# Patient Record
Sex: FEMALE | Race: WHITE | NOT HISPANIC OR LATINO | Employment: PART TIME | URBAN - METROPOLITAN AREA
[De-identification: names, ages, dates, MRNs, and addresses within clinical notes are randomized per-mention and may not be internally consistent; named-entity substitution may affect disease eponyms.]

---

## 2019-06-18 ENCOUNTER — CLINICAL SUPPORT (OUTPATIENT)
Dept: BARIATRICS | Facility: CLINIC | Age: 44
End: 2019-06-18
Payer: COMMERCIAL

## 2019-06-18 ENCOUNTER — OFFICE VISIT (OUTPATIENT)
Dept: BARIATRICS | Facility: CLINIC | Age: 44
End: 2019-06-18
Payer: COMMERCIAL

## 2019-06-18 VITALS
WEIGHT: 247.88 LBS | DIASTOLIC BLOOD PRESSURE: 87 MMHG | RESPIRATION RATE: 16 BRPM | SYSTOLIC BLOOD PRESSURE: 147 MMHG | HEART RATE: 87 BPM | BODY MASS INDEX: 43.92 KG/M2 | HEIGHT: 63 IN

## 2019-06-18 VITALS — BODY MASS INDEX: 43.86 KG/M2 | WEIGHT: 247.56 LBS | HEIGHT: 63 IN

## 2019-06-18 DIAGNOSIS — F41.9 ANXIETY: ICD-10-CM

## 2019-06-18 DIAGNOSIS — E11.9 TYPE 2 DIABETES MELLITUS WITHOUT COMPLICATION, WITHOUT LONG-TERM CURRENT USE OF INSULIN: ICD-10-CM

## 2019-06-18 DIAGNOSIS — I10 ESSENTIAL HYPERTENSION: ICD-10-CM

## 2019-06-18 DIAGNOSIS — F32.A DEPRESSION, UNSPECIFIED DEPRESSION TYPE: ICD-10-CM

## 2019-06-18 DIAGNOSIS — J44.89 COPD WITH CHRONIC BRONCHITIS: ICD-10-CM

## 2019-06-18 DIAGNOSIS — E66.01 MORBID OBESITY WITH BMI OF 40.0-44.9, ADULT: Primary | ICD-10-CM

## 2019-06-18 DIAGNOSIS — M47.812 ARTHRITIS OF NECK: ICD-10-CM

## 2019-06-18 DIAGNOSIS — J42 CHRONIC BRONCHITIS, UNSPECIFIED CHRONIC BRONCHITIS TYPE: ICD-10-CM

## 2019-06-18 PROCEDURE — 99999 PR PBB SHADOW E&M-EST. PATIENT-LVL III: CPT | Mod: PBBFAC,,, | Performed by: PHYSICIAN ASSISTANT

## 2019-06-18 PROCEDURE — 99999 PR PBB SHADOW E&M-EST. PATIENT-LVL III: ICD-10-PCS | Mod: PBBFAC,,, | Performed by: PHYSICIAN ASSISTANT

## 2019-06-18 PROCEDURE — 3079F PR MOST RECENT DIASTOLIC BLOOD PRESSURE 80-89 MM HG: ICD-10-PCS | Mod: CPTII,S$GLB,, | Performed by: PHYSICIAN ASSISTANT

## 2019-06-18 PROCEDURE — 99999 PR PBB SHADOW E&M-NEW PATIENT-LVL II: CPT | Mod: PBBFAC,,, | Performed by: DIETITIAN, REGISTERED

## 2019-06-18 PROCEDURE — 99999 PR PBB SHADOW E&M-NEW PATIENT-LVL II: ICD-10-PCS | Mod: PBBFAC,,, | Performed by: DIETITIAN, REGISTERED

## 2019-06-18 PROCEDURE — 3077F PR MOST RECENT SYSTOLIC BLOOD PRESSURE >= 140 MM HG: ICD-10-PCS | Mod: CPTII,S$GLB,, | Performed by: PHYSICIAN ASSISTANT

## 2019-06-18 PROCEDURE — 3008F PR BODY MASS INDEX (BMI) DOCUMENTED: ICD-10-PCS | Mod: CPTII,S$GLB,, | Performed by: PHYSICIAN ASSISTANT

## 2019-06-18 PROCEDURE — 99499 UNLISTED E&M SERVICE: CPT | Mod: S$GLB,,, | Performed by: DIETITIAN, REGISTERED

## 2019-06-18 PROCEDURE — 3079F DIAST BP 80-89 MM HG: CPT | Mod: CPTII,S$GLB,, | Performed by: PHYSICIAN ASSISTANT

## 2019-06-18 PROCEDURE — 3008F BODY MASS INDEX DOCD: CPT | Mod: CPTII,S$GLB,, | Performed by: PHYSICIAN ASSISTANT

## 2019-06-18 PROCEDURE — 99499 NO LOS: ICD-10-PCS | Mod: S$GLB,,, | Performed by: DIETITIAN, REGISTERED

## 2019-06-18 PROCEDURE — 99205 OFFICE O/P NEW HI 60 MIN: CPT | Mod: S$GLB,,, | Performed by: PHYSICIAN ASSISTANT

## 2019-06-18 PROCEDURE — 3077F SYST BP >= 140 MM HG: CPT | Mod: CPTII,S$GLB,, | Performed by: PHYSICIAN ASSISTANT

## 2019-06-18 PROCEDURE — 99205 PR OFFICE/OUTPT VISIT, NEW, LEVL V, 60-74 MIN: ICD-10-PCS | Mod: S$GLB,,, | Performed by: PHYSICIAN ASSISTANT

## 2019-06-18 RX ORDER — FENOPROFEN CALCIUM 600 MG/1
1 TABLET, FILM COATED ORAL 3 TIMES DAILY PRN
COMMUNITY
End: 2019-10-30

## 2019-06-18 RX ORDER — TIZANIDINE 4 MG/1
4 TABLET ORAL EVERY 6 HOURS PRN
COMMUNITY
End: 2019-10-30

## 2019-06-18 RX ORDER — METFORMIN HYDROCHLORIDE 1000 MG/1
1000 TABLET ORAL 2 TIMES DAILY WITH MEALS
COMMUNITY
End: 2022-04-29

## 2019-06-18 RX ORDER — VENLAFAXINE HYDROCHLORIDE 150 MG/1
150 CAPSULE, EXTENDED RELEASE ORAL DAILY
COMMUNITY
Start: 2019-04-03 | End: 2023-02-15

## 2019-06-18 RX ORDER — GABAPENTIN 300 MG/1
300 CAPSULE ORAL 3 TIMES DAILY
COMMUNITY
End: 2023-02-15 | Stop reason: CLARIF

## 2019-06-18 RX ORDER — BUDESONIDE AND FORMOTEROL FUMARATE DIHYDRATE 160; 4.5 UG/1; UG/1
2 AEROSOL RESPIRATORY (INHALATION) DAILY PRN
COMMUNITY
End: 2019-10-30

## 2019-06-18 RX ORDER — DAPAGLIFLOZIN 5 MG/1
5 TABLET, FILM COATED ORAL DAILY
COMMUNITY
End: 2019-08-22

## 2019-06-18 RX ORDER — LAMOTRIGINE 25 MG/1
25 TABLET ORAL 2 TIMES DAILY
COMMUNITY
Start: 2019-06-17 | End: 2023-02-15 | Stop reason: CLARIF

## 2019-06-18 RX ORDER — FLUCONAZOLE 150 MG/1
TABLET ORAL
Refills: 0 | COMMUNITY
Start: 2019-04-05 | End: 2019-08-22 | Stop reason: ALTCHOICE

## 2019-06-18 RX ORDER — LANCETS 30 GAUGE
EACH MISCELLANEOUS
Refills: 0 | COMMUNITY
Start: 2019-06-06 | End: 2019-06-18 | Stop reason: ALTCHOICE

## 2019-06-18 RX ORDER — SULFAMETHOXAZOLE AND TRIMETHOPRIM 800; 160 MG/1; MG/1
TABLET ORAL
Refills: 0 | COMMUNITY
Start: 2019-04-03 | End: 2019-06-18 | Stop reason: ALTCHOICE

## 2019-06-18 RX ORDER — LISINOPRIL AND HYDROCHLOROTHIAZIDE 12.5; 2 MG/1; MG/1
1 TABLET ORAL DAILY
COMMUNITY
End: 2019-10-30

## 2019-06-18 RX ORDER — PIOGLITAZONEHYDROCHLORIDE 15 MG/1
15 TABLET ORAL DAILY
COMMUNITY
End: 2019-08-22

## 2019-06-18 RX ORDER — BUTALBITAL, ASPIRIN, AND CAFFEINE 325; 50; 40 MG/1; MG/1; MG/1
2 CAPSULE ORAL EVERY 4 HOURS PRN
COMMUNITY
End: 2019-10-30

## 2019-06-18 NOTE — PROGRESS NOTES
BARIATRIC NEW PATIENT EVALUATION    CHIEF COMPLAINT:   Morbid Obesity Body mass index is 43.91 kg/m². and inability to lose weight.    HISTORY OF PRESENT ILLNESS:  Leora Steward  is a 43 y.o. female presenting for morbid obesity Body mass index is 43.91 kg/m². and inability to lose weight. PT states that she started to gain weight at the age of 14 and then it escalated. The patient has tried weight watchers, [facility], nutri system, keto, low carb diet, hydroxycut, adipex, katharine, zantrex, lipozene, pure garcinia . 270 highest weight, lowest adult weight     UTI once a year per patient.     NO inpatient or outpatient psychiatric treatment. Pt states that she did have suicidal ideations last year with no plan. Does not currently feel suicidal or homicidal, has no intentions to harm herself or others. States that medications have been well controlled.     Pt states that she has intermittent heartburn but is not currently on any medication for heartburn. Based on foods.    PAST MEDICAL HISTORY:  Past Medical History:   Diagnosis Date    Anxiety     Arthritis     COPD (chronic obstructive pulmonary disease)     Depression     Diabetes mellitus     Hypertension     Neuromuscular disorder          PAST SURGICAL HISTORY:  Past Surgical History:   Procedure Laterality Date    CHOLECYSTECTOMY      HYSTERECTOMY      full    TUBAL LIGATION         FAMILY HISTORY:  Family History   Problem Relation Age of Onset    No Known Problems Mother     Diabetes Father     Hypertension Father     Diabetes Sister     Diabetes Brother     No Known Problems Daughter     No Known Problems Son     Thyroid disease Sister     No Known Problems Sister     No Known Problems Sister        SOCIAL HISTORY:  Social History     Socioeconomic History    Marital status:      Spouse name: Not on file    Number of children: Not on file    Years of education: Not on file    Highest education level: Not on file   Occupational  History    Not on file   Social Needs    Financial resource strain: Not on file    Food insecurity:     Worry: Not on file     Inability: Not on file    Transportation needs:     Medical: Not on file     Non-medical: Not on file   Tobacco Use    Smoking status: Former Smoker     Last attempt to quit: 2013     Years since quittin.4    Smokeless tobacco: Never Used   Substance and Sexual Activity    Alcohol use: Not Currently     Frequency: Never    Drug use: Never    Sexual activity: Yes     Partners: Male     Birth control/protection: See Surgical Hx   Lifestyle    Physical activity:     Days per week: Not on file     Minutes per session: Not on file    Stress: Not on file   Relationships    Social connections:     Talks on phone: Not on file     Gets together: Not on file     Attends Holiness service: Not on file     Active member of club or organization: Not on file     Attends meetings of clubs or organizations: Not on file     Relationship status: Not on file   Other Topics Concern    Not on file   Social History Narrative    Not on file       MEDICATIONS:  Current Outpatient Medications   Medication Sig Dispense Refill    butalbital-aspirin-caffeine -40 mg (FIORINAL) -40 mg Cap Take 2 capsules by mouth every 4 (four) hours as needed.      dapagliflozin (FARXIGA) 5 mg Tab tablet Take 5 mg by mouth once daily.      fenoprofen (NAPROFEN) 600 mg Tab Take 1 tablet by mouth 3 (three) times daily as needed.      fluconazole (DIFLUCAN) 150 MG Tab   0    gabapentin (NEURONTIN) 300 MG capsule Take 300 mg by mouth 3 (three) times daily.      lamoTRIgine (LAMICTAL) 25 MG tablet Take 25 mg by mouth 2 (two) times daily.       lisinopril-hydrochlorothiazide (PRINZIDE,ZESTORETIC) 20-12.5 mg per tablet Take 1 tablet by mouth once daily.      metFORMIN (GLUCOPHAGE) 1000 MG tablet Take 1,000 mg by mouth 2 (two) times daily with meals.      pioglitazone (ACTOS) 15 MG tablet Take 15 mg by  "mouth once daily.      tiZANidine (ZANAFLEX) 4 MG tablet Take 4 mg by mouth every 6 (six) hours as needed.      venlafaxine (EFFEXOR-XR) 150 MG Cp24 Take 150 mg by mouth once daily.        No current facility-administered medications for this visit.        ALLERGIES:  Review of patient's allergies indicates:  No Known Allergies    ROS:  Review of Systems   Constitutional: Negative for chills and fever.   HENT: Negative for tinnitus.    Eyes: Positive for blurred vision (needs prescription ). Negative for double vision.   Respiratory: Negative for cough and shortness of breath.    Cardiovascular: Positive for leg swelling (BLE feet). Negative for chest pain and palpitations.   Gastrointestinal: Positive for diarrhea (side effect of metformin ) and heartburn. Negative for abdominal pain, constipation, nausea and vomiting.   Genitourinary: Negative for dysuria and hematuria.   Musculoskeletal: Positive for neck pain. Negative for back pain, falls and joint pain.   Skin: Negative for rash.   Neurological: Positive for tingling. Negative for dizziness and headaches.   Psychiatric/Behavioral: Positive for depression. Negative for suicidal ideas. The patient is nervous/anxious. The patient does not have insomnia.        PE:  Vitals:    06/18/19 1143   BP: (!) 147/87   Pulse: 87   Resp: 16   Weight: 112.4 kg (247 lb 14.4 oz)   Height: 5' 3" (1.6 m)       Physical Exam   Constitutional: She is oriented to person, place, and time. She appears well-developed and well-nourished.   HENT:   Head: Normocephalic and atraumatic.   Eyes: Pupils are equal, round, and reactive to light. Conjunctivae and EOM are normal.   Neck: Normal range of motion. Neck supple. No JVD present. No thyromegaly present.   Cardiovascular: Normal rate, regular rhythm, normal heart sounds and intact distal pulses.   No murmur heard.  Pulmonary/Chest: Effort normal and breath sounds normal. She has no wheezes.   Abdominal: Soft. Bowel sounds are normal. " She exhibits no mass. There is no tenderness. No hernia.   Musculoskeletal: Normal range of motion. She exhibits no edema or tenderness.   Neurological: She is alert and oriented to person, place, and time. Coordination normal.   Skin: Skin is warm and dry. Capillary refill takes less than 2 seconds. No rash noted. She is not diaphoretic. No erythema.   Psychiatric: She has a normal mood and affect. Her behavior is normal. Judgment and thought content normal.        DIAGNOSIS:  1. Morbid Obesity with Body mass index is 43.91 kg/m². and inability to lose weight.  2. Co-morbidities: Anxiety/depression, COPD, HTN, DM type 2    PLAN:  The patient is Good candidate for Bariatric Surgery. she is interested in laparoscopic [facility] with Dr. Ulrich. The surgery and post-op care were discussed in detail with the patient. All questions were answered.    she understands that bariatric surgery is a tool to aid in weight loss and that she needs to be committed to the diet and exercise post-operatively for successful weight loss.  Discussed expected weight loss outcomes after surgery which is 50% of the excess weight on her frame.  Goal weight is 135#s.  Discussed with patient that bariatric surgery is not the easy way out and that it will take plenty of dedication on the patient's part to be successful. Also discussed the possibility of weight regain if the patient strays from the diet guidelines or exercise requirements. Patient verbalized understanding and wishes to proceed with the work-up.    ORDERS:  1. Chest X-Ray and EKG  2. Psychological Consult, Bariatric Dietician Consult and PCP Clearance  3. Bariatric Labs: BMP, CBC, Folate Serum, H. pylori, HgA1C, Hepatic Panel/LFT, Iron & TIBC, Lipid Profile, Magnesium, Phosphate, T3, T4, TSH, Free T4, Vitamin B12, and Vitamin B1.  4. Mental health contract     Primary Physician: Camacho Augustine MD  RTC: As scheduled.    60 minute visit, over 50% of time spent counseling  patient face to face on surgical options, risks, benefits, expected diet, recommended exercise regimen, and expected weight loss.

## 2019-06-18 NOTE — PROGRESS NOTES
"NUTRITIONAL CONSULT    Referring Physician: Edmundo Ulrich M.D.  Reason for MNT Referral: Initial assessment for sleeve gastrectomy work-up    PAST MEDICAL HISTORY:   43 y.o. female  Body mass index is 43.86 kg/m².  Weight history includes - since teens was overweight and started gaining about 16-17 years old. Pt reports lost 12lbs with weight watchers then plateau.  Dieting attempts include weight watchers, [facility], [facility], keto, low carb diet, hydroxycut, adipex, katharine, zantrex, lipozene, pure garcinia    PMH: DM2, HTN, depression, COPD, back/neck/knees pain    CLINICAL DATA:  43 y.o.-year-old White female.  Height: 5'3"  Weight: 247 lbs  IBW: 135 lbs  BMI: 43.83  The patient's goal weight (50% EBW): 191 lbs  Personal goal weight: 140 lbs    Goal for Bariatric Surgery: to improve health, to improve quality of life, to lose weight and decrease meds    NUTRITIONAL NEEDS:  1734 x 1.2 activity factor = 2080 - 750 for wt loss = 1330 Calories (using [place] [address] Jeor Equation)   Grams Protein (1.5-1.8 gm/kg IBW)    NUTRITION & HEALTH HISTORY:  Greatest challenge: portion control, emotional eating and always hungry    Current diet recall: 1200 calorie diet on [employer] - finished 1 month  Breakfast: 2 eggs with low calorie muffin  Snack: probiotic protein shake  Lunch: 1/2 cup chicken noodle soup, vegetables  Snack: probiotic shake  Dinner: 3oz hamburger, salad/vegetables    Current Diet:  Protein supplements: 2/day  Vegetables: Likes a variety. Eats daily.  Fruits: Likes a variety. Eats almost daily.  Beverages: water, sugar-free beverages, diet tea and coffee without sugar  Dining out: Reduced lately. Mostly fast food and restaurants.  Cooking at home: Weekly. Mostly baked, grilled and air fryer meat, fish and vegetables.    Exercise:  Current exercise: Fair x 3-5 per week [employer]  Restrictions to exercise: knee, back, neck pain    Vitamins / Minerals / Herbs:   None, upset stomach    "   Labs:   No recent    Food Allergies:   none    Social:  Works regular daytime shifts.  Lives with spouse and 2 adult kids.  Grocery shopping and food prep - patient completes.  Patient believes the household will be supportive after surgery.  Alcohol: None.  Smoking: None.    ASSESSMENT:  · Patient reports attempts at weight loss, only to regain lost weight.  · Patient demonstrated knowledge of healthy eating behaviors and exercise patterns; admits to not eating healthy and not exercising at this point.  · Patient states willingness to change lifestyle and make behavior modifications as evidenced by good exercise, daily food logs, including protein drinks, increased vegetables and healthier cooking at home.    Insurance does not require medically supervised diet prior to consideration for bariatric surgery.    Barriers to Education: none    Stage of change: action and determination    NUTRITION DIAGNOSIS:    Obesity related to Excessive calorie intake and Physical inactivity in the past as evidence by BMI.    BARIATRIC DIET DISCUSSION/PLAN:  Discussed diet after surgery and related to patient's food record.  Reviewed nutrition guidelines for before and after surgery.  Answered all questions.  Continue to review Bariatric Nutrition Guidebook at home and call with any questions.  Work on expanding variety of vegetables.  Work on gradually cutting back on starchy CHO in the diet.  Begin trying various protein supplements to determine preference.  5-6 meals per day.  Return to clinic.  keep food log    RECOMMENDATIONS:  Patient is a good candidate for bariatric surgery.    Patient and Spouse verbalized understanding.    Expect good  compliance after surgery at this time.    Communicated nutrition plan with bariatric team.    SESSION TIME:  60 minutes

## 2019-06-18 NOTE — PATIENT INSTRUCTIONS
Prior to surgery you will need to complete:  - Dietitian consult and follow up appointments as needed  - PCP clearance  - Labs  - Chest X-ray  - EKG  - Psychological evaluation, Please call psychiatry [phone] to schedule   - PFT    In preparation for bariatric surgery, please complete the following:   · Discuss your current medications with your primary care provider, remember your medications will need to be crushed, chewable, or in liquid form for the first 3-6 months after a gastric bypass or sleeve.  For a gastric band, your medications will need to be crushed indefinitely.    · If you take a blood thinner such as: Coumadin (warfarin), Pradaxa (dabigatran), or Plavix (clopidogrel), you will need to speak with your prescribing provider on how or if this medication can be stopped before surgery.   · If you take a medication for depression or anxiety, you will need to begin crushing or opening the capsule 1-3 months prior to surgery.  Remember to discuss this with the psychologist or psychiatrist that you see.   · If you take medication for arthritis on a daily basis that is considered a non-steroidal anti-inflammatory (NSAID), please discuss with your prescribing physician an alternative medication.  After having gastric bypass or gastric sleeve, this group of medications is not appropriate to take due to increased risk of bleeding stomach ulcers.      DEFINITIONS  Appointments: Pre-scheduled meetings or consultations with any physician, advanced practice provider, dietitian, or psychologist, and labs, imaging studies, sleep studies, etc.   Late cancellation: Cancelling an appointment 24-48 hours prior to scheduled time.  No-Show appointment:  is when    You do NOT arrive to your appointment at the time its scheduled.   You call to cancel or cancel via [facility] less than 24 hours in advance of your scheduled appointment   You show up 15 minutes AFTER your scheduled appointment time without any  notification of being late.

## 2019-06-18 NOTE — PATIENT INSTRUCTIONS
Continue to review Bariatric Nutrition Guidebook at home and call with any questions.  Work on expanding variety of vegetables.  Work on gradually cutting back on starchy CHO in the diet.  Begin trying various protein supplements to determine preference.  5-6 meals per day.  Return to clinic.  keep food log.    Meal Ideas for Regular Bariatric Diet  *Recipes and products available at [email or web address]      Breakfast: (15-20g protein)    - Egg white omelet: 2 egg whites or ½ cup Egg Beaters. (Optional proteins: cheese, shrimp, black beans, chicken, sliced turkey) (Optional veggies: tomatoes, salsa, spinach, mushrooms, onions, green peppers, or small slice avocado)     - Egg and sausage: 1 egg or ¼ cup Egg Beaters (any variety), with 1 brock or 2 links of Turkey sausage or Veggie breakfast sausage (Fanzila or [employer])    - Crust-less breakfast quiche: To make a glass pie dish, mix 4oz part skim Ricotta, 1 cup skim milk, and 2 eggs as your base. Add protein: shredded cheese, sliced lean ham or turkey, turkey wing/sausage. Add veggies: tomato, onion, green onion, mushroom, green pepper, spinach, etc.    - Yogurt parfait: Mix 1 - 6oz container Dannon Light N Fit vanilla yogurt, with ¼ cup crushed unsalted nuts    - Cottage cheese and fruit: ½ cup part-skim cottage cheese or ricotta cheese topped with fresh fruit or sugar free preserves     - Adia Moncaad's Vanilla Egg custard* (add 2 Tbsp instant coffee granules to make Cappuccino Custard*)    - Hi-Protein café latte (skim milk, decaf coffee, 1 scoop protein powder). Optional to add Sugar free syrup or extract flavoring.    - Breakfast Lox: spread fat free cream cheese on slices of smoked salmon. Serve over scrambled or egg over easy (sauteed with nonstick cookspray) OR on a cucumber slice    - Eggwhich: Scramble or cook 1 large egg over easy using nonstick cookspray. Place between 2 slices of Kazakh wing and low fat cheese.     Lunch: (20-30g  protein)    - ½ cup Black bean soup (Homemade or Progresso), with ¼ cup shredded low-fat cheese. Top with chopped tomato or fresh salsa.     - Lean deli turkey breast and low-fat sliced cheese, mustard or light bridges to moisten, rolled up together, or wrapped in a César lettuce leaf    - Chicken salad made from dinner leftovers, moisten with low-fat salad dressing or light bridges. Also try leftover salmon, shrimp, tuna or boiled eggs. Serve ½ cup over dark green salad    - Fat-free canned refried beans, topped with ¼ cup shredded low-fat cheese. Top with chopped tomato or fresh salsa.     - Greek salad: Top mixed greens with 1-2oz grilled chicken, tomatoes, red onions, 2-3 kalamata olives, and sprinkle lightly with feta cheese. Spritz with Balsamic vinegar to taste.     - Crust-less lunch quiche: To make a glass pie dish, mix 4oz part skim Ricotta, 1 cup skim milk, and 2 eggs as your base. Add protein: shredded cheese, sliced lean ham or turkey, shrimp, chicken. Add veggies: tomato, onion, green onion, mushroom, green pepper, spinach, artichoke, broccoli, etc.    - Pizza bake: spread a  tobi brandie mushroom with tomato sauce, low-fat shredded mozzarella and turkey pepperoni or [place] wing. Add any veggies. Roast for 10-15 minutes, until cheese melted.     - Cucumber crab bites: Spread ¼ cup crab dip (lump crabmeat + light cream cheese and green onions) over sliced cucumber.     - Chicken with light spinach and artichoke dip*: Puree in : 6oz cooked and drained spinach, 2 cloves garlic, 1 can cannelloni beans, ½ cup chopped green onions, 1 can drained artichoke hearts (not marinated in oil), lemon juice and basil. Mix in 2oz chopped up chicken.    Supper: (20-30g protein)    - Serve grilled fish over dark green salad tossed with low-fat dressing, served with grilled asparagus mccrary     - Rotisserie chicken salad: served with sliced strawberries, walnuts, fat-free feta cheese crumbles and 1 tbsp  Saniya Own Light Raspberry [place] Vinaigrette    - Shrimp cocktail: Dip cold boiled shrimp in homemade low-sugar cocktail sauce (1/2 cup Bertrand One Carb ketchup, 2 tbsp horseradish, 1/4 tsp hot sauce, 1 tsp Worcestershire sauce, 1 tbsp freshly-squeezed lemon juice). Serve with dark green salad, walnuts, and crumbled blue cheese drizzled with olive oil and Balsamic vinegar    - Tuna Melt: Spread tuna salad onto 2 thick slices of tomato. Top with low-fat cheese and broil until cheese is melted. May also be made with chicken salad of shrimp salad. [place] with different types of cheeses.    - Chicken or beef fajitas (no tortilla, rice, beans, chips). Top meat and veggies w/ fresh salsa, fat free sour cream.     - Homemade low-fat Chili using extra lean ground beef or ground turkey. Top with shredded cheese and salsa as desired. May add dollop fat-free sour cream if desired    - Chicken parmesan: Top chicken breast w/ low sugar marinara sauce, mozzarella cheese and bake until chicken reaches 165*.  Serve w/ spaghetti SQUASH or Greenlandic cut green beans    - Dinner Omelet with shrimp or chicken and onion, green peppers and chives.    - No noodle lasagna: Use sliced zucchini or eggplant in place of noodles.  Layer with part skim ricotta cheese and low sugar meat sauce (use very lean ground beef or ground turkey).    - Mexican chicken bake: Bake chunks of chicken breast or thigh with taco seasoning, Pace brand enchilada sauce, green onions and low-fat cheese. Serve with ¼ cup black beans or fat free refried beans topped with chopped tomatoes or salsa.    - Jessica frozen meatballs, simmered in Classico Marinara sauce. Different flavors of salsa or spaghetti sauce create different dishes! Sprinkle with parmesan cheese. Serve with grilled or steamed veggies, or a dark green salad.    - Simmer boneless skinless chicken thigh chunks in Classico Marinara sauce or roasted salsa until tender with chopped onion, bell pepper,  garlic, mushrooms, spinach, etc.     - Hamburger or veggie burger, without the bun, dressed the way you like. Served with grilled or steamed veggies.    - Eggplant parmesan: Bake slices of eggplant at 350 degrees for 15 minutes. Layer tomato sauce, sliced eggplant and low-fat mozzarella cheese in a baking dish and cover with foil. Bake 30-40 more minutes or until bubbly. Uncover and bake at 400 degrees for about 15 more minutes, or until top is slightly crisp.    - Fish tacos: grilled/baked white fish, wrapped in César lettuce leaf, topped with salsa, shredded low-fat cheese, and light coleslaw.    - Chicken christy: Sprinkle chicken w/ 1 tsp of hidden [place] ranch dip mix. Then grill chicken and top with black beans, salsa and 1 tsp fat free sour cream.     - Cauliflower pizza crust: Use cauliflower as crust ([facility] recipe [facility], no flour!). Top w/ low fat cheese, turkey pepperoni and veggies and bake again    - chicken or turkey crust pizza: use ground chicken or turkey instead of cauliflower, spread in [place] and bake at 350 for about 20-30 minutes(may want to add garlic, black pepper, oregano and other herbs to ground meat mixture).  Remove and top w/ low fat cheese, turkey pepperoni and veggies and bake again for another 10 minutes or until cheese is browned.     Snacks: (100-200 calories; >5g protein)    - 1 low-fat cheese stick with 8 cherry tomatoes or 1 serving fresh fruit  - 4 thin slices fat-free turkey breast and 1 slice low-fat cheese  - 4 thin slices fat-free honey ham with wedge of melon  - 6-8 edamame pods (equivalent to about 1/4 cup edamame without pods).   - 1/4 cup unsalted nuts with ½ cup fruit  - 6-oz container Dannon Light n Fit vanilla yogurt, topped with 1oz unsalted nuts         - apple, celery or baby carrots spread with 2 Tbsp PB2  - apple slices with 1 oz slice low-fat cheese  - Apple slices dipped in 2 Tbsp of PB2  - celery, cucumber, bell pepper or baby carrots dipped in ¼ cup  hummus bean spread or light spinach and artichoke dip (*recipe in lunch section)  - celery, cucumber, baby carrots dipped in high protein greek yogurt (Mix 16 oz plain greek yogurt + 1 packet of hidden [place] ranch dip mix)  - Cisco Davis Beef Steak - 14g protein! (similar to beef jerky)  - 2 wedges Laughing Cow - Light Herb & Garlic Cheese with sliced cucumber or green bell pepper  - 1/2 cup low-fat cottage cheese with ¼ cup fruit or ¼ cup salsa  - RTD Protein drinks: Atkins, Low Carb Slim Fast, EAS light, Muscle Milk Light, etc.  - Homemade Protein drinks: [employer]NC Soy95, Isopure, Nectar, UNJURY, Whey Gourmet, etc. Mix 1 scoop powder with 8oz skim/1% milk or light soymilk.  - Protein bars: Atkins, EAS, Pure Protein, Think Thin, Detour, etc. Must have 0-4 grams sugar - Read the label.    Takeout Options: No more than twice/week  Deli - Salads (no pasta or rice), meats, cheeses. Roasted chicken. Lox (salmon)    Mexican - Platters which don't include tortillas, chips, or rice. Go easy on the beans. Example: Fajitas without the tortillas. Ask the  not to bring chips to the table if they are too tempting.    Greek - Meat or fish and vegetable, but no bread or rice. Including hummus, baba ganoush, etc, is OK. Most sit-down Greek restaurants can provide you with cucumber slices for dipping instead of isaac bread.    Fast Food (Avoid as much as possible) - Salads (no croutons and limit salad dressing to 2 tbsp), grilled chicken sandwich without the bun and ask for no bridges. Sashas low fat chili or Taco Bell pintos and cheese.    BBQ - The meats are fine if you ask for sauces on the side, but most of the traditional side dishes are loaded with carbs. Mele slaw, baked beans and BBQ sauce are typically made with sugar.    Chinese - Nothing deep-fried, no rice or noodles. Many Chinese sauces have starch and sugar in them, so you'll have to use your judgement. If you find that these sauces trigger cravings, or cause Dumping,  you can ask for the sauce to be made without sugar or just use soy sauce.    Bariatric Diet Grocery List      High in Protein:   ? Canned tuna or chicken (packed in water)  ? Lean ground turkey breast or ground round  ? Turkey or chicken (no skin)  ? Lean pork or beef   ? Scrambled, poached, or boiled eggs  ? Baked or broiled fish and seafood (not fried!)  ? Beans, edamame and lentils  ? Low fat deli meats: turkey, chicken, ham, roast beef  ? 1% or Skim Milk, Lactaid, or Soymilk  ? Low-fat cheese, cottage cheese, mozzarella string cheese, ricotta  ? Light yogurt, FF/SF frozen yogurt, custard, SF pudding  ? Protein drinks and protein bars with 0-4 grams sugar     Fruits, Vegetables and Snacks   - Green beans, broccoli, cauliflower, spinach, asparagus, carrots, lima beans, yellow squash, zucchini, etc.  - Apple, pineapple, peach, grapes, banana, watermelon, oranges, etc.  - Fruit canned in its own juice or in water (not in syrup)  - Raw veggies  - Lettuce: dark greens like spinach and César  - Unsalted Nuts  - Cisco Links beef jerky     Fluids:   Skim/1% milk, Lactaid, Soymilk  Sugar-free beverages  (decaf and non-carbonated)  Decaf coffee & decaf tea   Water           1200- 1500 calorie Sample meal plan  80-120g protein per day.   Protein drinks and bars: 0-4 grams sugar  Drink lots of water throughout the day and exercise!  MENU # 1  Breakfast: 2 eggs, 1 turkey sausage brock, 1 apple  Snack: Atkins bar  Lunch: 2 roll-ups (sliced turkey, low-fat sliced cheese, mustard), 12 baby carrots dipped in 1 Tbsp natural peanut butter  Mid-Day Snack: ¼ cup unsalted almonds, ½ cup fruit  Dinner: 1 chicken thigh simmered in tomato sauce + 2 Tbsp mozzarella cheese, ½ cup black beans, 1/2 cup steamed carrots  Evening Snack: 1/2 cup grapes with 4 cubes low-fat cheddar cheese   ___________________________________________________  [facility]  Breakfast: 200 calorie protein drink  Mid-morning snack : ¼ cup unsalted nuts, medium  banana  Lunch: 3oz tuna or chicken salad made with 2 tbsp light bridges, over salad with tomatoes and cucumbers.   Snack: low-fat cheese stick  Dinner: 3oz hamburger brock, slice of low-fat cheese, 1 cup boiled yellow squash and zucchini.   Snack: 6oz light yogurt  _______________________________________________________  Menu #3  Breakfast: 6oz plain Greek yogurt + fruit (½ banana, ½ cup fruit - pineapple, blueberries, strawberries, peach), may add Splenda to fady.  Lunch: Grilled  chicken breast w/ slice low-fat pepper colten cheese, 1/2 cup grilled/baked asparagus and small salad with Salad Spritzer.    Mid-Day snack: 200 calorie protein drink   Dinner: 4oz Grilled fish, ½ cup white beans, ½ cup cooked spinach  Evening Snack: fudgsicle no-sugar-added    Menu # 4  Breakfast: 1 scoop vanilla protein powder + 4oz skim milk + 4oz coffee   Snack: Pure protein bar  Lunch: 2 Lettuce tacos: 3oz seasoned ground turkey wrapped in a César lettuce leaves with 1 Tbsp shredded cheese and dollop low-fat sour cream  Snack: ½ cup cottage cheese, ½ cup pineapple chunks  Dinner: Shrimp omelet: 2 eggs, ½ cup shrimp, green onions, and shredded cheese  ______________________________________________________  Menu #5  Breakfast: 1 cup low-fat cottage cheese, ½ cup peaches (no sugar added)  Snack: 1 apple, 4 cubes cheese  Lunch: 3oz baked pork chop, 1 cup okra and tomato stew  Snack: 1/2 cup black beans + salsa + dollop light sour cream  Dinner: Caprese chicken salad: 3 oz chicken breast, 1oz fresh mozzarella, sliced tomato, 1 Tbsp olive oil, basil  Snack: sugar-free popsicle    Menu #6  Breakfast: ½ cup part-skim ricotta cheese, 2 Tbsp sugar-free strawberry preserves, sprinkle of slivered almonds  Snack: 1 orange  Lunch: 3 oz canned chicken, 1oz shredded cheddar cheese, ½  cup black beans, 2 Tbsp salsa  Snack: 200 calorie Protein drink  Dinner: 4 oz grilled salmon steak, over mixed green salad with 2 tbsp light dressing

## 2019-06-19 ENCOUNTER — PATIENT MESSAGE (OUTPATIENT)
Dept: BARIATRICS | Facility: CLINIC | Age: 44
End: 2019-06-19

## 2019-06-20 ENCOUNTER — PATIENT MESSAGE (OUTPATIENT)
Dept: BARIATRICS | Facility: CLINIC | Age: 44
End: 2019-06-20

## 2019-06-20 DIAGNOSIS — R79.89 LOW VITAMIN D LEVEL: Primary | ICD-10-CM

## 2019-06-20 RX ORDER — ERGOCALCIFEROL 1.25 MG/1
50000 CAPSULE ORAL
Qty: 12 CAPSULE | Refills: 0 | Status: SHIPPED | OUTPATIENT
Start: 2019-06-20 | End: 2019-09-06

## 2019-06-25 ENCOUNTER — PATIENT MESSAGE (OUTPATIENT)
Dept: BARIATRICS | Facility: CLINIC | Age: 44
End: 2019-06-25

## 2019-07-08 ENCOUNTER — PATIENT MESSAGE (OUTPATIENT)
Dept: BARIATRICS | Facility: CLINIC | Age: 44
End: 2019-07-08

## 2019-08-01 ENCOUNTER — OFFICE VISIT (OUTPATIENT)
Dept: PSYCHIATRY | Facility: CLINIC | Age: 44
End: 2019-08-01
Payer: COMMERCIAL

## 2019-08-01 ENCOUNTER — TELEPHONE (OUTPATIENT)
Dept: BARIATRICS | Facility: CLINIC | Age: 44
End: 2019-08-01

## 2019-08-01 DIAGNOSIS — Z01.818 PREOPERATIVE EVALUATION TO RULE OUT SURGICAL CONTRAINDICATION: Primary | ICD-10-CM

## 2019-08-01 DIAGNOSIS — I10 ESSENTIAL HYPERTENSION: ICD-10-CM

## 2019-08-01 DIAGNOSIS — E66.01 MORBID OBESITY DUE TO EXCESS CALORIES: ICD-10-CM

## 2019-08-01 DIAGNOSIS — F41.9 ANXIETY: ICD-10-CM

## 2019-08-01 PROCEDURE — 96130 PR PSYCHOLOGIC TEST EVAL SVCS, 1ST HR: ICD-10-PCS | Mod: S$GLB,,, | Performed by: PSYCHOLOGIST

## 2019-08-01 PROCEDURE — 96138 PR PSYCH/NEUROPSYCH TEST ADMIN/SCORING, BY TECH, 2+ TESTS, 1ST 30 MIN: ICD-10-PCS | Mod: S$GLB,,, | Performed by: PSYCHOLOGIST

## 2019-08-01 PROCEDURE — 96130 PSYCL TST EVAL PHYS/QHP 1ST: CPT | Mod: S$GLB,,, | Performed by: PSYCHOLOGIST

## 2019-08-01 PROCEDURE — 96139 PR PSYCH/NEUROPSYCH TEST ADMIN/SCORING, BY TECH, 2+ TESTS, EA ADDTL 30 MIN: ICD-10-PCS | Mod: S$GLB,,, | Performed by: PSYCHOLOGIST

## 2019-08-01 PROCEDURE — 96138 PSYCL/NRPSYC TECH 1ST: CPT | Mod: S$GLB,,, | Performed by: PSYCHOLOGIST

## 2019-08-01 PROCEDURE — 96139 PSYCL/NRPSYC TST TECH EA: CPT | Mod: S$GLB,,, | Performed by: PSYCHOLOGIST

## 2019-08-01 PROCEDURE — 90791 PR PSYCHIATRIC DIAGNOSTIC EVALUATION: ICD-10-PCS | Mod: S$GLB,,, | Performed by: PSYCHOLOGIST

## 2019-08-01 PROCEDURE — 96131 PR PSYCHOLOGIC TEST EVAL SVCS, EA ADDTL HR: ICD-10-PCS | Mod: S$GLB,,, | Performed by: PSYCHOLOGIST

## 2019-08-01 PROCEDURE — 96131 PSYCL TST EVAL PHYS/QHP EA: CPT | Mod: S$GLB,,, | Performed by: PSYCHOLOGIST

## 2019-08-01 PROCEDURE — 90791 PSYCH DIAGNOSTIC EVALUATION: CPT | Mod: S$GLB,,, | Performed by: PSYCHOLOGIST

## 2019-08-01 NOTE — TELEPHONE ENCOUNTER
Patient and  walked in clinic.     Patient was just cleared by psych five minute prior to walking down to our waiting room. Patient wanted to know when she would be scheduled for her surgery. Informed her that we do not get the clearance instantly and once we have it, a nurse will review her chart to see if she is ready for surgery. Instructed patient to wait until the end of next week before following up since we have a lot of surgeries to schedule and we are working as efficient as we can. Patient and  expressed understanding. Asked about insurance, directed to FC.

## 2019-08-01 NOTE — PSYCH TESTING
[facility] [address] [place]  [address]  [phone]    REPORT OF PSYCHOLOGICAL TESTING    NAME: Mary Steward  OC #: [id number]  : 1975    REFERRED BY: Edmundo Ulrich M.D.      EVALUATED BY:  Erendira Mead, Ph.D., Clinical Psychologist  SUE Santoyo, Psychometrician    DATES OF EVALUATION: 2019, 2019    EVALUATION PROCEDURES AND TIMES:  Conducted by Psychologist (2 hours):  Integration of patient data, interpretation of standardized test results and clinical data, clinical decision-making, treatment planning and report, and interactive feedback to the patient  CPT Codes:  99834 - 1 hour; 95247 - 1 hour  Conducted by Technician (2 hours, 15 minutes):  Psychological test administration and scoring by technician, two or more tests, any method:  [place] Multiphasic Personality Inventory - 2 - Restructured Form (MMPI-2-RF); Heart Depression Inventory - II (BDI-II); [facility] Diagnostic (MBMD)  CPT Codes:  37274 - 30 minutes; 65676 - 30 minutes, 63613 - 30 minutes, 98667 - 30 minutes (3 additional units)    EVALUATION FINDINGS:  Before this evaluation was initiated, the purposes and process of the assessment and the limits of confidentiality were discussed with the patient who expressed understanding of these issues and orally consented to proceed with the evaluation.    Ms. Steward has struggled with weight since adolescence. She reports that she had unhealthy eating habits for many years, in part due to a lack of nutritional knowledge. For many years, she would skip breakfast, drinking coffee/Coke and smoking cigarettes until lunch when she would eat fast food, and then she would eat a very large dinner that usually consisted of fried food, fattening food, and starches. She does not believe that she has ever been an emotional eater; she reports that she ate the same way daily no matter what was going on in her life. She tried many weight  "loss methods on her own over the years including Nutrisystem, Weight Watchers, diet pills, and [facility] with little success; she would lose "a few pounds" initially but then stop the diets after about a month of being at the same weight. She reports that for the past year, her whole family has overhauled their eating and she has been much healthier - she has cut out Coke, fried foods, and carbs, and is eating more vegetables and proteins. She has met with Ms. Carmen Hinojosa RD, bariatric dietician, who cleared her for surgery based on these healthier diet choices. Her motivation for seeking surgery now is to treat and control her Diabetes. Her postsurgical goals include: improved health, better stamina and comfort when watching her son play baseball or going on trips, and improved energy.    Ms. Steward report the onset of anxiety symptoms 2-3 years ago and does take psychotropic medication as prescribed her PCP, with good reported effect. She reports no current clinically impairing psychiatric symptoms. She denies a history of eating disorder.    At her initial consultation with Ms. Angelita Iraheta on 06/18/2019, her BMI was 44. Her medical comorbidities include: Diabetes and Hypertension. She has met with a bariatric dietician and reports that she has made changes to her lifestyle. She was well informed regarding the details of the procedure, as well as the risks of the procedures and post-operative eating restrictions. She appears motivated for change at this time. She was fully cooperative and engaged in the assessment process.    Ms. Steward produced a valid and interpretable MMPI-2RF protocol. Her test results should be considered a reasonably accurate reflection of her current psychiatric functioning. Her scores are all within the normal range, indicating no current psychiatric symptoms or acute emotional distress. Testing reveals that she is introverted and tends to avoid social situations.    [facility] " indicated that Ms. Steward is not reporting any significant psychiatric symptoms at this time. She is likely to follow medical advice and adhere to a treatment program. She identifies her spiritual sanya, social support, and optimistic outlook as her main coping strengths. Testing indicates that psychological factors are unlikely to contribute to surgical complications, and that she is quite capable of handling the psychological and physical discomfort that accompanies surgery. Test results reveal that, compared to other bariatric surgery patients, there is a good chance that she will engage in healthy lifestyle changes to support positive surgery results, and a high chance of improved quality of life after surgery.     DIAGNOSTIC IMPRESSIONS:  Z68.41   Body Mass Index of 44  Z01.818 Pre-operative Exam  E66.01   Morbid Obesity    SUMMARY AND RECOMMENDATIONS:  Ms. Steward has a long history of weight problems and is pursuing bariatric surgery at this time in an effort to improve her health and quality of life. Results of personality testing should be considered valid, and they indicate that she is experiencing no acute psychiatric symptoms or declines in functioning at this time. Test results do not reveal any evidence that psychological difficulties would play a role in her recovery from surgery. No overt psychological contraindications were revealed by psychological testing.

## 2019-08-01 NOTE — PROGRESS NOTES
"Psychiatry Initial Visit (PhD/LCSW)   Diagnostic Interview - CPT 37906     Date: 08/01/2019    Site: [facility]     Referral source: Edmundo Ulrich M.D.     Clinical status of patient: Outpatient     Leora Steward (Missy), a 43 y.o. female, presented for initial evaluation visit. Before this evaluation was initiated, the purposes and process of the assessment and the limits of confidentiality were discussed with the patient who expressed understanding of these issues and orally consented to proceed with the evaluation.     Chief complaint/reason for encounter: Routine psychological evaluation prior to bariatric surgery.     Type of surgery sought: ERMELINDA    History of present illness: Ms. Steward is a 43-year-old  female who is pursuing bariatric surgery to improve her health and quality of life. She was diagnosed with Anxiety Disorder NOS several years ago but reports no prior history of significant psychological difficulties. She does take psychotropic medication, with good effect. She has never been hospitalized for psychiatric reasons and denied any history of suicidality. She has begun making positive lifestyle changes in anticipation for surgery, with good benefit. She has a Body Mass Index of 44 as documented by the referring provider.    Ms. Steward has struggled with weight since adolescence. She reports that she had unhealthy eating habits for many years, in part due to a lack of nutritional knowledge. For many years, she would skip breakfast, drinking coffee/Coke and smoking cigarettes until lunch when she would eat fast food, and then she would eat a very large dinner that usually consisted of fried food, fattening food, and starches. She does not believe that she has ever been an emotional eater; she reports that she ate the same way daily no matter what was going on in her life. She tried many weight loss methods on her own over the years including Nutrisystem, Weight Watchers, diet " "pills, and [facility] with little success; she would lose "a few pounds" initially but then stop the diets after about a month of being at the same weight. She reports that for the past year, her whole family has overhauled their eating and she has been much healthier - she has cut out Coke, fried foods, and carbs, and is eating more vegetables and proteins. She has met with Ms. Carmen Hinojosa RD, bariatric dietician, who cleared her for surgery based on these healthier diet choices. Her motivation for seeking surgery now is to treat and control her Diabetes. Her postsurgical goals include: improved health, better stamina and comfort when watching her son play baseball or going on trips, and improved energy.      Co-morbidities: DM, HTN     Knowledge of surgery information:  - Basics of procedure: Y  - Risks: Y  - Basics of diet: Y    Pain: 5/10 - back pain that she believes is related to her weight    Psychiatric Symptoms:   Depression - denied significant symptoms of depression.   Yamileth/Hypomania - denied significant symptoms of yamileth/hypomania.  Anxiety - denied significant symptoms of anxiety.   Thoughts - denied delusions, hallucinations.  Suicidal thoughts/behaviors - denied; of note, according to note by Angelita Iraheta on 6/18, Ms. Steward had reported suicidal ideation last year (with no plan or intent) but she denied this in today's meeting, stating that the only time she had suicidal thoughts in her life was at the age of 20 after her first  left her.  Substance abuse - denied abuse or dependence.   Sleep - 8-10 hours/night.  Self-injury - denied.    Current psychiatric treatment:  Medications: Effexor, Lamictal - estimates that she has been on these meds for 2 years.    Psychotherapy: None.    Treating clinicians: Dr. Camacho Augustine - PCP. She has never seen a psychiatrist.     Health behaviors: Reported adherence to pharmacologic regimen.      Psychiatric history:   Previous diagnosis: Ms. Steward " "was vague on details regarding her starting psychiatric medications, and does not appear to be a strong historian. She reports that she had never experienced psychiatric problems in the past, but "a few years ago" without any known trigger or prompt, she started experiencing uncomfortable sensations of "excitement in my chest" that her doctor diagnosed as anxiety. She does not report any other symptoms of anxiety disorder (she reports some worry but not to an overwhelming degree, no sleep problems, no concentration problems) with the exception of one panic attack she had years ago at work, but she does reports that she was "aggravated" more than usual and that her feelings were "hurt more easily". Queried her about possible manic symptoms, but she denied expansive/euphoric mood, grandiosity, sleep problems, risky/impulsive behavior. When asked why she was put on Lamictal, pt states that the "anxiety in my chest was still there", and that the addition of that medication helped her. She denies any history of depression. She denies any history of psychosis.    Previous hospitalizations: Denies.     History of outpatient treatment: PCP manages psychotropic medications.    Previous suicide attempt: Denies. Reports she had some passive SI after her  left her for another woman when she was 20, but no attempts.      Trauma history:  Denies.      History of eating disorders:  History of bulimia: Denies.    History of binge-eating episodes: Denies.      Family history of psychiatric illness: None reported.     Social history (marriage, employment, etc.): Ms. Steward was born and raised in [place], [place] by her biological parents and is the oldest of 4 children. Her parents  when she was 10 years old and her father was awarded full custody because her mother "didn't show up to the hearing" and was "busy with her own life". She denies a history of abuse in childhood, but states that she had to "grow up fast" as the " "oldest child to help her father with the younger kids. Ms. Steward left school in 9th grade and got  at the age of 14. 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 She has been  to her  for 20 years; her prior marriage lasted 6 years and ended in divorce. She has 2 children, ages 21 and 19, both of whom attend college. She lives with her  and daughter in [place]. She identifies as Restoration.     Current psychosocial stressors: "Health, weight".    Report of coping skills: Talk to , spend time with children, paint/draw, fix and repair old furniture.    Support system:  and children are all very supportive of her having surgery and they are also working on healthier eating. Her father and siblings are supportive too, and she has a few friends who have had bariatric surgery and have offered their advice and support.     Substance use:   Alcohol: Denied current use; denied history of abuse or dependency.   Drugs: Denied current use; denied history of abuse or dependency.  Tobacco: None. Quit smoking 6 years ago.  Caffeine: Cut out Cokes, still drinks 1 coffee per day.    Current medications and drug reactions (include OTC, herbal): see medication list     Strengths and liabilities: Strength: Patient accepts guidance/feedback, Strength: Patient is motivated for change., Strength: Patient has positive support network., Strength: Patient has reasonable judgment., Strength: Patient is stable., Liability: Patient poor historian.    Current Evaluation:    Mental Status Exam:   General Appearance:  age appropriate, well dressed, neatly groomed, overweight    Speech:  normal tone, normal rate, normal pitch, normal volume    Level of Cooperation:  cooperative    Thought Processes:  normal and logical    Mood:  euthymic    Thought Content:  " "normal, no suicidality, no homicidality, delusions, or paranoia    Affect:  congruent and appropriate    Orientation:  oriented x3    Memory:  recent memory intact; immediate word recall 3/3, delayed word recall 2/3  remote memory intact; able to recall remote personal events   Attention Span & Concentration:  spelled "WORLD" forwards and backwards without errors   Fund of General Knowledge:  appropriate for education    Abstract Reasoning:  interpretation of proverbs was abstract; interpretation of similarities was concrete   Judgment & Insight:  fair    Language  intact        Diagnostic Impression - Plan:      Diagnostic Impression:  Z01.818 Pre-operative examination   E66.01   Morbid obesity  I10          Hypertension  E11.8     Diabetes Type II  Z68.41    Body Mass Index of 44    Summary/Conclusion:   There are no overt psychological contraindications for proceeding with bariatric surgery. Ms. Steward does have a diagnosis of Anxiety Disorder NOS and is currently taking medications prescribed by her PCP. Although she did not give much information about the onset or symptoms of her anxiety problem, she does report that her medications have been helpful and she has been compliant with them. She reports no current psychiatric problems or major adjustment issues, and her psychological testing indicates no current symptoms or functional problems. Ms. Steward has reasonable expectations for surgery, good social support, and has already begun making appropriate lifestyle changes in anticipation for surgery. She has verbalized appropriate awareness and commitment to the necessary behavioral changes associated with bariatric surgery and appears willing to comply with long-term lifestyle changes.     Recommendations:  -This patient is psychologically cleared to proceed with bariatric surgery.  -There are no recommendations for psychological treatment at this time. She was encouraged to continue her current medication " regimen. The patient is aware of resources available should her needs change in the future.    Please see Psychological Testing report available in Notes tab of Chart Review in Epic for results of psychological testing.

## 2019-08-08 ENCOUNTER — TELEPHONE (OUTPATIENT)
Dept: BARIATRICS | Facility: CLINIC | Age: 44
End: 2019-08-08

## 2019-08-08 ENCOUNTER — PATIENT MESSAGE (OUTPATIENT)
Dept: BARIATRICS | Facility: CLINIC | Age: 44
End: 2019-08-08

## 2019-08-08 DIAGNOSIS — Z01.818 PREOP TESTING: Primary | ICD-10-CM

## 2019-08-08 DIAGNOSIS — I10 ESSENTIAL HYPERTENSION: ICD-10-CM

## 2019-08-08 DIAGNOSIS — E11.9 TYPE 2 DIABETES MELLITUS WITHOUT COMPLICATION, WITHOUT LONG-TERM CURRENT USE OF INSULIN: ICD-10-CM

## 2019-08-08 DIAGNOSIS — E66.01 MORBID OBESITY WITH BMI OF 40.0-44.9, ADULT: ICD-10-CM

## 2019-08-08 NOTE — TELEPHONE ENCOUNTER
Reviewed chart.  HA1C 9.3 needs repeat.  Scheduled repeat labs for 8/12/19.  If <8.5 can schedule surgery.  Will also need mental health contract signed.

## 2019-08-08 NOTE — TELEPHONE ENCOUNTER
----- Message from Keisha Stoll sent at 8/8/2019  8:58 AM CDT -----  Contact: pt  Needs Advice    Reason for call: The pt is calling to speak with the nurse regarding an update on a possible surgery date.         Communication Preference: [phone]     Additional Information:

## 2019-08-09 ENCOUNTER — DOCUMENTATION ONLY (OUTPATIENT)
Dept: BARIATRICS | Facility: CLINIC | Age: 44
End: 2019-08-09

## 2019-08-09 NOTE — PROGRESS NOTES
Someone has submitted a Bariatric Surgery Online Course Form Submission on this page.  Date: 06- 12:51:53  Patient's Name: Leora Steward  YOB: 1975 Email: [email or web address]  Phone: [phone]   Patient Address: [address] [place] [address] [place] [address]  Preferred Surgical Location: [facility]   I certify that I am 18 years of age or older: Yes   Confirmation Code: [id number]  Verification of Bariatric Seminar: yes  Insurance Information  Insurance or Self Pay? Insurance - Fill out fields below  Insurance Company Name: Blue Cross Blue Shield   Type of Coverage/Coverage Plan (i.e. PPO, HMO): Ppo   Group Name: [id number]   Subscriber Name: Vinicius Steward   Member Name (patient's name): Leora Steward   Member ID/Policy #:[id number]   Plan Effective Date: 01/01/2015  Card Issuance date:

## 2019-08-14 ENCOUNTER — PATIENT MESSAGE (OUTPATIENT)
Dept: BARIATRICS | Facility: CLINIC | Age: 44
End: 2019-08-14

## 2019-08-19 ENCOUNTER — DOCUMENTATION ONLY (OUTPATIENT)
Dept: BARIATRICS | Facility: CLINIC | Age: 44
End: 2019-08-19

## 2019-08-21 ENCOUNTER — PATIENT MESSAGE (OUTPATIENT)
Dept: BARIATRICS | Facility: CLINIC | Age: 44
End: 2019-08-21

## 2019-08-21 NOTE — TELEPHONE ENCOUNTER
Spoke with patient.  Notified her that we discussed her HA1C level 8.7 at selection committee and that we want her to work with our bariatrician to help lower her glucose levels prior to surgery.  appt scheduled for 8/22/19.

## 2019-08-21 NOTE — TELEPHONE ENCOUNTER
Called patient - call transferred to Bárbara Alan RN for discussion of glucose control for surgery

## 2019-08-22 ENCOUNTER — LAB VISIT (OUTPATIENT)
Dept: LAB | Facility: HOSPITAL | Age: 44
End: 2019-08-22
Attending: INTERNAL MEDICINE
Payer: COMMERCIAL

## 2019-08-22 ENCOUNTER — OFFICE VISIT (OUTPATIENT)
Dept: BARIATRICS | Facility: CLINIC | Age: 44
End: 2019-08-22
Payer: COMMERCIAL

## 2019-08-22 ENCOUNTER — PATIENT MESSAGE (OUTPATIENT)
Dept: BARIATRICS | Facility: CLINIC | Age: 44
End: 2019-08-22

## 2019-08-22 VITALS
BODY MASS INDEX: 42.15 KG/M2 | WEIGHT: 237.88 LBS | HEART RATE: 74 BPM | SYSTOLIC BLOOD PRESSURE: 135 MMHG | HEIGHT: 63 IN | DIASTOLIC BLOOD PRESSURE: 70 MMHG

## 2019-08-22 DIAGNOSIS — E11.9 TYPE 2 DIABETES MELLITUS WITHOUT COMPLICATION, WITHOUT LONG-TERM CURRENT USE OF INSULIN: ICD-10-CM

## 2019-08-22 DIAGNOSIS — E11.9 TYPE 2 DIABETES MELLITUS WITHOUT COMPLICATION, WITHOUT LONG-TERM CURRENT USE OF INSULIN: Primary | ICD-10-CM

## 2019-08-22 DIAGNOSIS — R10.13 EPIGASTRIC ABDOMINAL PAIN: ICD-10-CM

## 2019-08-22 DIAGNOSIS — R10.13 EPIGASTRIC ABDOMINAL PAIN: Primary | ICD-10-CM

## 2019-08-22 LAB
AMYLASE SERPL-CCNC: 39 U/L (ref 20–110)
ANION GAP SERPL CALC-SCNC: 12 MMOL/L (ref 8–16)
BUN SERPL-MCNC: 13 MG/DL (ref 6–20)
CALCIUM SERPL-MCNC: 9.5 MG/DL (ref 8.7–10.5)
CHLORIDE SERPL-SCNC: 102 MMOL/L (ref 95–110)
CO2 SERPL-SCNC: 26 MMOL/L (ref 23–29)
CREAT SERPL-MCNC: 0.6 MG/DL (ref 0.5–1.4)
EST. GFR  (AFRICAN AMERICAN): >60 ML/MIN/1.73 M^2
EST. GFR  (NON AFRICAN AMERICAN): >60 ML/MIN/1.73 M^2
GLUCOSE SERPL-MCNC: 145 MG/DL (ref 70–110)
LIPASE SERPL-CCNC: 36 U/L (ref 4–60)
POTASSIUM SERPL-SCNC: 4.1 MMOL/L (ref 3.5–5.1)
SODIUM SERPL-SCNC: 140 MMOL/L (ref 136–145)

## 2019-08-22 PROCEDURE — 3075F SYST BP GE 130 - 139MM HG: CPT | Mod: CPTII,S$GLB,, | Performed by: INTERNAL MEDICINE

## 2019-08-22 PROCEDURE — 82150 ASSAY OF AMYLASE: CPT

## 2019-08-22 PROCEDURE — 83690 ASSAY OF LIPASE: CPT

## 2019-08-22 PROCEDURE — 3045F PR MOST RECENT HEMOGLOBIN A1C LEVEL 7.0-9.0%: CPT | Mod: CPTII,S$GLB,, | Performed by: INTERNAL MEDICINE

## 2019-08-22 PROCEDURE — 99999 PR PBB SHADOW E&M-EST. PATIENT-LVL III: CPT | Mod: PBBFAC,,, | Performed by: INTERNAL MEDICINE

## 2019-08-22 PROCEDURE — 3078F DIAST BP <80 MM HG: CPT | Mod: CPTII,S$GLB,, | Performed by: INTERNAL MEDICINE

## 2019-08-22 PROCEDURE — 80048 BASIC METABOLIC PNL TOTAL CA: CPT

## 2019-08-22 PROCEDURE — 36415 COLL VENOUS BLD VENIPUNCTURE: CPT

## 2019-08-22 PROCEDURE — 99214 OFFICE O/P EST MOD 30 MIN: CPT | Mod: S$GLB,,, | Performed by: INTERNAL MEDICINE

## 2019-08-22 PROCEDURE — 3008F BODY MASS INDEX DOCD: CPT | Mod: CPTII,S$GLB,, | Performed by: INTERNAL MEDICINE

## 2019-08-22 PROCEDURE — 3045F PR MOST RECENT HEMOGLOBIN A1C LEVEL 7.0-9.0%: ICD-10-PCS | Mod: CPTII,S$GLB,, | Performed by: INTERNAL MEDICINE

## 2019-08-22 PROCEDURE — 3075F PR MOST RECENT SYSTOLIC BLOOD PRESS GE 130-139MM HG: ICD-10-PCS | Mod: CPTII,S$GLB,, | Performed by: INTERNAL MEDICINE

## 2019-08-22 PROCEDURE — 99214 PR OFFICE/OUTPT VISIT, EST, LEVL IV, 30-39 MIN: ICD-10-PCS | Mod: S$GLB,,, | Performed by: INTERNAL MEDICINE

## 2019-08-22 PROCEDURE — 99999 PR PBB SHADOW E&M-EST. PATIENT-LVL III: ICD-10-PCS | Mod: PBBFAC,,, | Performed by: INTERNAL MEDICINE

## 2019-08-22 PROCEDURE — 3078F PR MOST RECENT DIASTOLIC BLOOD PRESSURE < 80 MM HG: ICD-10-PCS | Mod: CPTII,S$GLB,, | Performed by: INTERNAL MEDICINE

## 2019-08-22 PROCEDURE — 3008F PR BODY MASS INDEX (BMI) DOCUMENTED: ICD-10-PCS | Mod: CPTII,S$GLB,, | Performed by: INTERNAL MEDICINE

## 2019-08-22 RX ORDER — REPAGLINIDE 1 MG/1
1 TABLET ORAL
Qty: 270 TABLET | Refills: 0 | Status: SHIPPED | OUTPATIENT
Start: 2019-08-22 | End: 2019-10-30

## 2019-08-22 RX ORDER — ATORVASTATIN CALCIUM 20 MG/1
TABLET, FILM COATED ORAL
COMMUNITY
Start: 2019-06-18 | End: 2022-04-29

## 2019-08-22 NOTE — PROGRESS NOTES
"Subjective:       Patient ID: Leora Steward is a 43 y.o. female.    Chief Complaint: Follow-up (control cbg for surgery- last was 8.7)    Pt presents today for optimization of A1c prior to weight loss surgery.    Home BS readings in 180s-200 both fasting PP.   B: Scrambled egg. Toast or waffle.   L: grilled chicken, salad  D: chicken or pork- grilled or baked and mixed veg  Sn: protein shake (1 g sugar), [id number]  Dr: Water    Procedure planned: LRNY    Surgeon: Dr. Ulrich    Last Labs: Lab Results       Component                Value               Date                       HGBA1C                   8.7 (H)             08/12/2019                 HGBA1C                   9.3 (H)             06/19/2019            Lab Results       Component                Value               Date                       LDLCALC                  126.0               06/19/2019                 CREATININE               0.8                 08/12/2019               Current DM meds:  Metformin- some diarrhea  0.5mg ozempic- Has been on it about 2 months. She does state it has cut her appetite. States she has some pain in the epigastric area that last most of the week. Also had at the 0.25 mg. Does state pain improving over the past week. May have 1 dose left at home.     Previous meds tried:   Farxiga  Actos  Victoza and trulicity - also had some belly pain with those. Took them for 1-2 months. Numbers were not improving, so MD stopped them.     Last RD notes and recs reviewed.   "Discussed diet after surgery and related to patient's food record.  Reviewed nutrition guidelines for before and after surgery.  Answered all questions.  Continue to review Bariatric Nutrition Guidebook at home and call with any questions.  Work on expanding variety of vegetables.  Work on gradually cutting back on starchy CHO in the diet.  Begin trying various protein supplements to determine preference.  5-6 meals per day.  Return to clinic.  keep food " "log"       Review of Systems   Constitutional: Negative for chills and fever.   Respiratory: Negative for cough and shortness of breath.    Cardiovascular: Negative for chest pain and leg swelling.   Gastrointestinal: Positive for abdominal pain and diarrhea.   Genitourinary: Negative for difficulty urinating and dysuria.   Musculoskeletal: Negative for arthralgias and back pain.   Neurological: Negative for dizziness and light-headedness.   Psychiatric/Behavioral: Negative for dysphoric mood. The patient is not nervous/anxious.        Objective:     /70   Pulse 74   Ht 5' 3" (1.6 m)   Wt 107.9 kg (237 lb 14 oz)   BMI 42.14 kg/m²    Physical Exam   Constitutional: She is oriented to person, place, and time. She appears well-developed and well-nourished.   HENT:   Head: Normocephalic and atraumatic.   Eyes: Pupils are equal, round, and reactive to light. EOM are normal.   Neck: Normal range of motion. Neck supple.   Cardiovascular: Normal rate.   Pulmonary/Chest: Effort normal.   Musculoskeletal: Normal range of motion. She exhibits no edema.   Neurological: She is alert and oriented to person, place, and time. No cranial nerve deficit.   Skin: Skin is warm and dry. No erythema.   Psychiatric: She has a normal mood and affect. Her behavior is normal. Judgment normal.   Vitals reviewed.      Assessment:       1. Epigastric abdominal pain    2. Type 2 diabetes mellitus without complication, without long-term current use of insulin        Plan:           Leora was seen today for follow-up.    Diagnoses and all orders for this visit:    Epigastric abdominal pain  -     Amylase; Future  -     Lipase; Future    Type 2 diabetes mellitus without complication, without long-term current use of insulin  -     Basic metabolic panel; Future        If she has another good week, without belly pain, she will let me know,and I can send in the 1 mg ozempic pen.     I have sent in the prandin 1 mg her you to start 3 times a " day with meals.     She send us in some blood sugar readings  weekly. We will also check aa fructosamine in 3 weeks. I think if these are ok we should be able to move forward with scheduling, and get the A1c repeated at her preop appointment so we have it for documentation as well. That should give it more time to change.     Her blood sugar today was 145,      Pt can stop the prandin and ozempic once she is NPO for surgery.    Continue diet per dietitians.       Minimize sweets and starchy carbohydrates (bread, rice, pasta, potatoes, corn, peas, oatmeal, grits, tortillas, crackers, chips)       grams of protein daily.

## 2019-08-22 NOTE — PATIENT INSTRUCTIONS
Send in blood sugar readings weekly.     Labs today.       We will send you a message with updated plan and meds when lab results come in.     Continue diet per dietitians.       Minimize sweets and starchy carbohydrates (bread, rice, pasta, potatoes, corn, peas, oatmeal, grits, tortillas, crackers, chips)       grams of protein daily.

## 2019-08-26 ENCOUNTER — PATIENT MESSAGE (OUTPATIENT)
Dept: BARIATRICS | Facility: CLINIC | Age: 44
End: 2019-08-26

## 2019-08-28 ENCOUNTER — PATIENT MESSAGE (OUTPATIENT)
Dept: BARIATRICS | Facility: CLINIC | Age: 44
End: 2019-08-28

## 2019-09-03 ENCOUNTER — TELEPHONE (OUTPATIENT)
Dept: BARIATRICS | Facility: CLINIC | Age: 44
End: 2019-09-03

## 2019-09-09 ENCOUNTER — LAB VISIT (OUTPATIENT)
Dept: LAB | Facility: HOSPITAL | Age: 44
End: 2019-09-09
Attending: INTERNAL MEDICINE
Payer: COMMERCIAL

## 2019-09-09 DIAGNOSIS — E11.9 TYPE 2 DIABETES MELLITUS WITHOUT COMPLICATION, WITHOUT LONG-TERM CURRENT USE OF INSULIN: ICD-10-CM

## 2019-09-09 PROCEDURE — 36415 COLL VENOUS BLD VENIPUNCTURE: CPT

## 2019-09-09 PROCEDURE — 82985 ASSAY OF GLYCATED PROTEIN: CPT

## 2019-09-10 ENCOUNTER — PATIENT MESSAGE (OUTPATIENT)
Dept: BARIATRICS | Facility: CLINIC | Age: 44
End: 2019-09-10

## 2019-09-11 ENCOUNTER — PATIENT MESSAGE (OUTPATIENT)
Dept: BARIATRICS | Facility: CLINIC | Age: 44
End: 2019-09-11

## 2019-09-12 ENCOUNTER — PATIENT MESSAGE (OUTPATIENT)
Dept: BARIATRICS | Facility: CLINIC | Age: 44
End: 2019-09-12

## 2019-09-12 LAB — FRUCTOSAMINE SERPL-SCNC: 225 UMOL /L (ref 151–300)

## 2019-09-12 NOTE — TELEPHONE ENCOUNTER
Fructosamine is within normal limits. Recent Blood sugar logs show patients blood sugar to be under good control.  we should be able to move forward with scheduling, and get the A1c repeated at her preop appointment so we have it for documentation as well.  Is should be below 8.5 % for surgery.

## 2019-09-16 ENCOUNTER — PATIENT MESSAGE (OUTPATIENT)
Dept: BARIATRICS | Facility: CLINIC | Age: 44
End: 2019-09-16

## 2019-09-19 ENCOUNTER — TELEPHONE (OUTPATIENT)
Dept: BARIATRICS | Facility: CLINIC | Age: 44
End: 2019-09-19

## 2019-09-19 DIAGNOSIS — R79.89 LOW VITAMIN D LEVEL: ICD-10-CM

## 2019-09-19 DIAGNOSIS — Z01.818 PREOP TESTING: ICD-10-CM

## 2019-09-19 DIAGNOSIS — J44.89 COPD WITH CHRONIC BRONCHITIS: ICD-10-CM

## 2019-09-19 DIAGNOSIS — I10 ESSENTIAL HYPERTENSION: ICD-10-CM

## 2019-09-19 DIAGNOSIS — E66.01 MORBID OBESITY: Primary | ICD-10-CM

## 2019-09-19 DIAGNOSIS — E11.9 TYPE 2 DIABETES MELLITUS WITHOUT COMPLICATION, WITHOUT LONG-TERM CURRENT USE OF INSULIN: ICD-10-CM

## 2019-09-19 NOTE — TELEPHONE ENCOUNTER
Spoke with patient and scheduled preop appts/ surgery date/ post op appts. All dates and times agreed upon. Pt aware that they must have PCP clearance within 60 days of surgery- it should be dated, signed and in chart for preop appointment. Pt aware that protein liquid diet start date is 10/2/19.  Screened patient for history of UTI per protocol.   Discussed with patient to avoid antibiotics and elective procedures involving sedation/anesthesia within 30 days of surgery.  Patient instructed to call the bariatric clinic post op for any s/s of UTI.  Pt aware of all appts can be seen in my [facility] patient portal at this time and appt reminders mailed to patient's address today by RN.  Office phone and fax number given to patient for any future questions/concerns. Also discussed the fiorinal the patient takes for migraines and asked that she get it changed to Fioricet by her PCP, she is calling to make an appointment immediately.  Also she has been opening up the capsule of the Effexor XR and tolerating it quite well.

## 2019-09-26 ENCOUNTER — TELEPHONE (OUTPATIENT)
Dept: BARIATRICS | Facility: CLINIC | Age: 44
End: 2019-09-26

## 2019-09-26 NOTE — TELEPHONE ENCOUNTER
Phoned patient to discuss PCP clearance.  She went to Dr Augustine yesterday and will bring it in on 10/1 at her appt with Dr Ulrich.  Also informed her that she would need to sign the Mental Health Contract at that visit also.  She verbalized understanding.

## 2019-10-01 ENCOUNTER — LAB VISIT (OUTPATIENT)
Dept: LAB | Facility: HOSPITAL | Age: 44
End: 2019-10-01
Payer: COMMERCIAL

## 2019-10-01 ENCOUNTER — OFFICE VISIT (OUTPATIENT)
Dept: BARIATRICS | Facility: CLINIC | Age: 44
End: 2019-10-01
Payer: COMMERCIAL

## 2019-10-01 ENCOUNTER — CLINICAL SUPPORT (OUTPATIENT)
Dept: BARIATRICS | Facility: CLINIC | Age: 44
End: 2019-10-01
Payer: COMMERCIAL

## 2019-10-01 VITALS
HEART RATE: 92 BPM | WEIGHT: 222.44 LBS | RESPIRATION RATE: 18 BRPM | DIASTOLIC BLOOD PRESSURE: 66 MMHG | BODY MASS INDEX: 39.41 KG/M2 | SYSTOLIC BLOOD PRESSURE: 100 MMHG | HEIGHT: 63 IN

## 2019-10-01 DIAGNOSIS — E66.01 MORBID OBESITY: ICD-10-CM

## 2019-10-01 DIAGNOSIS — E66.01 MORBID OBESITY WITH BMI OF 40.0-44.9, ADULT: ICD-10-CM

## 2019-10-01 DIAGNOSIS — I10 ESSENTIAL HYPERTENSION: ICD-10-CM

## 2019-10-01 DIAGNOSIS — R79.89 LOW VITAMIN D LEVEL: ICD-10-CM

## 2019-10-01 DIAGNOSIS — J44.89 COPD WITH CHRONIC BRONCHITIS: ICD-10-CM

## 2019-10-01 DIAGNOSIS — E66.01 MORBID OBESITY: Primary | ICD-10-CM

## 2019-10-01 DIAGNOSIS — E11.9 TYPE 2 DIABETES MELLITUS WITHOUT COMPLICATION, WITHOUT LONG-TERM CURRENT USE OF INSULIN: ICD-10-CM

## 2019-10-01 DIAGNOSIS — Z01.818 PREOP TESTING: ICD-10-CM

## 2019-10-01 LAB
ALBUMIN SERPL BCP-MCNC: 4.3 G/DL (ref 3.5–5.2)
ALP SERPL-CCNC: 108 U/L (ref 55–135)
ALT SERPL W/O P-5'-P-CCNC: 25 U/L (ref 10–44)
ANION GAP SERPL CALC-SCNC: 13 MMOL/L (ref 8–16)
AST SERPL-CCNC: 25 U/L (ref 10–40)
BASOPHILS # BLD AUTO: 0.05 K/UL (ref 0–0.2)
BASOPHILS NFR BLD: 0.5 % (ref 0–1.9)
BILIRUB SERPL-MCNC: 0.4 MG/DL (ref 0.1–1)
BUN SERPL-MCNC: 12 MG/DL (ref 6–20)
CALCIUM SERPL-MCNC: 9.7 MG/DL (ref 8.7–10.5)
CHLORIDE SERPL-SCNC: 104 MMOL/L (ref 95–110)
CO2 SERPL-SCNC: 22 MMOL/L (ref 23–29)
CREAT SERPL-MCNC: 0.8 MG/DL (ref 0.5–1.4)
DIFFERENTIAL METHOD: ABNORMAL
EOSINOPHIL # BLD AUTO: 0.1 K/UL (ref 0–0.5)
EOSINOPHIL NFR BLD: 0.6 % (ref 0–8)
ERYTHROCYTE [DISTWIDTH] IN BLOOD BY AUTOMATED COUNT: 13.1 % (ref 11.5–14.5)
EST. GFR  (AFRICAN AMERICAN): >60 ML/MIN/1.73 M^2
EST. GFR  (NON AFRICAN AMERICAN): >60 ML/MIN/1.73 M^2
ESTIMATED AVG GLUCOSE: 143 MG/DL (ref 68–131)
GLUCOSE SERPL-MCNC: 102 MG/DL (ref 70–110)
HBA1C MFR BLD HPLC: 6.6 % (ref 4–5.6)
HCT VFR BLD AUTO: 44.3 % (ref 37–48.5)
HGB BLD-MCNC: 13.5 G/DL (ref 12–16)
IMM GRANULOCYTES # BLD AUTO: 0.03 K/UL (ref 0–0.04)
IMM GRANULOCYTES NFR BLD AUTO: 0.3 % (ref 0–0.5)
LYMPHOCYTES # BLD AUTO: 3.3 K/UL (ref 1–4.8)
LYMPHOCYTES NFR BLD: 29.8 % (ref 18–48)
MCH RBC QN AUTO: 28.5 PG (ref 27–31)
MCHC RBC AUTO-ENTMCNC: 30.5 G/DL (ref 32–36)
MCV RBC AUTO: 94 FL (ref 82–98)
MONOCYTES # BLD AUTO: 0.7 K/UL (ref 0.3–1)
MONOCYTES NFR BLD: 6.8 % (ref 4–15)
NEUTROPHILS # BLD AUTO: 6.8 K/UL (ref 1.8–7.7)
NEUTROPHILS NFR BLD: 62 % (ref 38–73)
NRBC BLD-RTO: 0 /100 WBC
PLATELET # BLD AUTO: 384 K/UL (ref 150–350)
PMV BLD AUTO: 10.4 FL (ref 9.2–12.9)
POTASSIUM SERPL-SCNC: 4.5 MMOL/L (ref 3.5–5.1)
PROT SERPL-MCNC: 8 G/DL (ref 6–8.4)
RBC # BLD AUTO: 4.74 M/UL (ref 4–5.4)
SODIUM SERPL-SCNC: 139 MMOL/L (ref 136–145)
WBC # BLD AUTO: 10.94 K/UL (ref 3.9–12.7)

## 2019-10-01 PROCEDURE — 3074F SYST BP LT 130 MM HG: CPT | Mod: CPTII,S$GLB,, | Performed by: SURGERY

## 2019-10-01 PROCEDURE — 99214 PR OFFICE/OUTPT VISIT, EST, LEVL IV, 30-39 MIN: ICD-10-PCS | Mod: S$GLB,,, | Performed by: SURGERY

## 2019-10-01 PROCEDURE — 99499 UNLISTED E&M SERVICE: CPT | Mod: S$GLB,,, | Performed by: DIETITIAN, REGISTERED

## 2019-10-01 PROCEDURE — 99999 PR PBB SHADOW E&M-EST. PATIENT-LVL I: ICD-10-PCS | Mod: PBBFAC,,, | Performed by: DIETITIAN, REGISTERED

## 2019-10-01 PROCEDURE — 85025 COMPLETE CBC W/AUTO DIFF WBC: CPT

## 2019-10-01 PROCEDURE — 3008F BODY MASS INDEX DOCD: CPT | Mod: CPTII,S$GLB,, | Performed by: SURGERY

## 2019-10-01 PROCEDURE — 3078F DIAST BP <80 MM HG: CPT | Mod: CPTII,S$GLB,, | Performed by: SURGERY

## 2019-10-01 PROCEDURE — 80053 COMPREHEN METABOLIC PANEL: CPT

## 2019-10-01 PROCEDURE — 3078F PR MOST RECENT DIASTOLIC BLOOD PRESSURE < 80 MM HG: ICD-10-PCS | Mod: CPTII,S$GLB,, | Performed by: SURGERY

## 2019-10-01 PROCEDURE — 99499 NO LOS: ICD-10-PCS | Mod: S$GLB,,, | Performed by: DIETITIAN, REGISTERED

## 2019-10-01 PROCEDURE — 3008F PR BODY MASS INDEX (BMI) DOCUMENTED: ICD-10-PCS | Mod: CPTII,S$GLB,, | Performed by: SURGERY

## 2019-10-01 PROCEDURE — 3074F PR MOST RECENT SYSTOLIC BLOOD PRESSURE < 130 MM HG: ICD-10-PCS | Mod: CPTII,S$GLB,, | Performed by: SURGERY

## 2019-10-01 PROCEDURE — 99214 OFFICE O/P EST MOD 30 MIN: CPT | Mod: S$GLB,,, | Performed by: SURGERY

## 2019-10-01 PROCEDURE — 83036 HEMOGLOBIN GLYCOSYLATED A1C: CPT

## 2019-10-01 PROCEDURE — 99999 PR PBB SHADOW E&M-EST. PATIENT-LVL III: CPT | Mod: PBBFAC,,, | Performed by: SURGERY

## 2019-10-01 PROCEDURE — 99999 PR PBB SHADOW E&M-EST. PATIENT-LVL III: ICD-10-PCS | Mod: PBBFAC,,, | Performed by: SURGERY

## 2019-10-01 PROCEDURE — 36415 COLL VENOUS BLD VENIPUNCTURE: CPT

## 2019-10-01 PROCEDURE — 99999 PR PBB SHADOW E&M-EST. PATIENT-LVL I: CPT | Mod: PBBFAC,,, | Performed by: DIETITIAN, REGISTERED

## 2019-10-01 RX ORDER — MULTIVIT WITH MINERALS/HERBS
1 TABLET ORAL DAILY
COMMUNITY

## 2019-10-01 RX ORDER — FAMOTIDINE 10 MG/ML
20 INJECTION INTRAVENOUS ONCE
Status: CANCELLED | OUTPATIENT
Start: 2019-10-01 | End: 2019-10-01

## 2019-10-01 RX ORDER — THIAMINE HCL 50 MG
50 TABLET ORAL DAILY
COMMUNITY

## 2019-10-01 RX ORDER — SODIUM CITRATE AND CITRIC ACID MONOHYDRATE 334; 500 MG/5ML; MG/5ML
15 SOLUTION ORAL ONCE
Status: CANCELLED | OUTPATIENT
Start: 2019-10-01 | End: 2019-10-01

## 2019-10-01 RX ORDER — METOCLOPRAMIDE HYDROCHLORIDE 5 MG/ML
10 INJECTION INTRAMUSCULAR; INTRAVENOUS ONCE
Status: CANCELLED | OUTPATIENT
Start: 2019-10-01 | End: 2019-10-01

## 2019-10-01 RX ORDER — HYDROCODONE BITARTRATE AND ACETAMINOPHEN 7.5; 325 MG/15ML; MG/15ML
15 SOLUTION ORAL 4 TIMES DAILY PRN
Qty: 473 ML | Refills: 0 | Status: SHIPPED | OUTPATIENT
Start: 2019-10-01 | End: 2019-10-30

## 2019-10-01 RX ORDER — POLYETHYLENE GLYCOL 3350 17 G/17G
17 POWDER, FOR SOLUTION ORAL DAILY
Qty: 510 G | Refills: 3 | Status: SHIPPED | OUTPATIENT
Start: 2019-10-01 | End: 2023-02-15 | Stop reason: CLARIF

## 2019-10-01 RX ORDER — HEPARIN SODIUM 5000 [USP'U]/ML
5000 INJECTION, SOLUTION INTRAVENOUS; SUBCUTANEOUS ONCE
Status: CANCELLED | OUTPATIENT
Start: 2019-10-01 | End: 2019-10-01

## 2019-10-01 RX ORDER — MULTIVITAMIN
1 TABLET ORAL 2 TIMES DAILY
COMMUNITY

## 2019-10-01 RX ORDER — OMEPRAZOLE 40 MG/1
40 CAPSULE, DELAYED RELEASE ORAL EVERY MORNING
Qty: 30 CAPSULE | Refills: 2 | Status: SHIPPED | OUTPATIENT
Start: 2019-10-01 | End: 2022-04-29

## 2019-10-01 RX ORDER — ONDANSETRON 8 MG/1
8 TABLET, ORALLY DISINTEGRATING ORAL EVERY 6 HOURS PRN
Qty: 30 TABLET | Refills: 0 | Status: SHIPPED | OUTPATIENT
Start: 2019-10-01 | End: 2019-11-07 | Stop reason: SDUPTHER

## 2019-10-01 NOTE — H&P (VIEW-ONLY)
History & Physical    SUBJECTIVE:     History of Present Illness:  Leora Steward is a 43 y.o. female who presents for pre-operative exam for weight loss surgery.  she has completed her pre-operative evaluation.  she has failed medical treatment for obesity.  1. Morbid Obesity with Body mass index is 43.91 kg/m². and inability to lose weight.  2. Co-morbidities: Anxiety/depression, COPD, HTN, DM type 2 no complications    Chief Complaint   Patient presents with    Pre-op Exam       Review of patient's allergies indicates:  No Known Allergies    Current Outpatient Medications   Medication Sig    atorvastatin (LIPITOR) 20 MG tablet     b complex vitamins tablet Take 1 tablet by mouth once daily. Vitamin B 12 500 mcg sublingual    CALCIUM CITRATE ORAL Take 1 tablet by mouth 3 (three) times daily.    lamoTRIgine (LAMICTAL) 25 MG tablet Take 25 mg by mouth 2 (two) times daily.     metFORMIN (GLUCOPHAGE) 1000 MG tablet Take 1,000 mg by mouth 2 (two) times daily with meals.    multivitamin (ONE DAILY MULTIVITAMIN) per tablet Take 1 tablet by mouth 2 (two) times daily.    ONETOUCH ULTRA BLUE TEST STRIP Strp     repaglinide (PRANDIN) 1 MG tablet Take 1 tablet (1 mg total) by mouth 3 (three) times daily before meals.    semaglutide (OZEMPIC) 1 mg/dose (2 mg/1.5 mL) PnIj Inject 1 mg subq weekly    thiamine (VITAMIN B-1) 50 MG tablet Take 50 mg by mouth once daily.    venlafaxine (EFFEXOR-XR) 150 MG Cp24 Take 150 mg by mouth once daily.     budesonide-formoterol 160-4.5 mcg (SYMBICORT) 160-4.5 mcg/actuation HFAA Inhale 2 puffs into the lungs daily as needed. Controller    butalbital-aspirin-caffeine -40 mg (FIORINAL) -40 mg Cap Take 2 capsules by mouth every 4 (four) hours as needed.    fenoprofen (NAPROFEN) 600 mg Tab Take 1 tablet by mouth 3 (three) times daily as needed.    gabapentin (NEURONTIN) 300 MG capsule Take 300 mg by mouth 3 (three) times daily.    lisinopril-hydrochlorothiazide  "(PRINZIDE,ZESTORETIC) 20-12.5 mg per tablet Take 1 tablet by mouth once daily.    tiZANidine (ZANAFLEX) 4 MG tablet Take 4 mg by mouth every 6 (six) hours as needed.     No current facility-administered medications for this visit.        Past Medical History:   Diagnosis Date    Anxiety     Arthritis     COPD (chronic obstructive pulmonary disease)     Depression     Diabetes mellitus     Hypertension     Neuromuscular disorder        Past Surgical History:   Procedure Laterality Date    CHOLECYSTECTOMY      HYSTERECTOMY      full    TUBAL LIGATION         Review of Systems   Constitutional: Negative for fever and malaise/fatigue.   HENT: Negative for congestion and sinus pain.    Respiratory: Negative for cough and shortness of breath.    Cardiovascular: Negative for chest pain and palpitations.   Gastrointestinal: Positive for diarrhea. Negative for abdominal pain, constipation, heartburn, nausea and vomiting.        Diarrhea with metformin   Genitourinary: Negative for dysuria and urgency.   Musculoskeletal: Negative for neck pain.   Endo/Heme/Allergies: Does not bruise/bleed easily.          OBJECTIVE:     Vitals:    10/01/19 1346   BP: 100/66   Pulse: 92   Resp: 18   Weight: 100.9 kg (222 lb 7.1 oz)   Height: 5' 3" (1.6 m)       Physical Exam   Constitutional: She is oriented to person, place, and time and well-developed, well-nourished, and in no distress.   Cardiovascular: Normal rate, regular rhythm and normal heart sounds.   Pulmonary/Chest: Effort normal and breath sounds normal.   Abdominal: Soft. Bowel sounds are normal. There is no tenderness.   Neurological: She is alert and oriented to person, place, and time.   Skin: Skin is warm and dry.   Psychiatric: Mood, memory, affect and judgment normal.   Vitals reviewed.       Laboratory  CBC: Reviewed  CMP: Reviewed    Diagnostic Results:  ECG: Reviewed  X-Ray: Reviewed     Dietitian: Patient has participated in pre-operative nutritional program " with understanding of necessary lifelong dietary changes required with surgery.    Psych: No overt contraindications to bariatric surgery, patient has completed psychological evaluation and is able to give informed consent.    Clearance: pcp    ASSESSMENT/PLAN:     Morbid obesity with failure of medical conservative therapy.    Co Morbid Conditions:   Patient Active Problem List   Diagnosis    Type 2 diabetes mellitus without complication, without long-term current use of insulin    Essential hypertension    Arthritis of neck    Anxiety    Depression    Chronic bronchitis    Morbid obesity with BMI of 40.0-44.9, adult       Patient wishes to undergo laparoscopic Almas-en-Y 150 cm almas limb.      The patient was informed that risks include, but are not limited to: death, leak, obstruction, bleeding, and sepsis. Any of these could require further surgery. Other risks include DVT, PE, pneumonia, wound dehiscence, hernia, wound infection, the need for dilatations and the inability to lose appropriate weight and keep it off.     We discussed that our goal is to ameliorate her medical problems and not to obtain a specific body mass index. she understands the risks and benefits and wishes to proceed with the procedure.  she has signed a consent form.

## 2019-10-01 NOTE — PROGRESS NOTES
Pt will use premier and muscle milk RTD and premier clear  protein shakes.  If using protein powder, reminded pt of mixing with water for days 1 and 2, by day 3 may try using skim, 1% milk or unsweetened almond/soy milk.  By Day 4, may use RTD protein shakes as tolerated  Pt has the following vitamins and minerals to start taking once discharged from hospital.  Pt using all tablets. Will use crush and use  Multivitamin Equate twice  B-complex 50 mg daily  Calcium citrate 500 mg three times per day  Vitamin B-12 500 mcg sublingually  Reviewed dosage and timing of vitamin/mineral guidelines.  Reviewed nutritional guidelines for protein and fluid requirements for week 1 and week 2 post-surgery.  Handout provided to log protein and fluid daily.  1 ounce medicine cups provided for patient to measure fluid intake after surgery.  Reviewed common nutritional concerns and prevention tips after bariatric surgery  Pt verbalized understanding of information provided with appropriate questions and comments.

## 2019-10-09 ENCOUNTER — TELEPHONE (OUTPATIENT)
Dept: BARIATRICS | Facility: CLINIC | Age: 44
End: 2019-10-09

## 2019-10-09 NOTE — TELEPHONE ENCOUNTER
Called patient to discuss liquid diet and vitamins.    Pt using  Premier protein shakes     Discussion:  -  gms of protein per day  - 600-800 calories per day   - Less than 4gm sugar per shake  - SF, Decaf, non-carbonated Fluids  - No Fruits, juices, yogurt or pudding on liquid diet  - No vitamins or minerals for 1 week prior to surgery    Remind pt per nursing and medical team to inform our department if taking antibiotics within the 30 days post bariatric surgery or it any other surgeries/procedures are scheduled within 30 days after bariatric surgery.    Reminded pt of pre-op and surgery dates.    Pt to call back with any questions.        ----- Message from Cherry Dennis RD sent at 10/2/2019  8:45 AM CDT -----  1 week liquid diet starts 10/9/19   [facility] surgery scheduled 10/16/19   preop appt scheduled 10/1/19

## 2019-10-15 ENCOUNTER — TELEPHONE (OUTPATIENT)
Dept: BARIATRICS | Facility: CLINIC | Age: 44
End: 2019-10-15

## 2019-10-15 NOTE — TELEPHONE ENCOUNTER
Notified patient of arrival time to the [facility] 2nd floor [facility] at 0930 with expected surgery start time 1130 on 10/16/19.   Instructed patient regarding pre-op instructions including npo status, showering and preop medications to hold/take per anesthesia/preop.  Instructed patient on the s/s of dehydration and for patient to call at the first sign of dehydration.  Informed patient that someone from bariatrics will be call them 1 week post op to review diet/fluid intake and to ensure adequate hydration.   Pt verbalized understanding. Pt given office phone number for any additional questions/concerns. Also reminded not to take ABx or schedule elective procedures with sedation or anesthesia for 30 days after surgery unless checking with us first.  She verbalized understanding.

## 2019-10-15 NOTE — PRE-PROCEDURE INSTRUCTIONS
PREOP INSTRUCTIONS:Patient instructed to follow dietary instructions issued by 's office.Morning medications may be taken with a few sips of water.Shower instructions as well as directions to the 2nd floor Surgery Center were given.Patient encouraged to wear loose fitting,comfortable clothing.Medication instructions for pm prior to and am of procedure reviewed.Patient stated an understanding.Patient also stated that her blood sugars had been good lately.    Patient denies any side effects or issues with anesthesia or sedation.    Patient does not know arrival time.Explained that this information comes from the surgeon's office and if they haven't heard from them by 2 or 3 pm to call the office.Patient stated an understanding.

## 2019-10-16 ENCOUNTER — HOSPITAL ENCOUNTER (INPATIENT)
Facility: HOSPITAL | Age: 44
LOS: 1 days | Discharge: HOME | DRG: 621 | End: 2019-10-17
Attending: SURGERY | Admitting: SURGERY
Payer: COMMERCIAL

## 2019-10-16 ENCOUNTER — ANESTHESIA (OUTPATIENT)
Dept: SURGERY | Facility: HOSPITAL | Age: 44
DRG: 621 | End: 2019-10-16
Payer: COMMERCIAL

## 2019-10-16 ENCOUNTER — ANESTHESIA EVENT (OUTPATIENT)
Dept: SURGERY | Facility: HOSPITAL | Age: 44
DRG: 621 | End: 2019-10-16
Payer: COMMERCIAL

## 2019-10-16 DIAGNOSIS — E66.01 MORBID OBESITY: Primary | ICD-10-CM

## 2019-10-16 LAB
POCT GLUCOSE: 104 MG/DL (ref 70–110)
POCT GLUCOSE: 117 MG/DL (ref 70–110)
POCT GLUCOSE: 81 MG/DL (ref 70–110)

## 2019-10-16 PROCEDURE — 63600175 PHARM REV CODE 636 W HCPCS: Performed by: STUDENT IN AN ORGANIZED HEALTH CARE EDUCATION/TRAINING PROGRAM

## 2019-10-16 PROCEDURE — 25000003 PHARM REV CODE 250: Performed by: SURGERY

## 2019-10-16 PROCEDURE — 63600175 PHARM REV CODE 636 W HCPCS: Performed by: NURSE ANESTHETIST, CERTIFIED REGISTERED

## 2019-10-16 PROCEDURE — 36000710: Performed by: SURGERY

## 2019-10-16 PROCEDURE — 37000008 HC ANESTHESIA 1ST 15 MINUTES: Performed by: SURGERY

## 2019-10-16 PROCEDURE — 37000009 HC ANESTHESIA EA ADD 15 MINS: Performed by: SURGERY

## 2019-10-16 PROCEDURE — D9220A PRA ANESTHESIA: ICD-10-PCS | Mod: ANES,,, | Performed by: ANESTHESIOLOGY

## 2019-10-16 PROCEDURE — S0028 INJECTION, FAMOTIDINE, 20 MG: HCPCS | Performed by: SURGERY

## 2019-10-16 PROCEDURE — S0028 INJECTION, FAMOTIDINE, 20 MG: HCPCS | Performed by: STUDENT IN AN ORGANIZED HEALTH CARE EDUCATION/TRAINING PROGRAM

## 2019-10-16 PROCEDURE — 43644 LAP GASTRIC BYPASS/ROUX-EN-Y: CPT | Mod: ,,, | Performed by: SURGERY

## 2019-10-16 PROCEDURE — 25000003 PHARM REV CODE 250: Performed by: NURSE ANESTHETIST, CERTIFIED REGISTERED

## 2019-10-16 PROCEDURE — D9220A PRA ANESTHESIA: Mod: CRNA,,, | Performed by: NURSE ANESTHETIST, CERTIFIED REGISTERED

## 2019-10-16 PROCEDURE — 82962 GLUCOSE BLOOD TEST: CPT | Performed by: SURGERY

## 2019-10-16 PROCEDURE — D9220A PRA ANESTHESIA: ICD-10-PCS | Mod: CRNA,,, | Performed by: NURSE ANESTHETIST, CERTIFIED REGISTERED

## 2019-10-16 PROCEDURE — 63600175 PHARM REV CODE 636 W HCPCS: Performed by: ANESTHESIOLOGY

## 2019-10-16 PROCEDURE — 27201423 OPTIME MED/SURG SUP & DEVICES STERILE SUPPLY: Performed by: SURGERY

## 2019-10-16 PROCEDURE — 63600175 PHARM REV CODE 636 W HCPCS

## 2019-10-16 PROCEDURE — D9220A PRA ANESTHESIA: Mod: ANES,,, | Performed by: ANESTHESIOLOGY

## 2019-10-16 PROCEDURE — 43644 PR LAP GASTRIC BYPASS/ROUX-EN-Y: ICD-10-PCS | Mod: ,,, | Performed by: SURGERY

## 2019-10-16 PROCEDURE — 71000033 HC RECOVERY, INTIAL HOUR: Performed by: SURGERY

## 2019-10-16 PROCEDURE — 25000003 PHARM REV CODE 250: Performed by: STUDENT IN AN ORGANIZED HEALTH CARE EDUCATION/TRAINING PROGRAM

## 2019-10-16 PROCEDURE — 63600175 PHARM REV CODE 636 W HCPCS: Performed by: SURGERY

## 2019-10-16 PROCEDURE — 94761 N-INVAS EAR/PLS OXIMETRY MLT: CPT

## 2019-10-16 PROCEDURE — 11000001 HC ACUTE MED/SURG PRIVATE ROOM

## 2019-10-16 PROCEDURE — 36000711: Performed by: SURGERY

## 2019-10-16 RX ORDER — ONDANSETRON 2 MG/ML
INJECTION INTRAMUSCULAR; INTRAVENOUS
Status: DISCONTINUED | OUTPATIENT
Start: 2019-10-16 | End: 2019-10-16

## 2019-10-16 RX ORDER — HYDROCODONE BITARTRATE AND ACETAMINOPHEN 7.5; 325 MG/15ML; MG/15ML
15 SOLUTION ORAL EVERY 4 HOURS PRN
Status: DISCONTINUED | OUTPATIENT
Start: 2019-10-17 | End: 2019-10-17 | Stop reason: HOSPADM

## 2019-10-16 RX ORDER — BUPIVACAINE HYDROCHLORIDE 2.5 MG/ML
INJECTION, SOLUTION EPIDURAL; INFILTRATION; INTRACAUDAL
Status: DISCONTINUED | OUTPATIENT
Start: 2019-10-16 | End: 2019-10-16

## 2019-10-16 RX ORDER — SODIUM CHLORIDE 9 MG/ML
INJECTION, SOLUTION INTRAVENOUS CONTINUOUS
Status: DISCONTINUED | OUTPATIENT
Start: 2019-10-16 | End: 2019-10-17 | Stop reason: HOSPADM

## 2019-10-16 RX ORDER — MEPERIDINE HYDROCHLORIDE 50 MG/ML
12.5 INJECTION INTRAMUSCULAR; INTRAVENOUS; SUBCUTANEOUS EVERY 10 MIN PRN
Status: DISCONTINUED | OUTPATIENT
Start: 2019-10-16 | End: 2019-10-16 | Stop reason: HOSPADM

## 2019-10-16 RX ORDER — CEFAZOLIN SODIUM 1 G/3ML
INJECTION, POWDER, FOR SOLUTION INTRAMUSCULAR; INTRAVENOUS
Status: DISCONTINUED | OUTPATIENT
Start: 2019-10-16 | End: 2019-10-16

## 2019-10-16 RX ORDER — METOCLOPRAMIDE HYDROCHLORIDE 5 MG/ML
10 INJECTION INTRAMUSCULAR; INTRAVENOUS ONCE
Status: COMPLETED | OUTPATIENT
Start: 2019-10-16 | End: 2019-10-16

## 2019-10-16 RX ORDER — FAMOTIDINE 10 MG/ML
20 INJECTION INTRAVENOUS ONCE
Status: COMPLETED | OUTPATIENT
Start: 2019-10-16 | End: 2019-10-16

## 2019-10-16 RX ORDER — GABAPENTIN 300 MG/1
300 CAPSULE ORAL 3 TIMES DAILY
Status: DISCONTINUED | OUTPATIENT
Start: 2019-10-16 | End: 2019-10-16

## 2019-10-16 RX ORDER — INSULIN ASPART 100 [IU]/ML
0-5 INJECTION, SOLUTION INTRAVENOUS; SUBCUTANEOUS EVERY 6 HOURS PRN
Status: DISCONTINUED | OUTPATIENT
Start: 2019-10-16 | End: 2019-10-17 | Stop reason: HOSPADM

## 2019-10-16 RX ORDER — HYDROMORPHONE HCL IN 0.9% NACL 6 MG/30 ML
PATIENT CONTROLLED ANALGESIA SYRINGE INTRAVENOUS
Status: COMPLETED
Start: 2019-10-16 | End: 2019-10-16

## 2019-10-16 RX ORDER — HEPARIN SODIUM 5000 [USP'U]/ML
5000 INJECTION, SOLUTION INTRAVENOUS; SUBCUTANEOUS ONCE
Status: COMPLETED | OUTPATIENT
Start: 2019-10-16 | End: 2019-10-16

## 2019-10-16 RX ORDER — LIDOCAINE HCL/PF 100 MG/5ML
SYRINGE (ML) INTRAVENOUS
Status: DISCONTINUED | OUTPATIENT
Start: 2019-10-16 | End: 2019-10-16

## 2019-10-16 RX ORDER — MIDAZOLAM HYDROCHLORIDE 1 MG/ML
INJECTION, SOLUTION INTRAMUSCULAR; INTRAVENOUS
Status: DISCONTINUED | OUTPATIENT
Start: 2019-10-16 | End: 2019-10-16

## 2019-10-16 RX ORDER — GLYCOPYRROLATE 0.2 MG/ML
INJECTION INTRAMUSCULAR; INTRAVENOUS
Status: DISCONTINUED | OUTPATIENT
Start: 2019-10-16 | End: 2019-10-16

## 2019-10-16 RX ORDER — ACETAMINOPHEN 10 MG/ML
1000 INJECTION, SOLUTION INTRAVENOUS ONCE
Status: COMPLETED | OUTPATIENT
Start: 2019-10-16 | End: 2019-10-16

## 2019-10-16 RX ORDER — NALOXONE HCL 0.4 MG/ML
0.02 VIAL (ML) INJECTION
Status: DISCONTINUED | OUTPATIENT
Start: 2019-10-16 | End: 2019-10-17 | Stop reason: HOSPADM

## 2019-10-16 RX ORDER — FAMOTIDINE 10 MG/ML
20 INJECTION INTRAVENOUS 2 TIMES DAILY
Status: DISCONTINUED | OUTPATIENT
Start: 2019-10-16 | End: 2019-10-17 | Stop reason: HOSPADM

## 2019-10-16 RX ORDER — NEOSTIGMINE METHYLSULFATE 0.5 MG/ML
INJECTION, SOLUTION INTRAVENOUS
Status: DISCONTINUED | OUTPATIENT
Start: 2019-10-16 | End: 2019-10-16

## 2019-10-16 RX ORDER — PHENYLEPHRINE HYDROCHLORIDE 10 MG/ML
INJECTION INTRAVENOUS
Status: DISCONTINUED | OUTPATIENT
Start: 2019-10-16 | End: 2019-10-16

## 2019-10-16 RX ORDER — SODIUM CITRATE AND CITRIC ACID MONOHYDRATE 334; 500 MG/5ML; MG/5ML
15 SOLUTION ORAL ONCE
Status: COMPLETED | OUTPATIENT
Start: 2019-10-16 | End: 2019-10-16

## 2019-10-16 RX ORDER — ONDANSETRON 2 MG/ML
4 INJECTION INTRAMUSCULAR; INTRAVENOUS EVERY 6 HOURS PRN
Status: DISCONTINUED | OUTPATIENT
Start: 2019-10-16 | End: 2019-10-17 | Stop reason: HOSPADM

## 2019-10-16 RX ORDER — HYDROMORPHONE HCL IN 0.9% NACL 6 MG/30 ML
PATIENT CONTROLLED ANALGESIA SYRINGE INTRAVENOUS CONTINUOUS
Status: DISCONTINUED | OUTPATIENT
Start: 2019-10-16 | End: 2019-10-17

## 2019-10-16 RX ORDER — PROPOFOL 10 MG/ML
VIAL (ML) INTRAVENOUS
Status: DISCONTINUED | OUTPATIENT
Start: 2019-10-16 | End: 2019-10-16

## 2019-10-16 RX ORDER — SODIUM CHLORIDE 9 MG/ML
INJECTION, SOLUTION INTRAVENOUS CONTINUOUS PRN
Status: DISCONTINUED | OUTPATIENT
Start: 2019-10-16 | End: 2019-10-16

## 2019-10-16 RX ORDER — GLUCAGON 1 MG
1 KIT INJECTION
Status: DISCONTINUED | OUTPATIENT
Start: 2019-10-16 | End: 2019-10-17 | Stop reason: HOSPADM

## 2019-10-16 RX ORDER — ROCURONIUM BROMIDE 10 MG/ML
INJECTION, SOLUTION INTRAVENOUS
Status: DISCONTINUED | OUTPATIENT
Start: 2019-10-16 | End: 2019-10-16

## 2019-10-16 RX ORDER — FENTANYL CITRATE 50 UG/ML
INJECTION, SOLUTION INTRAMUSCULAR; INTRAVENOUS
Status: DISCONTINUED | OUTPATIENT
Start: 2019-10-16 | End: 2019-10-16

## 2019-10-16 RX ORDER — HYDROMORPHONE HYDROCHLORIDE 1 MG/ML
INJECTION, SOLUTION INTRAMUSCULAR; INTRAVENOUS; SUBCUTANEOUS
Status: COMPLETED
Start: 2019-10-16 | End: 2019-10-16

## 2019-10-16 RX ORDER — HYDROMORPHONE HYDROCHLORIDE 1 MG/ML
0.2 INJECTION, SOLUTION INTRAMUSCULAR; INTRAVENOUS; SUBCUTANEOUS EVERY 5 MIN PRN
Status: DISCONTINUED | OUTPATIENT
Start: 2019-10-16 | End: 2019-10-16 | Stop reason: HOSPADM

## 2019-10-16 RX ORDER — SODIUM CHLORIDE 0.9 % (FLUSH) 0.9 %
3 SYRINGE (ML) INJECTION
Status: DISCONTINUED | OUTPATIENT
Start: 2019-10-16 | End: 2019-10-17 | Stop reason: HOSPADM

## 2019-10-16 RX ORDER — ENOXAPARIN SODIUM 100 MG/ML
40 INJECTION SUBCUTANEOUS EVERY 12 HOURS
Status: DISCONTINUED | OUTPATIENT
Start: 2019-10-16 | End: 2019-10-17 | Stop reason: HOSPADM

## 2019-10-16 RX ADMIN — METOCLOPRAMIDE 10 MG: 5 INJECTION, SOLUTION INTRAMUSCULAR; INTRAVENOUS at 09:10

## 2019-10-16 RX ADMIN — ROCURONIUM BROMIDE 20 MG: 10 INJECTION, SOLUTION INTRAVENOUS at 11:10

## 2019-10-16 RX ADMIN — PROPOFOL 200 MG: 10 INJECTION, EMULSION INTRAVENOUS at 11:10

## 2019-10-16 RX ADMIN — GLYCOPYRROLATE 0.4 MG: 0.2 INJECTION, SOLUTION INTRAMUSCULAR; INTRAVENOUS at 01:10

## 2019-10-16 RX ADMIN — HYDROMORPHONE HYDROCHLORIDE 0.2 MG: 1 INJECTION, SOLUTION INTRAMUSCULAR; INTRAVENOUS; SUBCUTANEOUS at 02:10

## 2019-10-16 RX ADMIN — ROCURONIUM BROMIDE 40 MG: 10 INJECTION, SOLUTION INTRAVENOUS at 11:10

## 2019-10-16 RX ADMIN — FAMOTIDINE 20 MG: 10 INJECTION, SOLUTION INTRAVENOUS at 09:10

## 2019-10-16 RX ADMIN — SODIUM CITRATE AND CITRIC ACID MONOHYDRATE 15 ML: 500; 334 SOLUTION ORAL at 09:10

## 2019-10-16 RX ADMIN — ONDANSETRON 4 MG: 2 INJECTION INTRAMUSCULAR; INTRAVENOUS at 12:10

## 2019-10-16 RX ADMIN — SODIUM CHLORIDE: 0.9 INJECTION, SOLUTION INTRAVENOUS at 11:10

## 2019-10-16 RX ADMIN — Medication: at 02:10

## 2019-10-16 RX ADMIN — MIDAZOLAM HYDROCHLORIDE 2 MG: 1 INJECTION, SOLUTION INTRAMUSCULAR; INTRAVENOUS at 11:10

## 2019-10-16 RX ADMIN — LIDOCAINE HYDROCHLORIDE 100 MG: 20 INJECTION, SOLUTION INTRAVENOUS at 11:10

## 2019-10-16 RX ADMIN — CEFAZOLIN 3 G: 330 INJECTION, POWDER, FOR SOLUTION INTRAMUSCULAR; INTRAVENOUS at 11:10

## 2019-10-16 RX ADMIN — HEPARIN SODIUM 5000 UNITS: 5000 INJECTION, SOLUTION INTRAVENOUS; SUBCUTANEOUS at 09:10

## 2019-10-16 RX ADMIN — FENTANYL CITRATE 50 MCG: 50 INJECTION, SOLUTION INTRAMUSCULAR; INTRAVENOUS at 11:10

## 2019-10-16 RX ADMIN — FENTANYL CITRATE 50 MCG: 50 INJECTION, SOLUTION INTRAMUSCULAR; INTRAVENOUS at 01:10

## 2019-10-16 RX ADMIN — SODIUM CHLORIDE, SODIUM GLUCONATE, SODIUM ACETATE, POTASSIUM CHLORIDE, MAGNESIUM CHLORIDE, SODIUM PHOSPHATE, DIBASIC, AND POTASSIUM PHOSPHATE: .53; .5; .37; .037; .03; .012; .00082 INJECTION, SOLUTION INTRAVENOUS at 12:10

## 2019-10-16 RX ADMIN — SODIUM CHLORIDE: 0.9 INJECTION, SOLUTION INTRAVENOUS at 02:10

## 2019-10-16 RX ADMIN — PHENYLEPHRINE HYDROCHLORIDE 200 MCG: 10 INJECTION INTRAVENOUS at 12:10

## 2019-10-16 RX ADMIN — ENOXAPARIN SODIUM 40 MG: 100 INJECTION SUBCUTANEOUS at 08:10

## 2019-10-16 RX ADMIN — FENTANYL CITRATE 50 MCG: 50 INJECTION, SOLUTION INTRAMUSCULAR; INTRAVENOUS at 12:10

## 2019-10-16 RX ADMIN — ACETAMINOPHEN 1000 MG: 10 INJECTION, SOLUTION INTRAVENOUS at 02:10

## 2019-10-16 RX ADMIN — NEOSTIGMINE METHYLSULFATE 4 MG: 0.5 INJECTION INTRAVENOUS at 01:10

## 2019-10-16 RX ADMIN — FAMOTIDINE 20 MG: 10 INJECTION, SOLUTION INTRAVENOUS at 08:10

## 2019-10-16 NOTE — OP NOTE
DATE OF PROCEDURE: 10/16/2019    PRE OP DIAGNOSIS: Type 2 diabetes mellitus without complication, without long-term current use of insulin [E11.9]  Essential hypertension [I10]  COPD with chronic bronchitis [J44.9]  Low vitamin D level [R79.89]  Preop testing [Z01.818]  Morbid obesity [E66.01]  BMI 40.0-44.9, adult [Z68.41]    POST OP DIAGNOSIS: Type 2 diabetes mellitus without complication, without long-term current use of insulin [E11.9]  Essential hypertension [I10]  COPD with chronic bronchitis [J44.9]  Low vitamin D level [R79.89]  Preop testing [Z01.818]  Morbid obesity [E66.01]  BMI 40.0-44.9, adult [Z68.41]    PROCEDURE: Procedure(s) (LRB):  GASTROENTEROSTOMY, LAPAROSCOPIC, with intraop EGD (N/A)    Surgeon(s) and Role:     * Edmundo Ulrich MD - Primary     * Sergio Francis MD - Resident - AssistingANESTHESIA: General.     INDICATIONS: A 43 y.o. with  1. Morbid Obesity with Body mass index is 43.91 kg/m². and inability to lose weight.  2. Co-morbidities: Anxiety/depression, COPD, HTN, DM type 2 no complications  PROCEDURE IN DETAIL: The patient was placed under general anesthesia. The   abdomen was prepped and draped in the usual manner. Access to peritoneum was   gained 18 cm below the xyphoid using Optiview trocar under direct vision.   Pneumoperitoneum to 15 mmHg with CO2 gas was obtained. Four 5 mm trocars were   placed subcostally at midclavicular and anterior axial lines   bilaterally. A 10 mm trocar was placed one handbreadth caudad to the right   midclavicular trocar. The small bowel was divided 50 cm from ligament of   Treitz, 150 cm Almas limb was measured out. The limbs were attached with each   other with an Endo-BARBY. The remaining enterotomy was closed with Endo-BARBY.   Heel stitch was placed and a Brolin stitch was placed to close the mesenteric   Defect.  A liver retractor was   placed. The lesser sac was incised with a Harmonic scalpel. This was taken to   a point lesser curve  approximately 5 cm from the lower esophageal sphincter.   The stomach was divided from this point to the angle of His using Endo-BARBY. The   Almas limb was attached proximal stomach pouch with a running Endostitch and   enterotomy made in each limb. An 11 mm gastrojejunostomy made with Endo-BARBY.   The endoscope was placed across the enterostomy. The remaining enterotomy was   closed with a running Endostitch using endoscope as a sizing stent.  A second   layer of running Endostitch was used to complete the anastomosis. A bowel clamp   was placed and air insufflated through   the endoscope to check for air leaks. No air leaks were seen. The anastomosis   and stomach appeared of appropriate size and configuration. Air was aspirated from   the endoscope and the endoscope was withdrawn. The fluid was removed from the   upper abdomen. The abdomen was inspected for hemostasis. The liver retractor was removed. Trocars removed under   direct vision. Prior to removing the last trocar, the pneumoperitoneum was   allowed to escape. The fascia of the primary trochar site was not closed with 0 vicryl. The skin was infiltrated with Marcaine solution, closed with   4-0 plain catgut, and reinforced with Mastisol, Steri-Strips, and Band-Aids.   The patient tolerated procedure well and was brought to Recovery Room in stable   condition. Sponge and needle counts were correct at the end of the case.    Blood loss was min, complications were none, consent was obtained and pathology was none

## 2019-10-16 NOTE — ANESTHESIA PREPROCEDURE EVALUATION
10/16/2019  Leora Steward is a 43 y.o., female.      Procedure: GASTROENTEROSTOMY, LAPAROSCOPIC, with intraop EGD (N/A Abdomen)   Anesthesia type: General   Diagnosis:       Type 2 diabetes mellitus without complication, without long-term current use of insulin [E11.9]      Essential hypertension [I10]      COPD with chronic bronchitis [J44.9]      Low vitamin D level [R79.89]      Preop testing [Z01.818]      Morbid obesity [E66.01]      BMI 40.0-44.9, adult [Z68.41]         Pre-operative evaluation for Procedure(s) (LRB):  GASTROENTEROSTOMY, LAPAROSCOPIC, with intraop EGD (N/A)    Encounter Diagnosis   Name Primary?    Morbid obesity        Review of patient's allergies indicates:  No Known Allergies    No current facility-administered medications on file prior to encounter.      Current Outpatient Medications on File Prior to Encounter   Medication Sig Dispense Refill    atorvastatin (LIPITOR) 20 MG tablet       lamoTRIgine (LAMICTAL) 25 MG tablet Take 25 mg by mouth 2 (two) times daily.       metFORMIN (GLUCOPHAGE) 1000 MG tablet Take 1,000 mg by mouth 2 (two) times daily with meals.      repaglinide (PRANDIN) 1 MG tablet Take 1 tablet (1 mg total) by mouth 3 (three) times daily before meals. 270 tablet 0    semaglutide (OZEMPIC) 1 mg/dose (2 mg/1.5 mL) PnIj Inject 1 mg subq weekly 3 mL 3    venlafaxine (EFFEXOR-XR) 150 MG Cp24 Take 150 mg by mouth once daily.       budesonide-formoterol 160-4.5 mcg (SYMBICORT) 160-4.5 mcg/actuation HFAA Inhale 2 puffs into the lungs daily as needed. Controller      butalbital-aspirin-caffeine -40 mg (FIORINAL) -40 mg Cap Take 2 capsules by mouth every 4 (four) hours as needed.      fenoprofen (NAPROFEN) 600 mg Tab Take 1 tablet by mouth 3 (three) times daily as needed.      gabapentin (NEURONTIN) 300 MG capsule Take 300 mg by mouth 3 (three) times  daily.      lisinopril-hydrochlorothiazide (PRINZIDE,ZESTORETIC) 20-12.5 mg per tablet Take 1 tablet by mouth once daily.      ONETOUCH ULTRA BLUE TEST STRIP Strp       tiZANidine (ZANAFLEX) 4 MG tablet Take 4 mg by mouth every 6 (six) hours as needed.         Social History     Tobacco Use   Smoking Status Former Smoker    Last attempt to quit:     Years since quittin.7   Smokeless Tobacco Never Used       Social History     Substance and Sexual Activity   Alcohol Use Not Currently    Frequency: Never       Patient Active Problem List   Diagnosis    Type 2 diabetes mellitus without complication, without long-term current use of insulin    Essential hypertension    Arthritis of neck    Anxiety    Depression    Chronic bronchitis    Morbid obesity with BMI of 40.0-44.9, adult    Morbid obesity       Past Surgical History:   Procedure Laterality Date    CHOLECYSTECTOMY      HYSTERECTOMY      full    TUBAL LIGATION             No results for input(s): HCT in the last 72 hours.  No results for input(s): PLT in the last 72 hours.  No results for input(s): K in the last 72 hours.  No results for input(s): CREATININE in the last 72 hours.  No results for input(s): GLU in the last 72 hours.  No results for input(s): PT in the last 72 hours.                    Anesthesia Evaluation         Review of Systems  Anesthesia Hx:  No problems with previous Anesthesia    Hematology/Oncology:  Hematology Normal   Oncology Normal     Cardiovascular:   Denies Hypertension. (well controlled off meds now)  Denies MI.    Denies Angina. eliptical one hour frequently without difficulty   Pulmonary:  Pulmonary Normal  Denies COPD. (specifically denies.)  Denies Asthma.  Denies Shortness of breath.    Neurological:   Denies TIA. Denies CVA. Denies Seizures.    Endocrine:   Diabetes, type 2        Physical Exam  General:  Well nourished    Airway/Jaw/Neck:  Airway Findings: Mouth Opening: Normal Tongue: Normal  General  Airway Assessment: Adult, Average  Mallampati: II  TM Distance: Normal, at least 6 cm  Jaw/Neck Findings:  Neck ROM: Normal ROM            Mental Status:  Mental Status Findings:  Cooperative, Alert and Oriented         Anesthesia Plan  Type of Anesthesia, risks & benefits discussed:  Anesthesia Type:  general  Patient's Preference:   Intra-op Monitoring Plan:   Intra-op Monitoring Plan Comments:   Post Op Pain Control Plan:   Post Op Pain Control Plan Comments: As per surgeon's plan  Induction:   IV  Beta Blocker:  Patient is not currently on a Beta-Blocker (No further documentation required).       Informed Consent: Patient understands risks and agrees with Anesthesia plan.  Questions answered. Anesthesia consent signed with patient.  ASA Score: 3     Day of Surgery Review of History & Physical:    H&P update referred to the surgeon.         Ready For Surgery From Anesthesia Perspective.

## 2019-10-16 NOTE — BRIEF OP NOTE
Operative Note       Surgery Date: 10/16/2019     Surgeon(s) and Role:     * Edmundo Ulrich MD - Primary     * Sergio Francis MD - Resident - Assisting    Pre-op Diagnosis:  Type 2 diabetes mellitus without complication, without long-term current use of insulin [E11.9]  Essential hypertension [I10]  COPD with chronic bronchitis [J44.9]  Low vitamin D level [R79.89]  Preop testing [Z01.818]  Morbid obesity [E66.01]  BMI 40.0-44.9, adult [Z68.41]    Post-op Diagnosis:  Type 2 diabetes mellitus without complication, without long-term current use of insulin [E11.9]  Essential hypertension [I10]  COPD with chronic bronchitis [J44.9]  Low vitamin D level [R79.89]  Preop testing [Z01.818]  Morbid obesity [E66.01]  BMI 40.0-44.9, adult [Z68.41]    Procedure(s) (LRB):  GASTROENTEROSTOMY, LAPAROSCOPIC, with intraop EGD (N/A)    Anesthesia: General    Procedure in Detail/Findings:  lrgby without apparent complication    Estimated Blood Loss: Minimal           Specimens (From admission, onward)    None        Implants: * No implants in log *           Disposition: PACU - hemodynamically stable.           Condition: Good    Attestation:  I was present and scrubbed for the entire procedure.

## 2019-10-16 NOTE — BRIEF OP NOTE
[facility]  Brief Operative Note    SUMMARY     Surgery Date: 10/16/2019     Surgeon(s) and Role:     * Edmundo Ulrich MD - Primary     * Sergio Francis MD - Resident - Assisting        Pre-op Diagnosis:  Type 2 diabetes mellitus without complication, without long-term current use of insulin [E11.9]  Essential hypertension [I10]  COPD with chronic bronchitis [J44.9]  Low vitamin D level [R79.89]  Preop testing [Z01.818]  Morbid obesity [E66.01]  BMI 40.0-44.9, adult [Z68.41]    Post-op Diagnosis:  Post-Op Diagnosis Codes:     * Type 2 diabetes mellitus without complication, without long-term current use of insulin [E11.9]     * Essential hypertension [I10]     * COPD with chronic bronchitis [J44.9]     * Low vitamin D level [R79.89]     * Preop testing [Z01.818]     * Morbid obesity [E66.01]     * BMI 40.0-44.9, adult [Z68.41]    Procedure(s) (LRB):  GASTROENTEROSTOMY, LAPAROSCOPIC, with intraop EGD (N/A)    Anesthesia: General    Description of Procedure: Almas en Y gastric bypass.  BP limb is 50cm, J-J at 150cm.  Negative leak on intra-op EGD    Description of the findings of the procedure: As above    Estimated Blood Loss: minimal         Specimens:   Specimen (12h ago, onward)    None

## 2019-10-16 NOTE — TRANSFER OF CARE
"Anesthesia Transfer of Care Note    Patient: Leora Steward    Procedure(s) Performed: Procedure(s) (LRB):  GASTROENTEROSTOMY, LAPAROSCOPIC, with intraop EGD (N/A)    Patient location: PACU    Anesthesia Type: general    Transport from OR: Transported from OR on room air with adequate spontaneous ventilation    Post pain: adequate analgesia    Post assessment: no apparent anesthetic complications and tolerated procedure well    Post vital signs: stable    Level of consciousness: awake and alert    Nausea/Vomiting: no nausea/vomiting    Complications: none    Transfer of care protocol was followed      Last vitals:   Visit Vitals  BP (!) 157/103 (BP Location: Right arm, Patient Position: Lying)   Pulse 63   Temp 36.3 °C (97.3 °F) (Temporal)   Resp 14   Ht 5' 3" (1.6 m)   Wt 97.5 kg (214 lb 15.2 oz)   SpO2 98%   Breastfeeding? No   BMI 38.08 kg/m²     "

## 2019-10-16 NOTE — ANESTHESIA RELEASE NOTE
"Anesthesia Release from PACU Note    Patient: Leora Steward    Procedure(s) Performed: Procedure(s) (LRB):  GASTROENTEROSTOMY, LAPAROSCOPIC, with intraop EGD (N/A)    Anesthesia type: GEN    Post pain: having some pain, but is sleelpy, titrating pain meds as state of arousal allows    Post assessment: no apparent anesthetic complications, tolerated procedure well and no evidence of recall    Post vital signs: BP (!) 157/103 (BP Location: Right arm, Patient Position: Lying)   Pulse 63   Temp 36.3 °C (97.3 °F) (Temporal)   Resp 14   Ht 5' 3" (1.6 m)   Wt 97.5 kg (214 lb 15.2 oz)   SpO2 98%   Breastfeeding? No   BMI 38.08 kg/m²     Level of consciousness: awake, alert and oriented    Nausea/Vomiting: no nausea/no vomiting    Complications: none    Airway Patency: patent    Respiratory: unassisted, spontaneous ventilation, room air    Cardiovascular: stable and blood pressure at baseline    Hydration: euvolemic    "

## 2019-10-16 NOTE — NURSING TRANSFER
Nursing Transfer Note      10/16/2019     Transfer To: 545[address]A    Transfer via bed    Transfer with room air    Transported by pct x 2    Medicines sent: PCA- dilaudid    Chart send with patient: Yes    Notified: spouse

## 2019-10-16 NOTE — ANESTHESIA POSTPROCEDURE EVALUATION
Anesthesia Post Evaluation    Patient: Leora Steward    Procedure(s) Performed: Procedure(s) (LRB):  GASTROENTEROSTOMY, LAPAROSCOPIC, with intraop EGD (N/A)    Final Anesthesia Type: general  Patient location during evaluation: PACU  Patient participation: Yes- Able to Participate  Level of consciousness: awake and alert and oriented  Post-procedure vital signs: reviewed and stable  Pain management: pain needs to be addressed (having some pain, but is sleelpy, titrating pain meds as state of arousal allows)  Airway patency: patent  PONV status at discharge: No PONV  Anesthetic complications: no      Cardiovascular status: stable  Respiratory status: unassisted, spontaneous ventilation and room air  Hydration status: euvolemic  Follow-up not needed.          Vitals Value Taken Time   /103 10/16/2019  2:04 PM   Temp 36.3 °C (97.3 °F) 10/16/2019  2:02 PM   Pulse 64 10/16/2019  2:13 PM   Resp 18 10/16/2019  2:13 PM   SpO2 98 % 10/16/2019  2:13 PM   Vitals shown include unvalidated device data.      No case tracking events are documented in the log.      Pain/Alin Score: Pain Rating Prior to Med Admin: 10 (10/16/2019  2:10 PM)

## 2019-10-17 VITALS
HEART RATE: 91 BPM | WEIGHT: 214.94 LBS | HEIGHT: 63 IN | SYSTOLIC BLOOD PRESSURE: 122 MMHG | OXYGEN SATURATION: 95 % | TEMPERATURE: 100 F | BODY MASS INDEX: 38.09 KG/M2 | DIASTOLIC BLOOD PRESSURE: 61 MMHG | RESPIRATION RATE: 18 BRPM

## 2019-10-17 LAB
ANION GAP SERPL CALC-SCNC: 12 MMOL/L (ref 8–16)
BASOPHILS # BLD AUTO: 0.01 K/UL (ref 0–0.2)
BASOPHILS NFR BLD: 0.1 % (ref 0–1.9)
BUN SERPL-MCNC: 3 MG/DL (ref 6–20)
CALCIUM SERPL-MCNC: 8.4 MG/DL (ref 8.7–10.5)
CHLORIDE SERPL-SCNC: 111 MMOL/L (ref 95–110)
CO2 SERPL-SCNC: 16 MMOL/L (ref 23–29)
CREAT SERPL-MCNC: 0.6 MG/DL (ref 0.5–1.4)
DIFFERENTIAL METHOD: ABNORMAL
EOSINOPHIL # BLD AUTO: 0 K/UL (ref 0–0.5)
EOSINOPHIL NFR BLD: 0.2 % (ref 0–8)
ERYTHROCYTE [DISTWIDTH] IN BLOOD BY AUTOMATED COUNT: 13.4 % (ref 11.5–14.5)
EST. GFR  (AFRICAN AMERICAN): >60 ML/MIN/1.73 M^2
EST. GFR  (NON AFRICAN AMERICAN): >60 ML/MIN/1.73 M^2
GLUCOSE SERPL-MCNC: 95 MG/DL (ref 70–110)
HCT VFR BLD AUTO: 40.3 % (ref 37–48.5)
HGB BLD-MCNC: 12.3 G/DL (ref 12–16)
IMM GRANULOCYTES # BLD AUTO: 0.04 K/UL (ref 0–0.04)
IMM GRANULOCYTES NFR BLD AUTO: 0.3 % (ref 0–0.5)
LYMPHOCYTES # BLD AUTO: 1.6 K/UL (ref 1–4.8)
LYMPHOCYTES NFR BLD: 13 % (ref 18–48)
MAGNESIUM SERPL-MCNC: 1.7 MG/DL (ref 1.6–2.6)
MCH RBC QN AUTO: 28.5 PG (ref 27–31)
MCHC RBC AUTO-ENTMCNC: 30.5 G/DL (ref 32–36)
MCV RBC AUTO: 94 FL (ref 82–98)
MONOCYTES # BLD AUTO: 0.9 K/UL (ref 0.3–1)
MONOCYTES NFR BLD: 7 % (ref 4–15)
NEUTROPHILS # BLD AUTO: 9.7 K/UL (ref 1.8–7.7)
NEUTROPHILS NFR BLD: 79.4 % (ref 38–73)
NRBC BLD-RTO: 0 /100 WBC
PHOSPHATE SERPL-MCNC: 2.2 MG/DL (ref 2.7–4.5)
PLATELET # BLD AUTO: 306 K/UL (ref 150–350)
PMV BLD AUTO: 10.1 FL (ref 9.2–12.9)
POTASSIUM SERPL-SCNC: 4.5 MMOL/L (ref 3.5–5.1)
RBC # BLD AUTO: 4.31 M/UL (ref 4–5.4)
SODIUM SERPL-SCNC: 139 MMOL/L (ref 136–145)
WBC # BLD AUTO: 12.23 K/UL (ref 3.9–12.7)

## 2019-10-17 PROCEDURE — 83735 ASSAY OF MAGNESIUM: CPT

## 2019-10-17 PROCEDURE — 84100 ASSAY OF PHOSPHORUS: CPT

## 2019-10-17 PROCEDURE — 63600175 PHARM REV CODE 636 W HCPCS: Performed by: STUDENT IN AN ORGANIZED HEALTH CARE EDUCATION/TRAINING PROGRAM

## 2019-10-17 PROCEDURE — 25000003 PHARM REV CODE 250: Performed by: STUDENT IN AN ORGANIZED HEALTH CARE EDUCATION/TRAINING PROGRAM

## 2019-10-17 PROCEDURE — 36415 COLL VENOUS BLD VENIPUNCTURE: CPT

## 2019-10-17 PROCEDURE — 99900035 HC TECH TIME PER 15 MIN (STAT)

## 2019-10-17 PROCEDURE — 80048 BASIC METABOLIC PNL TOTAL CA: CPT

## 2019-10-17 PROCEDURE — 94770 HC EXHALED C02 TEST: CPT

## 2019-10-17 PROCEDURE — S0028 INJECTION, FAMOTIDINE, 20 MG: HCPCS | Performed by: STUDENT IN AN ORGANIZED HEALTH CARE EDUCATION/TRAINING PROGRAM

## 2019-10-17 PROCEDURE — 85025 COMPLETE CBC W/AUTO DIFF WBC: CPT

## 2019-10-17 RX ORDER — VENLAFAXINE HYDROCHLORIDE 75 MG/1
150 CAPSULE, EXTENDED RELEASE ORAL DAILY
Status: DISCONTINUED | OUTPATIENT
Start: 2019-10-17 | End: 2019-10-17 | Stop reason: HOSPADM

## 2019-10-17 RX ADMIN — FAMOTIDINE 20 MG: 10 INJECTION, SOLUTION INTRAVENOUS at 09:10

## 2019-10-17 RX ADMIN — ONDANSETRON 4 MG: 2 INJECTION INTRAMUSCULAR; INTRAVENOUS at 09:10

## 2019-10-17 RX ADMIN — Medication: at 12:10

## 2019-10-17 RX ADMIN — VENLAFAXINE HYDROCHLORIDE 150 MG: 75 CAPSULE, EXTENDED RELEASE ORAL at 09:10

## 2019-10-17 RX ADMIN — ONDANSETRON 4 MG: 2 INJECTION INTRAMUSCULAR; INTRAVENOUS at 01:10

## 2019-10-17 RX ADMIN — HYDROCODONE BITARTRATE AND ACETAMINOPHEN 15 ML: 7.5; 325 SOLUTION ORAL at 02:10

## 2019-10-17 RX ADMIN — ENOXAPARIN SODIUM 40 MG: 100 INJECTION SUBCUTANEOUS at 09:10

## 2019-10-17 NOTE — DISCHARGE SUMMARY
[facility]  General Surgery  Discharge Summary      Patient Name: Leora Middleton  MRN: [id number]  Admission Date: 10/16/2019  Hospital Length of Stay: 1 days  Discharge Date and Time:  10/17/2019 2:10 PM  Attending Physician: Edmundo Ulrich MD   Discharging Provider: Chon Cody MD  Primary Care Provider: Camacho Augustine MD    HPI:   Leora Middleton is a 43 y.o. female who presents for pre-operative exam for weight loss surgery.  she has completed her pre-operative evaluation.  she has failed medical treatment for obesity.  1. Morbid Obesity with Body mass index is 43.91 kg/m². and inability to lose weight.  2. Co-morbidities: Anxiety/depression, COPD, HTN, DM type 2 no complications    Procedure(s) (LRB):  GASTROENTEROSTOMY, LAPAROSCOPIC, with intraop EGD (N/A)      Indwelling Lines/Drains at time of discharge:   Lines/Drains/Airways     None               Hospital Course: VERONIKA MIDDLETON 43 y.o.female underwent: Procedure(s) (LRB):  GASTROENTEROSTOMY, LAPAROSCOPIC, with intraop EGD (N/A). The patient tolerated the procedure well, was transferred to recovery post-op, and then transferred to the floor for continuation of medical care. The patient's clinical condition progressively improved. By the time of discharge, she was tolerating a diet without nausea or vomiting, pain was well controlled with oral medications, and she was ambulating without difficulty. On POD 1 the patient was discharged to home. On discharge, the patient's incisions were c/d/i and the surgical site was soft and appropriately tender to palpation. The patient will follow up in outpatient clinic in 2 weeks.        Significant Diagnostic Studies: Labs: BMP: No results for input(s): GLU, NA, K, CL, CO2, BUN, CREATININE, CALCIUM, MG in the last 48 hours. and CBC No results for input(s): WBC, HGB, HCT, PLT in the last 48 hours.    Pending Diagnostic Studies:     Procedure Component Value Units Date/Time    Basic  metabolic panel [[id number]]     Order Status:  Sent Lab Status:  No result     Specimen:  Blood     CBC auto differential [[id number]]     Order Status:  Sent Lab Status:  No result     Specimen:  Blood     Magnesium [[id number]]     Order Status:  Sent Lab Status:  No result     Specimen:  Blood     Phosphorus [[id number]]     Order Status:  Sent Lab Status:  No result     Specimen:  Blood         Final Active Diagnoses:    Diagnosis Date Noted POA    PRINCIPAL PROBLEM:  Morbid obesity [E66.01] 10/16/2019 Yes      Problems Resolved During this Admission:      Discharged Condition: good    Disposition: Home or Self Care    Follow Up:  Follow-up Information     Edmundo Ulrich MD In 2 weeks.    Specialties:  General Surgery, Bariatrics  Contact information[facility]  [facility] [address]  [phone]                 Patient Instructions:      Diet clear liquid     Lifting restrictions   Order Comments: Please do not lift great than 10 pounds for 6 weeks.     No driving until:   Order Comments: Do not drive while taking narcotics.     Notify your health care provider if you experience any of the following:  increased confusion or weakness     Notify your health care provider if you experience any of the following:  persistent dizziness, light-headedness, or visual disturbances     Notify your health care provider if you experience any of the following:  severe persistent headache     Notify your health care provider if you experience any of the following:  difficulty breathing or increased cough     Notify your health care provider if you experience any of the following:  redness, tenderness, or signs of infection (pain, swelling, redness, odor or green/yellow discharge around incision site)     Notify your health care provider if you experience any of the following:  severe uncontrolled pain     Notify your health care provider if you experience any of the following:  persistent nausea and vomiting or  diarrhea     Notify your health care provider if you experience any of the following:  temperature >100.4     Activity as tolerated   Order Comments: Light activity only.     Medications:  Reconciled Home Medications:      Medication List      CONTINUE taking these medications    atorvastatin 20 MG tablet  Commonly known as:  LIPITOR     b complex vitamins tablet  Take 1 tablet by mouth once daily. Vitamin B 12 500 mcg sublingual     butalbital-aspirin-caffeine -40 mg -40 mg Cap  Commonly known as:  FIORINAL  Take 2 capsules by mouth every 4 (four) hours as needed.     CALCIUM CITRATE ORAL  Take 1 tablet by mouth 3 (three) times daily.     fenoprofen 600 mg Tab  Commonly known as:  NAPROFEN  Take 1 tablet by mouth 3 (three) times daily as needed.     gabapentin 300 MG capsule  Commonly known as:  NEURONTIN  Take 300 mg by mouth 3 (three) times daily.     hydrocodone-apap 7.5-325 MG/15 ML oral solution  Commonly known as:  HYCET  Take 15 mLs by mouth 4 (four) times daily as needed for Pain.     lamoTRIgine 25 MG tablet  Commonly known as:  LAMICTAL  Take 25 mg by mouth 2 (two) times daily.     metFORMIN 1000 MG tablet  Commonly known as:  GLUCOPHAGE  Take 1,000 mg by mouth 2 (two) times daily with meals.     omeprazole 40 MG capsule  Commonly known as:  PRILOSEC  Take 1 capsule (40 mg total) by mouth every morning. Open capsule and take with apple sauce     ondansetron 8 MG Tbdl  Commonly known as:  ZOFRAN-ODT  Dissolve 1 tablet (8 mg total) by mouth every 6 (six) hours as needed.     ONE DAILY MULTIVITAMIN per tablet  Generic drug:  multivitamin  Take 1 tablet by mouth 2 (two) times daily.     ONETOUCH ULTRA BLUE TEST STRIP Strp  Generic drug:  blood sugar diagnostic     polyethylene glycol 17 gram/dose powder  Commonly known as:  GLYCOLAX  Mix 1 capful (17 g) with fluids and take by mouth once daily.     repaglinide 1 MG tablet  Commonly known as:  PRANDIN  Take 1 tablet (1 mg total) by mouth 3 (three)  times daily before meals.     semaglutide 1 mg/dose (2 mg/1.5 mL) Pnij  Commonly known as:  OZEMPIC  Inject 1 mg subq weekly     venlafaxine 150 MG Cp24  Commonly known as:  EFFEXOR-XR  Take 150 mg by mouth once daily.     VITAMIN B-1 50 MG tablet  Generic drug:  thiamine  Take 50 mg by mouth once daily.        ASK your doctor about these medications    budesonide-formoterol 160-4.5 mcg 160-4.5 mcg/actuation Hfaa  Commonly known as:  SYMBICORT  Inhale 2 puffs into the lungs daily as needed. Controller     lisinopril-hydrochlorothiazide 20-12.5 mg per tablet  Commonly known as:  PRINZIDE,ZESTORETIC  Take 1 tablet by mouth once daily.     tiZANidine 4 MG tablet  Commonly known as:  ZANAFLEX  Take 4 mg by mouth every 6 (six) hours as needed.          Time spent on the discharge of patient: 20 minutes    Chon Cody MD  General Surgery  [facility]

## 2019-10-17 NOTE — HPI
Leora Steward is a 43 y.o. female who presents for pre-operative exam for weight loss surgery.  she has completed her pre-operative evaluation.  she has failed medical treatment for obesity.  1. Morbid Obesity with Body mass index is 43.91 kg/m². and inability to lose weight.  2. Co-morbidities: Anxiety/depression, COPD, HTN, DM type 2 no complications

## 2019-10-17 NOTE — ASSESSMENT & PLAN NOTE
42 yo female now s/p RNYGB on 10/16/2019.    - Diet: advance to CLD  - dc PCA, start hycet  - ambulate, IS, lovenox, pepcid  - mIVF @ 60    -Dispo: possible discharge to home today if tolerating diet

## 2019-10-17 NOTE — PLAN OF CARE
10/17/19 1311   Post-Acute Status   Post-Acute Authorization Other   Other Status No Post-Acute Service Needs   This SW in communication with  and medical team. SW will continue to follow for discharge needs and offer support as needed.    No SW needs identified at this time.    Cait Barraza LMSW  [facility]  [id number]

## 2019-10-17 NOTE — NURSING
Pt tolerating CLD and pain medication okay. Discharge instructions given to patient. Verbalizes understanding. IV removed, catheter intact. No complaints/concerns voiced at this time.

## 2019-10-17 NOTE — HOSPITAL COURSE
VERONIKA MIDDLETON 43 y.o.female underwent: Procedure(s) (LRB):  GASTROENTEROSTOMY, LAPAROSCOPIC, with intraop EGD (N/A). The patient tolerated the procedure well, was transferred to recovery post-op, and then transferred to the floor for continuation of medical care. The patient's clinical condition progressively improved. By the time of discharge, she was tolerating a diet without nausea or vomiting, pain was well controlled with oral medications, and she was ambulating without difficulty. On POD 1 the patient was discharged to home. On discharge, the patient's incisions were c/d/i and the surgical site was soft and appropriately tender to palpation. The patient will follow up in outpatient clinic in 2 weeks.

## 2019-10-17 NOTE — SUBJECTIVE & OBJECTIVE
Interval History: NAEON. Patient with some nausea, vomit x1 small volume. Ambulating without difficulty. mIVF running. UOP adequate.    Medications:  Continuous Infusions:   sodium chloride 0.9% 125 mL/hr at 10/16/19 1423     Scheduled Meds:   enoxparin  40 mg Subcutaneous Q12H    famotidine (PF)  20 mg Intravenous BID     PRN Meds:Dextrose 10% Bolus, glucagon (human recombinant), hydrocodone-apap 7.5-325 MG/15 ML, insulin aspart U-100, naloxone, ondansetron, promethazine (PHENERGAN) IVPB, sodium chloride 0.9%     Review of patient's allergies indicates:  No Known Allergies  Objective:     Vital Signs (Most Recent):  Temp: 98.5 °F (36.9 °C) (10/17/19 0524)  Pulse: 92 (10/17/19 0524)  Resp: 18 (10/17/19 0524)  BP: (!) 142/67 (10/17/19 0524)  SpO2: 99 % (10/17/19 0524) Vital Signs (24h Range):  Temp:  [96.6 °F (35.9 °C)-98.5 °F (36.9 °C)] 98.5 °F (36.9 °C)  Pulse:  [63-92] 92  Resp:  [14-18] 18  SpO2:  [92 %-99 %] 99 %  BP: (108-157)/() 142/67     Weight: 97.5 kg (214 lb 15.2 oz)  Body mass index is 38.08 kg/m².    Intake/Output - Last 3 Shifts       10/15 0700 - 10/16 0659 10/16 0700 - 10/17 0659 10/17 0700 - 10/18 0659    I.V. (mL/kg)  1000 (10.3)     Total Intake(mL/kg)  1000 (10.3)     Net  +1000                  Physical Exam   Constitutional: She is oriented to person, place, and time. She appears well-developed and well-nourished. No distress.   Cardiovascular: Normal rate and intact distal pulses.   Pulmonary/Chest: Effort normal. No respiratory distress.   Abdominal: Soft. She exhibits no distension.   Incisions c/d/i.   Musculoskeletal: She exhibits no edema or tenderness.   Neurological: She is alert and oriented to person, place, and time.   Skin: Skin is warm and dry. No rash noted.   Nursing note and vitals reviewed.      Significant Labs:  CBC: No results for input(s): WBC, RBC, HGB, HCT, PLT, MCV, MCH, MCHC in the last 168 hours.  CMP: No results for input(s): GLU, CALCIUM, ALBUMIN, PROT, NA,  K, CO2, CL, BUN, CREATININE, ALKPHOS, ALT, AST, BILITOT in the last 168 hours.    Significant Diagnostics:  None

## 2019-10-17 NOTE — PROGRESS NOTES
[facility]  General Surgery  Progress Note    Subjective:     History of Present Illness:  Leora Steward is a 43 y.o. female who presents for pre-operative exam for weight loss surgery.  she has completed her pre-operative evaluation.  she has failed medical treatment for obesity.  1. Morbid Obesity with Body mass index is 43.91 kg/m². and inability to lose weight.  2. Co-morbidities: Anxiety/depression, COPD, HTN, DM type 2 no complications    Post-Op Info:  Procedure(s) (LRB):  GASTROENTEROSTOMY, LAPAROSCOPIC, with intraop EGD (N/A)   1 Day Post-Op     Interval History: NAEON. Patient with some nausea, vomit x1 small volume. Ambulating without difficulty. mIVF running. UOP adequate.    Medications:  Continuous Infusions:   sodium chloride 0.9% 125 mL/hr at 10/16/19 1423     Scheduled Meds:   enoxparin  40 mg Subcutaneous Q12H    famotidine (PF)  20 mg Intravenous BID     PRN Meds:Dextrose 10% Bolus, glucagon (human recombinant), hydrocodone-apap 7.5-325 MG/15 ML, insulin aspart U-100, naloxone, ondansetron, promethazine (PHENERGAN) IVPB, sodium chloride 0.9%     Review of patient's allergies indicates:  No Known Allergies  Objective:     Vital Signs (Most Recent):  Temp: 98.5 °F (36.9 °C) (10/17/19 0524)  Pulse: 92 (10/17/19 0524)  Resp: 18 (10/17/19 0524)  BP: (!) 142/67 (10/17/19 0524)  SpO2: 99 % (10/17/19 0524) Vital Signs (24h Range):  Temp:  [96.6 °F (35.9 °C)-98.5 °F (36.9 °C)] 98.5 °F (36.9 °C)  Pulse:  [63-92] 92  Resp:  [14-18] 18  SpO2:  [92 %-99 %] 99 %  BP: (108-157)/() 142/67     Weight: 97.5 kg (214 lb 15.2 oz)  Body mass index is 38.08 kg/m².    Intake/Output - Last 3 Shifts       10/15 0700 - 10/16 0659 10/16 0700 - 10/17 0659 10/17 0700 - 10/18 0659    I.V. (mL/kg)  1000 (10.3)     Total Intake(mL/kg)  1000 (10.3)     Net  +1000                  Physical Exam   Constitutional: She is oriented to person, place, and time. She appears well-developed and well-nourished.  No distress.   Cardiovascular: Normal rate and intact distal pulses.   Pulmonary/Chest: Effort normal. No respiratory distress.   Abdominal: Soft. She exhibits no distension.   Incisions c/d/i.   Musculoskeletal: She exhibits no edema or tenderness.   Neurological: She is alert and oriented to person, place, and time.   Skin: Skin is warm and dry. No rash noted.   Nursing note and vitals reviewed.      Significant Labs:  CBC: No results for input(s): WBC, RBC, HGB, HCT, PLT, MCV, MCH, MCHC in the last 168 hours.  CMP: No results for input(s): GLU, CALCIUM, ALBUMIN, PROT, NA, K, CO2, CL, BUN, CREATININE, ALKPHOS, ALT, AST, BILITOT in the last 168 hours.    Significant Diagnostics:  None    Assessment/Plan:     * Morbid obesity  42 yo female now s/p RNYGB on 10/16/2019.    - Diet: advance to CLD  - dc PCA, start hycet  - ambulate, IS, lovenox, pepcid  - mIVF @ 60    -Dispo: possible discharge to home today if tolerating diet        Chon Cody MD  General Surgery  [facility]

## 2019-10-17 NOTE — PLAN OF CARE
Patient lives in a 1 story house w/spouse. Spouse is at her BS. Patient is independent & agile, prior to admit. Spouse states he took off of work for 2 weeks to help her at home. No needs are determined.   [facility] have already been delivered.     [facility] given to patient after informed about it;patient verbalized their understanding.        10/17/19 1139   Discharge Assessment   Assessment Type Discharge Planning Assessment   Confirmed/corrected address and phone number on facesheet? Yes   Assessment information obtained from? Patient;Medical Record   Expected Length of Stay (days)   (1-2)   Communicated expected length of stay with patient/caregiver yes   Prior to hospitilization cognitive status: Alert/Oriented;No Deficits   Prior to hospitalization functional status: Independent   Current cognitive status: Alert/Oriented;No Deficits   Current Functional Status: Independent;Needs Assistance   Facility Arrived From:   (N/A)   Lives With spouse   Able to Return to Prior Arrangements yes   Is patient able to care for self after discharge? Yes   Who are your caregiver(s) and their phone number(s)?   (Vinicius Steward Spouse     [phone] )   Patient's perception of discharge disposition home or selfcare   Readmission Within the Last 30 Days no previous admission in last 30 days   Patient currently being followed by outpatient case management? No   Patient currently receives any other outside agency services? No   Equipment Currently Used at Home none   Do you have any problems affording any of your prescribed medications? No   Is the patient taking medications as prescribed? yes   Does the patient have transportation home? Yes   Transportation Anticipated family or friend will provide   Dialysis Name and Scheduled days   (N/A)   Does the patient receive services at the Coumadin Clinic? No   Discharge Plan A Home with family   Discharge Plan B Home with family   DME Needed  Upon Discharge  none   Patient/Family in Agreement with Plan yes

## 2019-10-17 NOTE — PLAN OF CARE
10/17/19 1414   Final Note   Assessment Type Final Discharge Note   Anticipated Discharge Disposition Home   What phone number can be called within the next 1-3 days to see how you are doing after discharge?   ([phone])   Hospital Follow Up  Appt(s) scheduled? Yes   Discharge plans and expectations educations in teach back method with documentation complete? Yes   Right Care Referral Info   Post Acute Recommendation No Care

## 2019-10-18 NOTE — CARE UPDATE
Rapid Response Respiratory Therapy ETCO2 Check     Time of visit: 807     Code Status: No Order   : 1975  Age: 43 y.o.  Weight:   Wt Readings from Last 1 Encounters:   10/16/19 97.5 kg (214 lb 15.2 oz)     Sex: female  Race: White   Bed: 545/545 A:   MRN: [id number]    SITUATION     Evaluated patient for: ETCO2 Compliance     BACKGROUND     Patient has a past medical history of Anxiety, Arthritis, COPD (chronic obstructive pulmonary disease), Depression, Diabetes mellitus, Hypertension, and Neuromuscular disorder.    ASSESSMENT     Is the ETCO2 monitor on? (Yes/No)  Yes   Is the patient wearing a cannula? (Yes/No) Yes after I told her she needed to.  Are ETCO2 orders placed? (Yes/No) Yes  Is the patient on a PCA pump? (Yes/No) Yes  ETCO2 monitored: ETCO2 (mmHg): 39 mmHg  O2 Device/Concentration: Room Air  Pulse: 94 Respiratory rate: 18 Temperature: Temp: 99.6 °F (37.6 °C) BP: BP: 122/61 SpO2: 96%  Level of Consciousness: Level of Consciousness (AVPU): alert    INTERVENTIONS/RECOMMENDATIONS     Patient was on PCA pump, but not on EtCO2 cannula.  She stated she took it off to drink water, but the monitor was off.  I told the patient she needed to wear it if she wanted to have her PCA pump, she complied.  She is now wearing the cannula.  Will continue to monitor.    FOLLOW-UP     Please call back the Rapid Response RTElisabeth, RRT at x [phone] for any questions or concerns.

## 2019-10-22 ENCOUNTER — PATIENT MESSAGE (OUTPATIENT)
Dept: BARIATRICS | Facility: CLINIC | Age: 44
End: 2019-10-22

## 2019-10-29 NOTE — PROGRESS NOTES
BARIATRIC FOLLOW UP:    Chief Complaint   Patient presents with    Follow-up     HISTORY OF PRESENT ILLNESS: Leora Steward is a 43 y.o. female with a Body mass index is 36.59 kg/m². who presents for follow up s/p lap RNY with Dr. Ulrich on 10/16/2019.  she is doing well and tolerating the liquid diet.  she is losing weight appropriately.  Tried pudding yesterday, fast intake, felt like it sat in her chest. Has mild nausea that is improving. Also slight pulling to lower abdomen, ~1-2x/week, and this is improving as well. Denies abd wall bulge. No hypoglycemia.    EXERCISE & VITAMINS:  Adherent with bariatric vitamin regimen (No B1 in B complex, takes additional B1 tablet)  Exercise- walking    DIET:  Liquid Bariatric Diet.  1 protein shakes daily, ~27 grams protein.  ~40 oz H20 and Clear SF Liquids.      Review of Systems   Constitutional: Negative for chills, fever and malaise/fatigue.   Eyes: Negative for blurred vision and double vision.   Respiratory: Negative for cough, shortness of breath and wheezing.    Cardiovascular: Negative for chest pain, palpitations and leg swelling.   Gastrointestinal: Positive for nausea (per HPI). Negative for abdominal pain, blood in stool, constipation, diarrhea, heartburn, melena and vomiting.        Denies any dysphagia or globus sensation   Genitourinary: Negative for dysuria, frequency, hematuria and urgency.   Musculoskeletal: Negative for back pain, joint pain, myalgias and neck pain.   Neurological: Negative for dizziness, tingling and headaches.   Psychiatric/Behavioral: Negative for depression, substance abuse and suicidal ideas. The patient is not nervous/anxious.        Vitals:    10/30/19 0930   BP: 106/78   Pulse: 82       Physical Exam   Constitutional: She is oriented to person, place, and time. She appears well-developed and well-nourished.   HENT:   Head: Normocephalic and atraumatic.   Eyes: Conjunctivae and EOM are normal.   Neck: Neck supple.    Cardiovascular: Normal rate, regular rhythm, normal heart sounds and intact distal pulses. Exam reveals no gallop and no friction rub.   No murmur heard.  Pulmonary/Chest: Effort normal. No respiratory distress. She has no wheezes. She has no rales.   Abdominal: Soft. Bowel sounds are normal. She exhibits no distension and no mass. There is no tenderness. There is no rebound and no guarding. No hernia.   WHSS   Musculoskeletal: Normal range of motion. She exhibits no edema.   Neurological: She is alert and oriented to person, place, and time.   Skin: Skin is warm and dry. Capillary refill takes less than 2 seconds.   Psychiatric: She has a normal mood and affect. Her behavior is normal.   Vitals reviewed.    ASSESSMENT:  - Morbid obesity, Body mass index is 36.59 kg/m².,  s/p laparoscopic Almas-en-Y on 10/16/2019.  - Estimated goal weight is 50% EWL  - Co-morbidities: Anxiety/depression, COPD, HTN, DM type 2  - Good Weight loss, 15 lbs, 17% EWL  - Fair Exercise regimen  - Ok Vitamin Regimen  - Fair Diet    PLAN:  - Regular exercise and adherence to bariatric diet to achieve maximum weight loss.  - Discussed increased protein and increased hydration.  - OK to advance to puree. Stressed slow intake, pea-sized bites, chew that bite 20-30 times, then swallow.  Wait at least 1 minute or more before the next bite.    She agrees to call for any difficulties with advancing to puree.  - Follow-up with dietician to advance diet.  - Full bariatric vitamin regimen  - Anti-Acid medication, Omeprazole daily for 3 months.  - Lifting restriction, no lifting more than 10 lbs for 6 weeks total.  - Miralax daily for constipation, no fiber.  - No NSAIDs, Tylenol for pain.  - CanNOT swallow whole pills.  - RTC per post op schedule.  - Call the office for any issues.  - Check labs as scheduled.    30 minute visit, over 50% of time spent counseling patient face to face on diet, exercise, and weight loss.

## 2019-10-30 ENCOUNTER — OFFICE VISIT (OUTPATIENT)
Dept: BARIATRICS | Facility: CLINIC | Age: 44
End: 2019-10-30
Payer: COMMERCIAL

## 2019-10-30 ENCOUNTER — LAB VISIT (OUTPATIENT)
Dept: LAB | Facility: HOSPITAL | Age: 44
End: 2019-10-30
Attending: SURGERY
Payer: COMMERCIAL

## 2019-10-30 ENCOUNTER — CLINICAL SUPPORT (OUTPATIENT)
Dept: BARIATRICS | Facility: CLINIC | Age: 44
End: 2019-10-30
Payer: COMMERCIAL

## 2019-10-30 VITALS
HEIGHT: 63 IN | WEIGHT: 206.56 LBS | SYSTOLIC BLOOD PRESSURE: 106 MMHG | HEART RATE: 82 BPM | BODY MASS INDEX: 36.6 KG/M2 | DIASTOLIC BLOOD PRESSURE: 78 MMHG

## 2019-10-30 DIAGNOSIS — J44.89 COPD WITH CHRONIC BRONCHITIS: ICD-10-CM

## 2019-10-30 DIAGNOSIS — R79.89 LOW VITAMIN D LEVEL: ICD-10-CM

## 2019-10-30 DIAGNOSIS — R63.4 WEIGHT LOSS: ICD-10-CM

## 2019-10-30 DIAGNOSIS — Z98.84 HISTORY OF ROUX-EN-Y GASTRIC BYPASS: ICD-10-CM

## 2019-10-30 DIAGNOSIS — I10 ESSENTIAL HYPERTENSION: ICD-10-CM

## 2019-10-30 DIAGNOSIS — E66.01 MORBID OBESITY: ICD-10-CM

## 2019-10-30 DIAGNOSIS — E11.9 TYPE 2 DIABETES MELLITUS WITHOUT COMPLICATION, WITHOUT LONG-TERM CURRENT USE OF INSULIN: ICD-10-CM

## 2019-10-30 DIAGNOSIS — Z98.890 POSTOPERATIVE STATE: Primary | ICD-10-CM

## 2019-10-30 LAB
ALBUMIN SERPL BCP-MCNC: 3.8 G/DL (ref 3.5–5.2)
ALP SERPL-CCNC: 99 U/L (ref 55–135)
ALT SERPL W/O P-5'-P-CCNC: 28 U/L (ref 10–44)
ANION GAP SERPL CALC-SCNC: 12 MMOL/L (ref 8–16)
AST SERPL-CCNC: 24 U/L (ref 10–40)
BASOPHILS # BLD AUTO: 0.04 K/UL (ref 0–0.2)
BASOPHILS NFR BLD: 0.5 % (ref 0–1.9)
BILIRUB SERPL-MCNC: 0.3 MG/DL (ref 0.1–1)
BUN SERPL-MCNC: 9 MG/DL (ref 6–20)
CALCIUM SERPL-MCNC: 9.6 MG/DL (ref 8.7–10.5)
CHLORIDE SERPL-SCNC: 104 MMOL/L (ref 95–110)
CO2 SERPL-SCNC: 28 MMOL/L (ref 23–29)
CREAT SERPL-MCNC: 0.7 MG/DL (ref 0.5–1.4)
DIFFERENTIAL METHOD: ABNORMAL
EOSINOPHIL # BLD AUTO: 0.1 K/UL (ref 0–0.5)
EOSINOPHIL NFR BLD: 1.6 % (ref 0–8)
ERYTHROCYTE [DISTWIDTH] IN BLOOD BY AUTOMATED COUNT: 13.6 % (ref 11.5–14.5)
EST. GFR  (AFRICAN AMERICAN): >60 ML/MIN/1.73 M^2
EST. GFR  (NON AFRICAN AMERICAN): >60 ML/MIN/1.73 M^2
GLUCOSE SERPL-MCNC: 151 MG/DL (ref 70–110)
HCT VFR BLD AUTO: 45.4 % (ref 37–48.5)
HGB BLD-MCNC: 13.8 G/DL (ref 12–16)
IMM GRANULOCYTES # BLD AUTO: 0.04 K/UL (ref 0–0.04)
IMM GRANULOCYTES NFR BLD AUTO: 0.5 % (ref 0–0.5)
LYMPHOCYTES # BLD AUTO: 1.9 K/UL (ref 1–4.8)
LYMPHOCYTES NFR BLD: 21.7 % (ref 18–48)
MCH RBC QN AUTO: 28.6 PG (ref 27–31)
MCHC RBC AUTO-ENTMCNC: 30.4 G/DL (ref 32–36)
MCV RBC AUTO: 94 FL (ref 82–98)
MONOCYTES # BLD AUTO: 0.6 K/UL (ref 0.3–1)
MONOCYTES NFR BLD: 7.3 % (ref 4–15)
NEUTROPHILS # BLD AUTO: 5.9 K/UL (ref 1.8–7.7)
NEUTROPHILS NFR BLD: 68.4 % (ref 38–73)
NRBC BLD-RTO: 0 /100 WBC
PLATELET # BLD AUTO: 403 K/UL (ref 150–350)
PMV BLD AUTO: 10.3 FL (ref 9.2–12.9)
POTASSIUM SERPL-SCNC: 3.7 MMOL/L (ref 3.5–5.1)
PROT SERPL-MCNC: 7 G/DL (ref 6–8.4)
RBC # BLD AUTO: 4.82 M/UL (ref 4–5.4)
SODIUM SERPL-SCNC: 144 MMOL/L (ref 136–145)
VIT B12 SERPL-MCNC: 1677 PG/ML (ref 210–950)
WBC # BLD AUTO: 8.67 K/UL (ref 3.9–12.7)

## 2019-10-30 PROCEDURE — 85025 COMPLETE CBC W/AUTO DIFF WBC: CPT

## 2019-10-30 PROCEDURE — 99499 UNLISTED E&M SERVICE: CPT | Mod: S$GLB,,, | Performed by: DIETITIAN, REGISTERED

## 2019-10-30 PROCEDURE — 84425 ASSAY OF VITAMIN B-1: CPT

## 2019-10-30 PROCEDURE — 99999 PR PBB SHADOW E&M-EST. PATIENT-LVL I: ICD-10-PCS | Mod: PBBFAC,,, | Performed by: DIETITIAN, REGISTERED

## 2019-10-30 PROCEDURE — 82607 VITAMIN B-12: CPT

## 2019-10-30 PROCEDURE — 99024 PR POST-OP FOLLOW-UP VISIT: ICD-10-PCS | Mod: S$GLB,,, | Performed by: NURSE PRACTITIONER

## 2019-10-30 PROCEDURE — 36415 COLL VENOUS BLD VENIPUNCTURE: CPT

## 2019-10-30 PROCEDURE — 99999 PR PBB SHADOW E&M-EST. PATIENT-LVL I: CPT | Mod: PBBFAC,,, | Performed by: DIETITIAN, REGISTERED

## 2019-10-30 PROCEDURE — 99024 POSTOP FOLLOW-UP VISIT: CPT | Mod: S$GLB,,, | Performed by: NURSE PRACTITIONER

## 2019-10-30 PROCEDURE — 80053 COMPREHEN METABOLIC PANEL: CPT

## 2019-10-30 PROCEDURE — 99499 NO LOS: ICD-10-PCS | Mod: S$GLB,,, | Performed by: DIETITIAN, REGISTERED

## 2019-10-30 PROCEDURE — 99999 PR PBB SHADOW E&M-EST. PATIENT-LVL IV: ICD-10-PCS | Mod: PBBFAC,,, | Performed by: NURSE PRACTITIONER

## 2019-10-30 PROCEDURE — 99999 PR PBB SHADOW E&M-EST. PATIENT-LVL IV: CPT | Mod: PBBFAC,,, | Performed by: NURSE PRACTITIONER

## 2019-10-30 NOTE — PROGRESS NOTES
BARIATRIC ORBERA PATIENT EVALUATION    CHIEF COMPLAINT:   *** obesity with a Body mass index is 36.59 kg/m². and inability to {Maintain or lose weight:18525}.    HISTORY OF PRESENT ILLNESS:  Leora Steward is a 43 y.o.-year-old female presenting for *** obesity with a Body mass index is 36.59 kg/m². and inability to {Maintain or lose weight:65075}. The patient has tried ***.  she    Present severe GERD-   Swallowing difficulty/ disorders- No, denies history of   Structural abnormality of the esohpagus or pharynx- No  Prior gastric, hiatal, bariatric surgery- No  HH > 5cm- ***  Gastric ulcer- No  Gastroparesis- No  Achalasia- No  Gastric mass, tumors- No  IBD- No  UGI Bleeding condition, esophageal varices- No  Coagulopathy- No  ASA, NSAIDS, anticoagulants, gastric irritants, not under medical supervision **- No  Hepatic insufficiency or cirrhosis- No  Unstable thyroid disease- No   Pregnanct or breastfeeding- No  Uncontrolled psych illness, alcoholism, drug addiction- No  Eating disorders- No   Allergy to the product?- No  Helicobacter infection- ***    Willing to take PPI for duration of device implantation- Yes  Willing to participate in dietary follow-up/behavioral modification- Yes    HH on UGI***    LFTs- ***  Renal function- ***  EKG- ***  CXR- ***    Active support network- ***      CO-MORBIDITIES:  {Co-morbidities:99010}    PAST MEDICAL HISTORY:  Past Medical History:   Diagnosis Date    Anxiety     Arthritis     COPD (chronic obstructive pulmonary disease)     Depression     Diabetes mellitus     Hypertension     Neuromuscular disorder         PAST SURGICAL HISTORY:  Past Surgical History:   Procedure Laterality Date    CHOLECYSTECTOMY      HYSTERECTOMY      full    LAPAROSCOPIC GASTROENTEROSTOMY N/A 10/16/2019    Procedure: GASTROENTEROSTOMY, LAPAROSCOPIC, with intraop EGD;  Surgeon: Edmundo Ulrich MD;  Location: [facility] OR [address] [place] [address];  Service: General;  Laterality: N/A;    TUBAL LIGATION          FAMILY HISTORY:  Family History   Problem Relation Age of Onset    No Known Problems Mother     Diabetes Father     Hypertension Father     Diabetes Sister     Diabetes Brother     No Known Problems Daughter     No Known Problems Son     Thyroid disease Sister     No Known Problems Sister     No Known Problems Sister         SOCIAL HISTORY:   reports that she quit smoking about 6 years ago. Her smoking use included cigarettes. She smoked 1.50 packs per day. She has never used smokeless tobacco. She reports that she drank alcohol. She reports that she does not use drugs.     MEDICATIONS:  Medications {HAVE/HAVE NOT:56357} been reviewed.    ALLERGIES:  Allergies {HAVE/HAVE NOT:37027} been reviewed.    ROS:  Review of Systems    PE:  Physical Exam    DIAGNOSIS:  1. *** with a BMI of *** and inability to {Maintain or lose weight:01040}.  2. Co-morbidities: {Co-morbidities:31417}    PLAN:  The patient is a *** candidate for the Orbera balloon with ***.  The procedure and post-op care were discussed with the patient. All questions were answered.  Discussed possible long term issues: halitosis, deflation, gastroparesis  She understands procedure is a tool to aid in weight loss and that she needs to be committed to the diet and exercise for successful weight loss.    Expected wt loss *** (template***) 30%    *** meeting with pt, consents to be signed.   RD visit following.   Emend form completed***    ORDERS:  1. CXR  2. EKG  3. Bariatric Labs: CBC, CMP, B1, B12, Vit D***    Referring Physician: No ref. provider found  RTC: As scheduled.

## 2019-10-30 NOTE — PROGRESS NOTES
NUTRITION NOTE    Referring Physician: Edmundo Ulrich M.D.  Reason for MNT Referral: Follow-up 2 Weeks s/p Gastric Bypass    PAST MEDICAL HISTORY:  Denies nausea, vomiting, constipation and diarrhea.  Reports doing well.    Past Medical History:   Diagnosis Date    Anxiety     Arthritis     COPD (chronic obstructive pulmonary disease)     Depression     Diabetes mellitus     Hypertension     Neuromuscular disorder        CLINICAL DATA:  43 y.o. female.    CURRENT DIET:  Bariatric Liquid Diet    Diet Recall: 30 grams of protein/day; 24 oz of fluids/day    Diet Includes:   Protein Supplements: Slim fast low carb 20g + 7g PB2    EXERCISE:  Adequate light exercise. Walking a little    Restrictions to Exercise: None.    VITAMINS/MINERALS:  Adequate. See BA    ASSESSMENT:  Doing fairly well overall.  Inadequate protein intake.  Inadequate fluid intake.    BARIATRIC DIET DISCUSSION:  Instructed and provided written materials on bariatric pureed and soft diet plans.  Reinforced post-op nutrition guidelines.    PLAN/RECOMMONDATIONS:  Advance to bariatric pureed diet.  Advance to bariatric softe diet in 2 weeks as saúl.  Increase protein intake.  Increase fluid intake.  Continue light exercise.  Continue appropriate vitamins & minerals.    Return to clinic in 6 weeks.    SESSION TIME: 15 minutes

## 2019-10-30 NOTE — PATIENT INSTRUCTIONS
High Protein Pureed Diet    2 weeks after gastric bypass and sleeve you may be ready to add pureed food to your diet.  All food should be the consistency of baby food, or thinner.  Follow pureed diet for the next 2 weeks.    Protein - It is very important to pay attention to protein intake during this time.      Inadequate protein intake can cause:  ? Delayed Wound Healing  ? Hair Loss  ? Muscle Breakdown    Meal Plan - Eat 3-4 meals per day (2-4 tbsp each), with protein supplements in between to meet protein needs.  Meeting protein needs daily will help increase healing, decrease muscle loss, and increase weight loss.  Your goal is  grams of protein a day.    Protein First - Always eat the foods with the highest protein first.  Foods high in protein include milk, yogurt, cheese, egg whites, and blenderized meat, seafood, and beans.    Fluids - Keep track in your journal of how much you are drinking; you should try to drink at least 64oz of fluids every day.      Foods allowed: Portion size Protein (g)   ? Sugar-free clear liquids As desired 0   ? Skim or 1% milk ½ cup 4   ? Sugar free pudding, light yogurt, custard (use skim or 1% milk in preparation) 3 oz 2.5   ? Strained baby food meats, or home-made pureed lean meats and shrimp 1 oz 7   ? Beans (red, white, black, lima, lewis, fat free refried, hummus) and lentils ¼ cup 4   ? Low-fat/fat free cheese.(cottage cheese, mozzarella string cheese, ricotta cheese, Laughing Cow, Baby Bell, cheddar, etc) ¼ cup 7-8   ? Scrambled eggs or Egg Beaters 1 or ¼ cup 6   ? Edamame or Tofu, mashed ¼ cup 5   ? Unflavored protein powder (add to 1 scoop to  98% fat free soups or SF pudding) 3 Tbsp 9   ? *PB2: peanut powder (45 calories) 2 Tbsp 5     *PB2 powdered peanut butter: 45 calories vs. 190 calories in 2 tbsp of regular peanut butter. Purchase online at [employer], or  at various [employer], [employer], [employer], [employer] and [employer].      Bariatric Liquid/Pureed Sample  Menu    3-4 small meals plus 2-3 protein drinks per day.      8-8:30am 1 egg or ¼ cup Egg Beaters   9-9:30am 1 cup water, or decaf coffee or tea   10-10:30am Protein drink, 30g protein   11-11:30am 2 tbsp low-fat cottage cheese, and 1 tbsp pureed peaches   12-12:30pm 1 cup water, or sugar-free lemonade    1-1:30pm 2 tbsp pureed chicken, and 1 tbsp pureed carrots    2-2:30pm 1 cup water, or sugar-free lemonade   3-3:30pm Protein drink, 30g protein   5-5:30pm 1 cup water    6-6:30pm 1 cup hi-protein creamy chicken soup 14g protein (see Recipe below)   7-7:30pm 1 cup water, or sugar-free fruit punch    8-8:30pm 1 cup water       This sample menu provides approx. 80g protein and 64oz fluids.  Liquid protein supplements should contain 20-30g protein and less than 4 grams of sugar each.    ? Sip fluids continuously in between meals.  Drink at least ¼ cup every 15 minutes.  ? For fluids: ¼ cup = 2 oz = 4 tbsp       RECIPE IDEAS for Bariatric Pureed Diet:    Hi-Protein Creamy Chicken Soup: (10g protein per 1 cup serving)  Empty 1 can of 98% fat free cream of chicken soup into saucepan. Then  blend 1 scoop of unflavored protein powder with 1 can of skim milk until smooth.  Add protein milk to saucepan and heat to warm. (Note: Do NOT boil. Protein powder may clump if heated too hot).     Hi-Protein Pudding: (14g protein per ½ cup serving)  Add 2 scoops protein powder to 2 cups cold skim milk and mix well.  Stir in dry Jell-O Sugar-Free Instant Pudding mix.  Chill and Enjoy!    Tuna Mousse (12g protein per ¼ cup serving) Page 135 in book Eating Well After Weight Loss Surgery.  In a  or , combine all ingredients and pulse until smooth.  2 6-ounce cans tuna packed in water, drained  2 tbsp low-fat mayonnaise  2 tbsp fat-free sour cream  2 tbsp fat-free cream cheese, softened  ½ cup shallots, finely chopped  1 tbsp lemon juice  ¼ tsp ground pepper  ½ tsp celery seed    Chocolate Peanut Butter Mousse  (28g  protein total)  6oz plain Greek yogurt  4 tbsp chocolate PB2

## 2019-11-04 LAB — VIT B1 BLD-MCNC: 82 UG/L (ref 38–122)

## 2019-11-05 ENCOUNTER — PATIENT MESSAGE (OUTPATIENT)
Dept: BARIATRICS | Facility: CLINIC | Age: 44
End: 2019-11-05

## 2019-11-07 ENCOUNTER — PATIENT MESSAGE (OUTPATIENT)
Dept: BARIATRICS | Facility: CLINIC | Age: 44
End: 2019-11-07

## 2019-11-07 RX ORDER — ONDANSETRON 8 MG/1
8 TABLET, ORALLY DISINTEGRATING ORAL EVERY 6 HOURS PRN
Qty: 30 TABLET | Refills: 0 | Status: SHIPPED | OUTPATIENT
Start: 2019-11-07 | End: 2023-02-15

## 2019-11-07 RX ORDER — ONDANSETRON 8 MG/1
8 TABLET, ORALLY DISINTEGRATING ORAL EVERY 6 HOURS PRN
Qty: 30 TABLET | Refills: 0 | Status: CANCELLED | OUTPATIENT
Start: 2019-11-07

## 2019-11-07 NOTE — TELEPHONE ENCOUNTER
Pended refill for  Zofran to go to her [facility] pharmacy in [place] and awaiting his approval.

## 2019-12-11 ENCOUNTER — LAB VISIT (OUTPATIENT)
Dept: LAB | Facility: HOSPITAL | Age: 44
End: 2019-12-11
Payer: COMMERCIAL

## 2019-12-11 ENCOUNTER — CLINICAL SUPPORT (OUTPATIENT)
Dept: BARIATRICS | Facility: CLINIC | Age: 44
End: 2019-12-11
Payer: COMMERCIAL

## 2019-12-11 ENCOUNTER — OFFICE VISIT (OUTPATIENT)
Dept: BARIATRICS | Facility: CLINIC | Age: 44
End: 2019-12-11
Payer: COMMERCIAL

## 2019-12-11 VITALS
BODY MASS INDEX: 34.18 KG/M2 | HEART RATE: 77 BPM | WEIGHT: 192.88 LBS | HEIGHT: 63 IN | DIASTOLIC BLOOD PRESSURE: 74 MMHG | SYSTOLIC BLOOD PRESSURE: 138 MMHG

## 2019-12-11 DIAGNOSIS — I10 ESSENTIAL HYPERTENSION: ICD-10-CM

## 2019-12-11 DIAGNOSIS — E66.01 MORBID OBESITY: ICD-10-CM

## 2019-12-11 DIAGNOSIS — E11.9 TYPE 2 DIABETES MELLITUS WITHOUT COMPLICATION, WITHOUT LONG-TERM CURRENT USE OF INSULIN: ICD-10-CM

## 2019-12-11 DIAGNOSIS — Z98.84 HISTORY OF ROUX-EN-Y GASTRIC BYPASS: Primary | ICD-10-CM

## 2019-12-11 DIAGNOSIS — R79.89 LOW VITAMIN D LEVEL: ICD-10-CM

## 2019-12-11 DIAGNOSIS — J44.89 COPD WITH CHRONIC BRONCHITIS: ICD-10-CM

## 2019-12-11 DIAGNOSIS — E66.01 MORBID OBESITY WITH BMI OF 40.0-44.9, ADULT: ICD-10-CM

## 2019-12-11 LAB
25(OH)D3+25(OH)D2 SERPL-MCNC: 34 NG/ML (ref 30–96)
ALBUMIN SERPL BCP-MCNC: 3.5 G/DL (ref 3.5–5.2)
ALP SERPL-CCNC: 105 U/L (ref 55–135)
ALT SERPL W/O P-5'-P-CCNC: 21 U/L (ref 10–44)
ANION GAP SERPL CALC-SCNC: 8 MMOL/L (ref 8–16)
AST SERPL-CCNC: 18 U/L (ref 10–40)
BASOPHILS # BLD AUTO: 0.03 K/UL (ref 0–0.2)
BASOPHILS NFR BLD: 0.5 % (ref 0–1.9)
BILIRUB SERPL-MCNC: 0.3 MG/DL (ref 0.1–1)
BUN SERPL-MCNC: 10 MG/DL (ref 6–20)
CALCIUM SERPL-MCNC: 9.3 MG/DL (ref 8.7–10.5)
CHLORIDE SERPL-SCNC: 108 MMOL/L (ref 95–110)
CHOLEST SERPL-MCNC: 176 MG/DL (ref 120–199)
CHOLEST/HDLC SERPL: 4.4 {RATIO} (ref 2–5)
CO2 SERPL-SCNC: 28 MMOL/L (ref 23–29)
CREAT SERPL-MCNC: 0.6 MG/DL (ref 0.5–1.4)
DIFFERENTIAL METHOD: ABNORMAL
EOSINOPHIL # BLD AUTO: 0.1 K/UL (ref 0–0.5)
EOSINOPHIL NFR BLD: 1.8 % (ref 0–8)
ERYTHROCYTE [DISTWIDTH] IN BLOOD BY AUTOMATED COUNT: 14.2 % (ref 11.5–14.5)
EST. GFR  (AFRICAN AMERICAN): >60 ML/MIN/1.73 M^2
EST. GFR  (NON AFRICAN AMERICAN): >60 ML/MIN/1.73 M^2
GLUCOSE SERPL-MCNC: 122 MG/DL (ref 70–110)
HCT VFR BLD AUTO: 43.7 % (ref 37–48.5)
HDLC SERPL-MCNC: 40 MG/DL (ref 40–75)
HDLC SERPL: 22.7 % (ref 20–50)
HGB BLD-MCNC: 13.5 G/DL (ref 12–16)
IMM GRANULOCYTES # BLD AUTO: 0.01 K/UL (ref 0–0.04)
IMM GRANULOCYTES NFR BLD AUTO: 0.2 % (ref 0–0.5)
IRON SERPL-MCNC: 62 UG/DL (ref 30–160)
LDLC SERPL CALC-MCNC: 116 MG/DL (ref 63–159)
LYMPHOCYTES # BLD AUTO: 1.8 K/UL (ref 1–4.8)
LYMPHOCYTES NFR BLD: 27.6 % (ref 18–48)
MCH RBC QN AUTO: 29.2 PG (ref 27–31)
MCHC RBC AUTO-ENTMCNC: 30.9 G/DL (ref 32–36)
MCV RBC AUTO: 95 FL (ref 82–98)
MONOCYTES # BLD AUTO: 0.5 K/UL (ref 0.3–1)
MONOCYTES NFR BLD: 7.2 % (ref 4–15)
NEUTROPHILS # BLD AUTO: 4.2 K/UL (ref 1.8–7.7)
NEUTROPHILS NFR BLD: 62.7 % (ref 38–73)
NONHDLC SERPL-MCNC: 136 MG/DL
NRBC BLD-RTO: 0 /100 WBC
PLATELET # BLD AUTO: 302 K/UL (ref 150–350)
PMV BLD AUTO: 10.9 FL (ref 9.2–12.9)
POTASSIUM SERPL-SCNC: 3.8 MMOL/L (ref 3.5–5.1)
PROT SERPL-MCNC: 6.5 G/DL (ref 6–8.4)
RBC # BLD AUTO: 4.62 M/UL (ref 4–5.4)
SATURATED IRON: 23 % (ref 20–50)
SODIUM SERPL-SCNC: 144 MMOL/L (ref 136–145)
TOTAL IRON BINDING CAPACITY: 274 UG/DL (ref 250–450)
TRANSFERRIN SERPL-MCNC: 185 MG/DL (ref 200–375)
TRIGL SERPL-MCNC: 100 MG/DL (ref 30–150)
VIT B12 SERPL-MCNC: 880 PG/ML (ref 210–950)
WBC # BLD AUTO: 6.66 K/UL (ref 3.9–12.7)

## 2019-12-11 PROCEDURE — 84425 ASSAY OF VITAMIN B-1: CPT

## 2019-12-11 PROCEDURE — 99499 NO LOS: ICD-10-PCS | Mod: S$GLB,,, | Performed by: DIETITIAN, REGISTERED

## 2019-12-11 PROCEDURE — 99999 PR PBB SHADOW E&M-EST. PATIENT-LVL I: CPT | Mod: PBBFAC,,, | Performed by: DIETITIAN, REGISTERED

## 2019-12-11 PROCEDURE — 82607 VITAMIN B-12: CPT

## 2019-12-11 PROCEDURE — 85025 COMPLETE CBC W/AUTO DIFF WBC: CPT

## 2019-12-11 PROCEDURE — 83540 ASSAY OF IRON: CPT

## 2019-12-11 PROCEDURE — 99024 PR POST-OP FOLLOW-UP VISIT: ICD-10-PCS | Mod: S$GLB,,, | Performed by: PHYSICIAN ASSISTANT

## 2019-12-11 PROCEDURE — 99999 PR PBB SHADOW E&M-EST. PATIENT-LVL IV: ICD-10-PCS | Mod: PBBFAC,,, | Performed by: PHYSICIAN ASSISTANT

## 2019-12-11 PROCEDURE — 80061 LIPID PANEL: CPT

## 2019-12-11 PROCEDURE — 80053 COMPREHEN METABOLIC PANEL: CPT

## 2019-12-11 PROCEDURE — 36415 COLL VENOUS BLD VENIPUNCTURE: CPT

## 2019-12-11 PROCEDURE — 99999 PR PBB SHADOW E&M-EST. PATIENT-LVL I: ICD-10-PCS | Mod: PBBFAC,,, | Performed by: DIETITIAN, REGISTERED

## 2019-12-11 PROCEDURE — 99999 PR PBB SHADOW E&M-EST. PATIENT-LVL IV: CPT | Mod: PBBFAC,,, | Performed by: PHYSICIAN ASSISTANT

## 2019-12-11 PROCEDURE — 99024 POSTOP FOLLOW-UP VISIT: CPT | Mod: S$GLB,,, | Performed by: PHYSICIAN ASSISTANT

## 2019-12-11 PROCEDURE — 99499 UNLISTED E&M SERVICE: CPT | Mod: S$GLB,,, | Performed by: DIETITIAN, REGISTERED

## 2019-12-11 PROCEDURE — 82306 VITAMIN D 25 HYDROXY: CPT

## 2019-12-11 RX ORDER — LANCETS 33 GAUGE
EACH MISCELLANEOUS
COMMUNITY
Start: 2019-12-04 | End: 2022-04-29

## 2019-12-11 RX ORDER — MONTELUKAST SODIUM 10 MG/1
TABLET ORAL
COMMUNITY
Start: 2019-12-04

## 2019-12-11 RX ORDER — FAMOTIDINE 20 MG/1
20 TABLET, FILM COATED ORAL 2 TIMES DAILY
Qty: 60 TABLET | Refills: 6 | Status: SHIPPED | OUTPATIENT
Start: 2019-12-11 | End: 2022-04-29

## 2019-12-11 NOTE — PROGRESS NOTES
NUTRITION NOTE    Referring Physician: Edmundo Ulrich M.D.  Reason for MNT Referral: Follow-up 8 Weeks s/p Gastric Bypass    PAST MEDICAL HISTORY:  Denies nausea, vomiting, constipation and diarrhea.  Reports doing well. She reports a bite of cheesecake made her nauseous.    Past Medical History:   Diagnosis Date    Anxiety     Arthritis     COPD (chronic obstructive pulmonary disease)     Depression     Diabetes mellitus     Hypertension     Neuromuscular disorder        CLINICAL DATA:  44 y.o. female.    CURRENT DIET:  Bariatric Soft Diet    Diet Recall: 60-80 grams of protein/day; 48 oz of fluids/day    B: 1/2 scrambled egg  - low carb Slim Fast  L: Lean Cuisine turkey and sweet potatoes  Sn: beef jerky   D: 1/2 oz pork chop, 1/2 cup white beans, 1/4 cup cauli-rice  Sn: frozen greek yogurt    EXERCISE:  Adequate light exercise.    Restrictions to Exercise: None.    VITAMINS/MINERALS:  See BA    ASSESSMENT:  Doing well overall.  Adequate protein intake.  Adequate fluid intake.  Advancing diet appropriately.  Exercising.  Adequate vitamins & minerals.    BARIATRIC DIET DISCUSSION:  Instructed and provided written materials on bariatric soft diet plan.  Reinforced post-op nutrition guidelines.    PLAN / RECOMMENDATIONS:  Continue bariatric soft diet.  - discussed high prot sf pudding dessert for special occassions  Maintain protein intake over 80g/day.  Maintain fluid intake.  Continue light exercise.  Continue appropriate vitamins & minerals.    Return to clinic in 2 months.    SESSION TIME: 15 minutes

## 2019-12-11 NOTE — PATIENT INSTRUCTIONS
Meal Ideas for Regular Bariatric Diet  *Recipes and products available at [email or web address]      Breakfast: (15-20g protein)    - Egg white omelet: 2 egg whites or ½ cup Egg Beaters. (Optional proteins: cheese, shrimp, black beans, chicken, sliced turkey) (Optional veggies: tomatoes, salsa, spinach, mushrooms, onions, green peppers, or small slice avocado)     - Egg and sausage: 1 egg or ¼ cup Egg Beaters (any variety), with 1 brock or 2 links of Turkey sausage or Veggie breakfast sausage (Ringio or AdviceIQ)    - Crust-less breakfast quiche: To make a glass pie dish, mix 4oz part skim Ricotta, 1 cup skim milk, and 2 eggs as your base. Add protein: shredded cheese, sliced lean ham or turkey, turkey wing/sausage. Add veggies: tomato, onion, green onion, mushroom, green pepper, spinach, etc.    - Yogurt parfait: Mix 1 - 6oz container Dannon Light N Fit vanilla yogurt, with ¼ cup crushed unsalted nuts    - Cottage cheese and fruit: ½ cup part-skim cottage cheese or ricotta cheese topped with fresh fruit or sugar free preserves     - Adia Moncada's Vanilla Egg custard* (add 2 Tbsp instant coffee granules to make Cappuccino Custard*)    - Hi-Protein café latte (skim milk, decaf coffee, 1 scoop protein powder). Optional to add Sugar free syrup or extract flavoring.    - Breakfast Lox: spread fat free cream cheese on slices of smoked salmon. Serve over scrambled or egg over easy (sauteed with nonstick cookspray) OR on a cucumber slice    - Eggwhich: Scramble or cook 1 large egg over easy using nonstick cookspray. Place between 2 slices of Yemeni wing and low fat cheese.     Lunch: (20-30g protein)    - ½ cup Black bean soup (Homemade or Progresso), with ¼ cup shredded low-fat cheese. Top with chopped tomato or fresh salsa.     - Lean deli turkey breast and low-fat sliced cheese, mustard or light bridges to moisten, rolled up together, or wrapped in a César lettuce leaf    - Chicken salad made from dinner  leftovers, moisten with low-fat salad dressing or light bridges. Also try leftover salmon, shrimp, tuna or boiled eggs. Serve ½ cup over dark green salad    - Fat-free canned refried beans, topped with ¼ cup shredded low-fat cheese. Top with chopped tomato or fresh salsa.     - Greek salad: Top mixed greens with 1-2oz grilled chicken, tomatoes, red onions, 2-3 kalamata olives, and sprinkle lightly with feta cheese. Spritz with Balsamic vinegar to taste.     - Crust-less lunch quiche: To make a glass pie dish, mix 4oz part skim Ricotta, 1 cup skim milk, and 2 eggs as your base. Add protein: shredded cheese, sliced lean ham or turkey, shrimp, chicken. Add veggies: tomato, onion, green onion, mushroom, green pepper, spinach, artichoke, broccoli, etc.    - Pizza bake: spread a  tobi brandie mushroom with tomato sauce, low-fat shredded mozzarella and turkey pepperoni or [place] wing. Add any veggies. Roast for 10-15 minutes, until cheese melted.     - Cucumber crab bites: Spread ¼ cup crab dip (lump crabmeat + light cream cheese and green onions) over sliced cucumber.     - Chicken with light spinach and artichoke dip*: Puree in : 6oz cooked and drained spinach, 2 cloves garlic, 1 can cannelloni beans, ½ cup chopped green onions, 1 can drained artichoke hearts (not marinated in oil), lemon juice and basil. Mix in 2oz chopped up chicken.    Supper: (20-30g protein)    - Serve grilled fish over dark green salad tossed with low-fat dressing, served with grilled asparagus mccrary     - Rotisserie chicken salad: served with sliced strawberries, walnuts, fat-free feta cheese crumbles and 1 tbsp Ferriss Own Light Raspberry [place] Vinaigrette    - Shrimp cocktail: Dip cold boiled shrimp in homemade low-sugar cocktail sauce (1/2 cup Bertrand One Carb ketchup, 2 tbsp horseradish, 1/4 tsp hot sauce, 1 tsp Worcestershire sauce, 1 tbsp freshly-squeezed lemon juice). Serve with dark green salad, walnuts, and crumbled blue  cheese drizzled with olive oil and Balsamic vinegar    - Tuna Melt: Spread tuna salad onto 2 thick slices of tomato. Top with low-fat cheese and broil until cheese is melted. May also be made with chicken salad of shrimp salad. [place] with different types of cheeses.    - Chicken or beef fajitas (no tortilla, rice, beans, chips). Top meat and veggies w/ fresh salsa, fat free sour cream.     - Homemade low-fat Chili using extra lean ground beef or ground turkey. Top with shredded cheese and salsa as desired. May add dollop fat-free sour cream if desired    - Chicken parmesan: Top chicken breast w/ low sugar marinara sauce, mozzarella cheese and bake until chicken reaches 165*.  Serve w/ spaghetti SQUASH or Kenyan cut green beans    - Dinner Omelet with shrimp or chicken and onion, green peppers and chives.    - No noodle lasagna: Use sliced zucchini or eggplant in place of noodles.  Layer with part skim ricotta cheese and low sugar meat sauce (use very lean ground beef or ground turkey).    - Mexican chicken bake: Bake chunks of chicken breast or thigh with taco seasoning, Pace brand enchilada sauce, green onions and low-fat cheese. Serve with ¼ cup black beans or fat free refried beans topped with chopped tomatoes or salsa.    - Jessica frozen meatballs, simmered in Classico Marinara sauce. Different flavors of salsa or spaghetti sauce create different dishes! Sprinkle with parmesan cheese. Serve with grilled or steamed veggies, or a dark green salad.    - Simmer boneless skinless chicken thigh chunks in Classico Marinara sauce or roasted salsa until tender with chopped onion, bell pepper, garlic, mushrooms, spinach, etc.     - Hamburger or veggie burger, without the bun, dressed the way you like. Served with grilled or steamed veggies.    - Eggplant parmesan: Bake slices of eggplant at 350 degrees for 15 minutes. Layer tomato sauce, sliced eggplant and low-fat mozzarella cheese in a baking dish and cover with  foil. Bake 30-40 more minutes or until bubbly. Uncover and bake at 400 degrees for about 15 more minutes, or until top is slightly crisp.    - Fish tacos: grilled/baked white fish, wrapped in César lettuce leaf, topped with salsa, shredded low-fat cheese, and light coleslaw.    - Chicken christy: Sprinkle chicken w/ 1 tsp of hidden valley ranch dip mix. Then grill chicken and top with black beans, salsa and 1 tsp fat free sour cream.     - Cauliflower pizza crust: Use cauliflower as crust (see recipe on pinterest, no flour!). Top w/ low fat cheese, turkey pepperoni and veggies and bake again    - chicken or turkey crust pizza: use ground chicken or turkey instead of cauliflower, spread in [place] and bake at 350 for about 20-30 minutes(may want to add garlic, black pepper, oregano and other herbs to ground meat mixture).  Remove and top w/ low fat cheese, turkey pepperoni and veggies and bake again for another 10 minutes or until cheese is browned.     Snacks: (100-200 calories; >5g protein)    - 1 low-fat cheese stick with 8 cherry tomatoes or 1 serving fresh fruit  - 4 thin slices fat-free turkey breast and 1 slice low-fat cheese  - 4 thin slices fat-free honey ham with wedge of melon  - 6-8 edamame pods (equivalent to about 1/4 cup edamame without pods).   - 1/4 cup unsalted nuts with ½ cup fruit  - 6-oz container Dannon Light n Fit vanilla yogurt, topped with 1oz unsalted nuts         - apple, celery or baby carrots spread with 2 Tbsp PB2  - apple slices with 1 oz slice low-fat cheese  - Apple slices dipped in 2 Tbsp of PB2  - celery, cucumber, bell pepper or baby carrots dipped in ¼ cup hummus bean spread or light spinach and artichoke dip (*recipe in lunch section)  - celery, cucumber, baby carrots dipped in high protein greek yogurt (Mix 16 oz plain greek yogurt + 1 packet of hidden valley ranch dip mix)  - Cisco Links Beef Steak - 14g protein! (similar to beef jerky)  - 2 wedges Laughing Cow - Light Herb  & Garlic Cheese with sliced cucumber or green bell pepper  - 1/2 cup low-fat cottage cheese with ¼ cup fruit or ¼ cup salsa  - RTD Protein drinks: Atkins, Low Carb Slim Fast, EAS light, Muscle Milk Light, etc.  - Homemade Protein drinks: GNC Soy95, Isopure, Nectar, UNJURY, Whey Gourmet, etc. Mix 1 scoop powder with 8oz skim/1% milk or light soymilk.  - Protein bars: Atkins, EAS, Pure Protein, Think Thin, Detour, etc. Must have 0-4 grams sugar - Read the label.    Takeout Options: No more than twice/week  Deli - Salads (no pasta or rice), meats, cheeses. Roasted chicken. Lox (salmon)    Mexican - Platters which don't include tortillas, chips, or rice. Go easy on the beans. Example: Fajitas without the tortillas. Ask the  not to bring chips to the table if they are too tempting.    Greek - Meat or fish and vegetable, but no bread or rice. Including hummus, baba ganoush, etc, is OK. Most sit-down Greek restaurants can provide you with cucumber slices for dipping instead of isaac bread.    Fast Food (Avoid as much as possible) - Salads (no croutons and limit salad dressing to 2 tbsp), grilled chicken sandwich without the bun and ask for no bridges. Sashas low fat chili or Taco Bell pintos and cheese.    BBQ - The meats are fine if you ask for sauces on the side, but most of the traditional side dishes are loaded with carbs. Mele slaw, baked beans and BBQ sauce are typically made with sugar.    Chinese - Nothing deep-fried, no rice or noodles. Many Chinese sauces have starch and sugar in them, so you'll have to use your judgement. If you find that these sauces trigger cravings, or cause Dumping, you can ask for the sauce to be made without sugar or just use soy sauce.

## 2019-12-11 NOTE — PROGRESS NOTES
"BARIATRIC FOLLOW UP:    Chief Complaint   Patient presents with    Follow-up     HISTORY OF PRESENT ILLNESS: Leora Steward is a 44 y.o. female with a Body mass index is 34.17 kg/m². who presents for follow up s/p lap RNY with Dr. Ulrich on 10/16/2019.  she is doing well and tolerating the liquid diet.  she is losing weight appropriately.      States mild stomach ache, burning aching sensation. Intermittent started yesterday. Up high, mild traveling to the chest ( only once for a couple of hours).    Off of diabetes medications and off of htn medications, most recent A1c 5.8.   Tried cheesecake for Thanksgiving became "sick: nauseated"   Started exercising the day before thanksgiving, exercising daily since then  EXERCISE & VITAMINS:  Adherent with bariatric vitamin regimen (No B1 in B complex, takes additional B1 tablet)  Exercise- walking    DIET:  [facility] Bariatric Diet.  ~  75-80 grams protein.  ~ 50 oz H20 and Clear SF Liquids.      Scrambled egg or no sugar oatmeal or shake   Leftovers from supper ( lean cuisine without rice or pasta)   White beans with cauliflower rice   Pork chop   Snack: beef jerky   Drink: H20, slim fast with low sugar with protein   Protein powder mixture   Protein 2.0     Review of Systems   Constitutional: Negative for chills, fever and malaise/fatigue.   Eyes: Negative for blurred vision and double vision.   Respiratory: Negative for cough, shortness of breath and wheezing.    Cardiovascular: Negative for chest pain, palpitations and leg swelling.   Gastrointestinal: Positive for nausea (per HPI). Negative for abdominal pain, blood in stool, constipation, diarrhea, heartburn, melena and vomiting.        Denies any dysphagia or globus sensation   Genitourinary: Negative for dysuria, frequency, hematuria and urgency.   Musculoskeletal: Negative for back pain, joint pain, myalgias and neck pain.   Neurological: Negative for dizziness, tingling and headaches.   Psychiatric/Behavioral: " Negative for depression, substance abuse and suicidal ideas. The patient is not nervous/anxious.        Vitals:    12/11/19 0822   BP: 138/74   Pulse: 77       Physical Exam   Constitutional: She is oriented to person, place, and time. She appears well-developed and well-nourished.   HENT:   Head: Normocephalic and atraumatic.   Eyes: Conjunctivae and EOM are normal.   Neck: Neck supple.   Cardiovascular: Normal rate, regular rhythm, normal heart sounds and intact distal pulses. Exam reveals no gallop and no friction rub.   No murmur heard.  Pulmonary/Chest: Effort normal. No respiratory distress. She has no wheezes. She has no rales.   Abdominal: Soft. Bowel sounds are normal. She exhibits no distension and no mass. There is no tenderness. There is no rebound and no guarding. No hernia.   WHSS   Musculoskeletal: Normal range of motion. She exhibits no edema.   Neurological: She is alert and oriented to person, place, and time.   Skin: Skin is warm and dry. Capillary refill takes less than 2 seconds.   Psychiatric: She has a normal mood and affect. Her behavior is normal.   Vitals reviewed.    ASSESSMENT:  - Morbid obesity, Body mass index is 34.17 kg/m².,  s/p laparoscopic Almas-en-Y on 10/16/2019.  - Estimated goal weight is 50% EWL  - Co-morbidities: Anxiety/depression, COPD, HTN, DM type 2  - Good Weight loss, 30 lbs, 34% EWL  - Fair Exercise regimen  - Ok Vitamin Regimen  - Fair Diet    PLAN:   - 2 weeks of Pepcid with prilosec  - Regular exercise and adherence to bariatric diet to achieve maximum weight loss.  - Discussed increased protein and increased hydration.    She agrees to call for any difficulties with advancing to puree.  - Follow-up with dietician to advance diet.  - Full bariatric vitamin regimen  - Anti-Acid medication, Omeprazole daily for 3 months.  - Lifting restriction, no lifting more than 10 lbs for 6 weeks total.  - Miralax daily for constipation, no fiber.  - No NSAIDs, Tylenol for pain.  -  CanNOT swallow whole pills.  - RTC per post op schedule.  - Call the office for any issues.  - Check labs as scheduled.    30 minute visit, over 50% of time spent counseling patient face to face on diet, exercise, and weight loss.

## 2019-12-13 ENCOUNTER — PATIENT MESSAGE (OUTPATIENT)
Dept: BARIATRICS | Facility: CLINIC | Age: 44
End: 2019-12-13

## 2019-12-13 DIAGNOSIS — R12 HEARTBURN: ICD-10-CM

## 2019-12-13 DIAGNOSIS — R10.13 EPIGASTRIC ABDOMINAL PAIN: Primary | ICD-10-CM

## 2019-12-14 LAB — VIT B1 BLD-MCNC: 106 UG/L (ref 38–122)

## 2020-01-13 ENCOUNTER — PATIENT MESSAGE (OUTPATIENT)
Dept: BARIATRICS | Facility: CLINIC | Age: 45
End: 2020-01-13

## 2020-03-11 ENCOUNTER — LAB VISIT (OUTPATIENT)
Dept: LAB | Facility: HOSPITAL | Age: 45
End: 2020-03-11
Payer: COMMERCIAL

## 2020-03-11 ENCOUNTER — OFFICE VISIT (OUTPATIENT)
Dept: BARIATRICS | Facility: CLINIC | Age: 45
End: 2020-03-11
Payer: COMMERCIAL

## 2020-03-11 VITALS
DIASTOLIC BLOOD PRESSURE: 61 MMHG | WEIGHT: 171.5 LBS | HEIGHT: 63 IN | SYSTOLIC BLOOD PRESSURE: 109 MMHG | BODY MASS INDEX: 30.39 KG/M2 | HEART RATE: 67 BPM

## 2020-03-11 DIAGNOSIS — R63.4 WEIGHT LOSS: ICD-10-CM

## 2020-03-11 DIAGNOSIS — R79.89 LOW VITAMIN D LEVEL: ICD-10-CM

## 2020-03-11 DIAGNOSIS — I10 ESSENTIAL HYPERTENSION: ICD-10-CM

## 2020-03-11 DIAGNOSIS — J44.89 COPD WITH CHRONIC BRONCHITIS: ICD-10-CM

## 2020-03-11 DIAGNOSIS — Z71.9 HEALTH COUNSELING: ICD-10-CM

## 2020-03-11 DIAGNOSIS — E66.01 MORBID OBESITY: ICD-10-CM

## 2020-03-11 DIAGNOSIS — E11.9 TYPE 2 DIABETES MELLITUS WITHOUT COMPLICATION, WITHOUT LONG-TERM CURRENT USE OF INSULIN: ICD-10-CM

## 2020-03-11 DIAGNOSIS — Z98.84 S/P LAPAROSCOPIC SLEEVE GASTRECTOMY: Primary | ICD-10-CM

## 2020-03-11 PROBLEM — E66.811 CLASS 1 OBESITY DUE TO EXCESS CALORIES IN ADULT: Status: ACTIVE | Noted: 2019-06-18

## 2020-03-11 PROBLEM — E66.09 CLASS 1 OBESITY DUE TO EXCESS CALORIES IN ADULT: Status: ACTIVE | Noted: 2019-06-18

## 2020-03-11 LAB
ALBUMIN SERPL BCP-MCNC: 3.5 G/DL (ref 3.5–5.2)
ALP SERPL-CCNC: 118 U/L (ref 55–135)
ALT SERPL W/O P-5'-P-CCNC: 15 U/L (ref 10–44)
ANION GAP SERPL CALC-SCNC: 5 MMOL/L (ref 8–16)
AST SERPL-CCNC: 18 U/L (ref 10–40)
BASOPHILS # BLD AUTO: 0.02 K/UL (ref 0–0.2)
BASOPHILS NFR BLD: 0.3 % (ref 0–1.9)
BILIRUB SERPL-MCNC: 0.4 MG/DL (ref 0.1–1)
BUN SERPL-MCNC: 12 MG/DL (ref 6–20)
CALCIUM SERPL-MCNC: 8.9 MG/DL (ref 8.7–10.5)
CHLORIDE SERPL-SCNC: 107 MMOL/L (ref 95–110)
CHOLEST SERPL-MCNC: 171 MG/DL (ref 120–199)
CHOLEST/HDLC SERPL: 3.2 {RATIO} (ref 2–5)
CO2 SERPL-SCNC: 32 MMOL/L (ref 23–29)
CREAT SERPL-MCNC: 0.7 MG/DL (ref 0.5–1.4)
DIFFERENTIAL METHOD: ABNORMAL
EOSINOPHIL # BLD AUTO: 0.1 K/UL (ref 0–0.5)
EOSINOPHIL NFR BLD: 1.1 % (ref 0–8)
ERYTHROCYTE [DISTWIDTH] IN BLOOD BY AUTOMATED COUNT: 13 % (ref 11.5–14.5)
EST. GFR  (AFRICAN AMERICAN): >60 ML/MIN/1.73 M^2
EST. GFR  (NON AFRICAN AMERICAN): >60 ML/MIN/1.73 M^2
GLUCOSE SERPL-MCNC: 102 MG/DL (ref 70–110)
HCT VFR BLD AUTO: 43.9 % (ref 37–48.5)
HDLC SERPL-MCNC: 54 MG/DL (ref 40–75)
HDLC SERPL: 31.6 % (ref 20–50)
HGB BLD-MCNC: 13.6 G/DL (ref 12–16)
IMM GRANULOCYTES # BLD AUTO: 0.03 K/UL (ref 0–0.04)
IMM GRANULOCYTES NFR BLD AUTO: 0.5 % (ref 0–0.5)
LDLC SERPL CALC-MCNC: 102.4 MG/DL (ref 63–159)
LYMPHOCYTES # BLD AUTO: 2.1 K/UL (ref 1–4.8)
LYMPHOCYTES NFR BLD: 33.1 % (ref 18–48)
MCH RBC QN AUTO: 29.8 PG (ref 27–31)
MCHC RBC AUTO-ENTMCNC: 31 G/DL (ref 32–36)
MCV RBC AUTO: 96 FL (ref 82–98)
MONOCYTES # BLD AUTO: 0.5 K/UL (ref 0.3–1)
MONOCYTES NFR BLD: 8.4 % (ref 4–15)
NEUTROPHILS # BLD AUTO: 3.6 K/UL (ref 1.8–7.7)
NEUTROPHILS NFR BLD: 56.6 % (ref 38–73)
NONHDLC SERPL-MCNC: 117 MG/DL
NRBC BLD-RTO: 0 /100 WBC
PLATELET # BLD AUTO: 308 K/UL (ref 150–350)
PMV BLD AUTO: 10.4 FL (ref 9.2–12.9)
POTASSIUM SERPL-SCNC: 4.3 MMOL/L (ref 3.5–5.1)
PROT SERPL-MCNC: 6.6 G/DL (ref 6–8.4)
RBC # BLD AUTO: 4.56 M/UL (ref 4–5.4)
SODIUM SERPL-SCNC: 144 MMOL/L (ref 136–145)
TRIGL SERPL-MCNC: 73 MG/DL (ref 30–150)
VIT B12 SERPL-MCNC: 998 PG/ML (ref 210–950)
WBC # BLD AUTO: 6.41 K/UL (ref 3.9–12.7)

## 2020-03-11 PROCEDURE — 84425 ASSAY OF VITAMIN B-1: CPT

## 2020-03-11 PROCEDURE — 99999 PR PBB SHADOW E&M-EST. PATIENT-LVL IV: CPT | Mod: PBBFAC,,, | Performed by: NURSE PRACTITIONER

## 2020-03-11 PROCEDURE — 3008F BODY MASS INDEX DOCD: CPT | Mod: CPTII,S$GLB,, | Performed by: NURSE PRACTITIONER

## 2020-03-11 PROCEDURE — 3078F DIAST BP <80 MM HG: CPT | Mod: CPTII,S$GLB,, | Performed by: NURSE PRACTITIONER

## 2020-03-11 PROCEDURE — 3074F SYST BP LT 130 MM HG: CPT | Mod: CPTII,S$GLB,, | Performed by: NURSE PRACTITIONER

## 2020-03-11 PROCEDURE — 99213 PR OFFICE/OUTPT VISIT, EST, LEVL III, 20-29 MIN: ICD-10-PCS | Mod: S$GLB,,, | Performed by: NURSE PRACTITIONER

## 2020-03-11 PROCEDURE — 85025 COMPLETE CBC W/AUTO DIFF WBC: CPT

## 2020-03-11 PROCEDURE — 80053 COMPREHEN METABOLIC PANEL: CPT

## 2020-03-11 PROCEDURE — 99999 PR PBB SHADOW E&M-EST. PATIENT-LVL IV: ICD-10-PCS | Mod: PBBFAC,,, | Performed by: NURSE PRACTITIONER

## 2020-03-11 PROCEDURE — 99213 OFFICE O/P EST LOW 20 MIN: CPT | Mod: S$GLB,,, | Performed by: NURSE PRACTITIONER

## 2020-03-11 PROCEDURE — 80061 LIPID PANEL: CPT

## 2020-03-11 PROCEDURE — 3008F PR BODY MASS INDEX (BMI) DOCUMENTED: ICD-10-PCS | Mod: CPTII,S$GLB,, | Performed by: NURSE PRACTITIONER

## 2020-03-11 PROCEDURE — 3074F PR MOST RECENT SYSTOLIC BLOOD PRESSURE < 130 MM HG: ICD-10-PCS | Mod: CPTII,S$GLB,, | Performed by: NURSE PRACTITIONER

## 2020-03-11 PROCEDURE — 3078F PR MOST RECENT DIASTOLIC BLOOD PRESSURE < 80 MM HG: ICD-10-PCS | Mod: CPTII,S$GLB,, | Performed by: NURSE PRACTITIONER

## 2020-03-11 PROCEDURE — 82607 VITAMIN B-12: CPT

## 2020-03-11 NOTE — PROGRESS NOTES
BARIATRIC FOLLOW UP:    Chief Complaint   Patient presents with    Follow-up     LRNY 10/16/19     HISTORY OF PRESENT ILLNESS: Leora Steward is a 44 y.o. female with a Body mass index is 30.38 kg/m². who presents for follow up s/p lap RNY with Dr. Ulrich on 10/16/2019.  she is doing well and tolerating the liquid diet.  she is losing weight appropriately.  BGs stable. Denies hypoglycemia. Stopped DM meds 11/2019.    EXERCISE & VITAMINS:  Adherent with bariatric vitamin regimen   Exercise- exercise BID, 5 days a week    DIET:  Reg Bariatric Diet.  ~ 100 grams protein.  ~  40 oz H20 and Clear SF Liquids.    3 meals, 3 snacks (2 greek yogurt usually, 1protein shake)  Chicken   Seafood   Turkey       Review of Systems   Constitutional: Negative for chills, fever and malaise/fatigue.   Eyes: Negative for blurred vision and double vision.   Respiratory: Negative for cough, shortness of breath and wheezing.    Cardiovascular: Negative for chest pain and palpitations.   Gastrointestinal: Negative for abdominal pain, blood in stool, constipation, diarrhea, heartburn, nausea and vomiting.        Denies any dysphagia or globus sensation   Musculoskeletal: Negative for back pain, joint pain, myalgias and neck pain.   Neurological: Negative for dizziness, tingling and headaches.   Psychiatric/Behavioral: Negative for depression, substance abuse and suicidal ideas. The patient is not nervous/anxious.        Vitals:    03/11/20 0816   BP: 109/61   Pulse: 67       Physical Exam   Constitutional: She is oriented to person, place, and time. She appears well-developed and well-nourished.   HENT:   Head: Normocephalic and atraumatic.   Eyes: Conjunctivae and EOM are normal.   Neck: Neck supple.   Cardiovascular: Normal rate, regular rhythm and normal heart sounds.   Pulmonary/Chest: Effort normal. No respiratory distress.   Abdominal: Soft. Bowel sounds are normal. She exhibits no distension and no mass. There is no tenderness.  There is no guarding.   Musculoskeletal: Normal range of motion. She exhibits no edema.   Neurological: She is alert and oriented to person, place, and time.   Skin: Skin is warm and dry. Capillary refill takes less than 2 seconds.   Psychiatric: She has a normal mood and affect. Her behavior is normal.   Vitals reviewed.    ASSESSMENT:  - Morbid obesity, Body mass index is 30.38 kg/m².,  s/p laparoscopic Almas-en-Y on 10/16/2019.  - Estimated goal weight is 50% EWL  - Co-morbidities: Anxiety/depression, COPD, HTN, DM type 2  - Good Weight loss, 51 lbs, 59% EWL  - Great Exercise regimen  - Good Vitamin Regimen  - Good Diet    PLAN:   - Continue regular exercise and adherence to bariatric diet to achieve maximum weight loss.  - Discussed increased hydration.    - Full bariatric vitamin regimen  - Miralax daily for constipation, no fiber.  - No NSAIDs, Tylenol for pain.  - Can swallow whole pills.  - RTC per post op schedule.  - Call the office for any issues.  - Check labs as scheduled.    30 minute visit, over 50% of time spent counseling patient face to face on diet, exercise, and weight loss.

## 2020-03-13 LAB — VIT B1 BLD-MCNC: 80 UG/L (ref 38–122)

## 2020-10-13 ENCOUNTER — PATIENT MESSAGE (OUTPATIENT)
Dept: BARIATRICS | Facility: CLINIC | Age: 45
End: 2020-10-13

## 2020-10-16 ENCOUNTER — PATIENT MESSAGE (OUTPATIENT)
Dept: BARIATRICS | Facility: CLINIC | Age: 45
End: 2020-10-16

## 2021-01-21 ENCOUNTER — CLINICAL SUPPORT (OUTPATIENT)
Dept: URGENT CARE | Facility: CLINIC | Age: 46
End: 2021-01-21
Payer: COMMERCIAL

## 2021-01-21 VITALS — RESPIRATION RATE: 16 BRPM | HEART RATE: 73 BPM | OXYGEN SATURATION: 97 % | TEMPERATURE: 99 F

## 2021-01-21 DIAGNOSIS — R05.9 COUGH: Primary | ICD-10-CM

## 2021-01-21 LAB
CTP QC/QA: YES
SARS-COV-2 RDRP RESP QL NAA+PROBE: NEGATIVE

## 2021-01-21 PROCEDURE — U0002: ICD-10-PCS | Mod: QW,S$GLB,, | Performed by: NURSE PRACTITIONER

## 2021-01-21 PROCEDURE — U0002 COVID-19 LAB TEST NON-CDC: HCPCS | Mod: QW,S$GLB,, | Performed by: NURSE PRACTITIONER

## 2021-01-26 ENCOUNTER — CLINICAL SUPPORT (OUTPATIENT)
Dept: URGENT CARE | Facility: CLINIC | Age: 46
End: 2021-01-26
Payer: COMMERCIAL

## 2021-01-26 DIAGNOSIS — Z20.822 ENCOUNTER FOR LABORATORY TESTING FOR COVID-19 VIRUS: Primary | ICD-10-CM

## 2021-01-26 LAB
CTP QC/QA: YES
SARS-COV-2 RDRP RESP QL NAA+PROBE: NEGATIVE

## 2021-01-26 PROCEDURE — U0002 COVID-19 LAB TEST NON-CDC: HCPCS | Mod: QW,S$GLB,, | Performed by: PHYSICIAN ASSISTANT

## 2021-01-26 PROCEDURE — U0002: ICD-10-PCS | Mod: QW,S$GLB,, | Performed by: PHYSICIAN ASSISTANT

## 2021-01-26 NOTE — PROGRESS NOTES
CDC Testing and Quarantine Guidelines for Exposure:    A close exposure is defined as anyone who had a masked or an unmasked exposure to a known COVID -19 positive person, at less than 6 ft for more than 15 minutes. If your exposure meets this definition, then you are required to quarantine for 7-10 days per the CDC. They now recommend that a test can be performed if you are asymptomatic (someone who does not have any symptoms), and a test should be done if you develop symptoms after an exposure as described above.         If you meet the definition of a close exposure, it does not matter whether or not you are asymptomatic or symptomatic - A NEGATIVE TEST DOES NOT GET YOU OUT OF 7-10 DAYS OF QUARANTINE!         Please note that if you are asymptomatic and wait more than 4 days to test after an exposure, you risk lengthening your quarantine. This is because if you test positive as an asymptomatic, your isolation is 10 days from the date of the positive test, not the date of exposure. So for example, if you test positive as an asymptomatic on day 7 from exposure, you have now extended your 7-10 day quarantine to a 14 day isolation.         If your exposure does not meet the above definition, you may return to your normal activities including social distancing, wearing masks, and frequent handwashing.

## 2021-01-26 NOTE — LETTER
[address] [facility], [facility] ? [place], [address] ? Phone [phone] ? Fax [phone]           Return to Work/School Status    Patient: Leora Steward  YOB: 1975   Date: 01/26/2021      [facility] has adopted CDCs time-based return to work/school strategy for persons with confirmed or suspected COVID19. [facility] does not recommend using a test-based strategy for returning to work/school after COVID-19 infection. [facility] has reported prolonged positive test results without evidence of infectiousness. At this time, positive specimens capable of producing disease have not been isolated more than 9 days after onset of illness.   Symptomatic persons with confirmed COVID-19 or suspected COVID-19 can return to work/school after:    At least 3 days (72 hours) have passed since recovery defined as resolution of fever without the use of fever-reducing medications AND improvement in respiratory symptoms (e.g., cough, shortness of breath)    AND, at least 10 days have passed since first positive test  Asymptomatic persons with confirmed COVID-19 can return to work after:   At least 10 days have passed since the positive laboratory test and the person remains asymptomatic.  More information about the science behind the symptom-based return to work/school can be found at: https://www.cdc.gov/coronavirus/2019-ncov/community/lfzrhxom-aezjnbfftwu-rbkqicqsx.html    Patient tested NEGATIVE for Covid 19.    Sincerely,    NURSE URGENT CARE, [facility]

## 2021-05-19 ENCOUNTER — PATIENT MESSAGE (OUTPATIENT)
Dept: BARIATRICS | Facility: CLINIC | Age: 46
End: 2021-05-19

## 2021-05-19 DIAGNOSIS — K59.00 CONSTIPATION, UNSPECIFIED CONSTIPATION TYPE: Primary | ICD-10-CM

## 2022-03-07 ENCOUNTER — PATIENT MESSAGE (OUTPATIENT)
Dept: BARIATRICS | Facility: CLINIC | Age: 47
End: 2022-03-07
Payer: COMMERCIAL

## 2022-04-29 ENCOUNTER — OFFICE VISIT (OUTPATIENT)
Dept: BARIATRICS | Facility: CLINIC | Age: 47
End: 2022-04-29
Payer: COMMERCIAL

## 2022-04-29 ENCOUNTER — LAB VISIT (OUTPATIENT)
Dept: LAB | Facility: HOSPITAL | Age: 47
End: 2022-04-29
Payer: COMMERCIAL

## 2022-04-29 VITALS
SYSTOLIC BLOOD PRESSURE: 135 MMHG | WEIGHT: 162.69 LBS | OXYGEN SATURATION: 95 % | BODY MASS INDEX: 28.82 KG/M2 | TEMPERATURE: 98 F | HEART RATE: 74 BPM | DIASTOLIC BLOOD PRESSURE: 75 MMHG

## 2022-04-29 DIAGNOSIS — I10 ESSENTIAL HYPERTENSION: ICD-10-CM

## 2022-04-29 DIAGNOSIS — E11.9 TYPE 2 DIABETES MELLITUS WITHOUT COMPLICATION, WITHOUT LONG-TERM CURRENT USE OF INSULIN: ICD-10-CM

## 2022-04-29 DIAGNOSIS — Z98.84 S/P GASTRIC BYPASS: ICD-10-CM

## 2022-04-29 DIAGNOSIS — Z98.84 S/P GASTRIC BYPASS: Primary | ICD-10-CM

## 2022-04-29 LAB
25(OH)D3+25(OH)D2 SERPL-MCNC: 27 NG/ML (ref 30–96)
ALBUMIN SERPL BCP-MCNC: 3.6 G/DL (ref 3.5–5.2)
ALP SERPL-CCNC: 80 U/L (ref 55–135)
ALT SERPL W/O P-5'-P-CCNC: 13 U/L (ref 10–44)
ANION GAP SERPL CALC-SCNC: 7 MMOL/L (ref 8–16)
AST SERPL-CCNC: 20 U/L (ref 10–40)
BASOPHILS # BLD AUTO: 0.03 K/UL (ref 0–0.2)
BASOPHILS NFR BLD: 0.5 % (ref 0–1.9)
BILIRUB SERPL-MCNC: 0.3 MG/DL (ref 0.1–1)
BUN SERPL-MCNC: 16 MG/DL (ref 6–20)
CALCIUM SERPL-MCNC: 9.1 MG/DL (ref 8.7–10.5)
CHLORIDE SERPL-SCNC: 107 MMOL/L (ref 95–110)
CHOLEST SERPL-MCNC: 178 MG/DL (ref 120–199)
CHOLEST/HDLC SERPL: 2.6 {RATIO} (ref 2–5)
CO2 SERPL-SCNC: 30 MMOL/L (ref 23–29)
CREAT SERPL-MCNC: 0.7 MG/DL (ref 0.5–1.4)
DIFFERENTIAL METHOD: ABNORMAL
EOSINOPHIL # BLD AUTO: 0.1 K/UL (ref 0–0.5)
EOSINOPHIL NFR BLD: 1.2 % (ref 0–8)
ERYTHROCYTE [DISTWIDTH] IN BLOOD BY AUTOMATED COUNT: 12.1 % (ref 11.5–14.5)
EST. GFR  (AFRICAN AMERICAN): >60 ML/MIN/1.73 M^2
EST. GFR  (NON AFRICAN AMERICAN): >60 ML/MIN/1.73 M^2
GLUCOSE SERPL-MCNC: 108 MG/DL (ref 70–110)
HCT VFR BLD AUTO: 39 % (ref 37–48.5)
HDLC SERPL-MCNC: 69 MG/DL (ref 40–75)
HDLC SERPL: 38.8 % (ref 20–50)
HGB BLD-MCNC: 12.4 G/DL (ref 12–16)
IMM GRANULOCYTES # BLD AUTO: 0.01 K/UL (ref 0–0.04)
IMM GRANULOCYTES NFR BLD AUTO: 0.2 % (ref 0–0.5)
IRON SERPL-MCNC: 87 UG/DL (ref 30–160)
LDLC SERPL CALC-MCNC: 84.4 MG/DL (ref 63–159)
LYMPHOCYTES # BLD AUTO: 1.9 K/UL (ref 1–4.8)
LYMPHOCYTES NFR BLD: 33.5 % (ref 18–48)
MCH RBC QN AUTO: 30.5 PG (ref 27–31)
MCHC RBC AUTO-ENTMCNC: 31.8 G/DL (ref 32–36)
MCV RBC AUTO: 96 FL (ref 82–98)
MONOCYTES # BLD AUTO: 0.5 K/UL (ref 0.3–1)
MONOCYTES NFR BLD: 7.9 % (ref 4–15)
NEUTROPHILS # BLD AUTO: 3.2 K/UL (ref 1.8–7.7)
NEUTROPHILS NFR BLD: 56.7 % (ref 38–73)
NONHDLC SERPL-MCNC: 109 MG/DL
NRBC BLD-RTO: 0 /100 WBC
PLATELET # BLD AUTO: 276 K/UL (ref 150–450)
PMV BLD AUTO: 9.9 FL (ref 9.2–12.9)
POTASSIUM SERPL-SCNC: 4 MMOL/L (ref 3.5–5.1)
PROT SERPL-MCNC: 6.3 G/DL (ref 6–8.4)
RBC # BLD AUTO: 4.07 M/UL (ref 4–5.4)
SATURATED IRON: 25 % (ref 20–50)
SODIUM SERPL-SCNC: 144 MMOL/L (ref 136–145)
TOTAL IRON BINDING CAPACITY: 343 UG/DL (ref 250–450)
TRANSFERRIN SERPL-MCNC: 232 MG/DL (ref 200–375)
TRIGL SERPL-MCNC: 123 MG/DL (ref 30–150)
VIT B12 SERPL-MCNC: 793 PG/ML (ref 210–950)
WBC # BLD AUTO: 5.67 K/UL (ref 3.9–12.7)

## 2022-04-29 PROCEDURE — 3008F BODY MASS INDEX DOCD: CPT | Mod: CPTII,S$GLB,, | Performed by: NURSE PRACTITIONER

## 2022-04-29 PROCEDURE — 3075F SYST BP GE 130 - 139MM HG: CPT | Mod: CPTII,S$GLB,, | Performed by: NURSE PRACTITIONER

## 2022-04-29 PROCEDURE — 1159F PR MEDICATION LIST DOCUMENTED IN MEDICAL RECORD: ICD-10-PCS | Mod: CPTII,S$GLB,, | Performed by: NURSE PRACTITIONER

## 2022-04-29 PROCEDURE — 1160F PR REVIEW ALL MEDS BY PRESCRIBER/CLIN PHARMACIST DOCUMENTED: ICD-10-PCS | Mod: CPTII,S$GLB,, | Performed by: NURSE PRACTITIONER

## 2022-04-29 PROCEDURE — 3008F PR BODY MASS INDEX (BMI) DOCUMENTED: ICD-10-PCS | Mod: CPTII,S$GLB,, | Performed by: NURSE PRACTITIONER

## 2022-04-29 PROCEDURE — 1160F RVW MEDS BY RX/DR IN RCRD: CPT | Mod: CPTII,S$GLB,, | Performed by: NURSE PRACTITIONER

## 2022-04-29 PROCEDURE — 3078F PR MOST RECENT DIASTOLIC BLOOD PRESSURE < 80 MM HG: ICD-10-PCS | Mod: CPTII,S$GLB,, | Performed by: NURSE PRACTITIONER

## 2022-04-29 PROCEDURE — 84466 ASSAY OF TRANSFERRIN: CPT | Performed by: NURSE PRACTITIONER

## 2022-04-29 PROCEDURE — 99999 PR PBB SHADOW E&M-EST. PATIENT-LVL IV: CPT | Mod: PBBFAC,,, | Performed by: NURSE PRACTITIONER

## 2022-04-29 PROCEDURE — 3078F DIAST BP <80 MM HG: CPT | Mod: CPTII,S$GLB,, | Performed by: NURSE PRACTITIONER

## 2022-04-29 PROCEDURE — 84425 ASSAY OF VITAMIN B-1: CPT | Performed by: NURSE PRACTITIONER

## 2022-04-29 PROCEDURE — 80053 COMPREHEN METABOLIC PANEL: CPT | Performed by: NURSE PRACTITIONER

## 2022-04-29 PROCEDURE — 85025 COMPLETE CBC W/AUTO DIFF WBC: CPT | Performed by: NURSE PRACTITIONER

## 2022-04-29 PROCEDURE — 1159F MED LIST DOCD IN RCRD: CPT | Mod: CPTII,S$GLB,, | Performed by: NURSE PRACTITIONER

## 2022-04-29 PROCEDURE — 99999 PR PBB SHADOW E&M-EST. PATIENT-LVL IV: ICD-10-PCS | Mod: PBBFAC,,, | Performed by: NURSE PRACTITIONER

## 2022-04-29 PROCEDURE — 82306 VITAMIN D 25 HYDROXY: CPT | Performed by: NURSE PRACTITIONER

## 2022-04-29 PROCEDURE — 82607 VITAMIN B-12: CPT | Performed by: NURSE PRACTITIONER

## 2022-04-29 PROCEDURE — 3075F PR MOST RECENT SYSTOLIC BLOOD PRESS GE 130-139MM HG: ICD-10-PCS | Mod: CPTII,S$GLB,, | Performed by: NURSE PRACTITIONER

## 2022-04-29 PROCEDURE — 99214 PR OFFICE/OUTPT VISIT, EST, LEVL IV, 30-39 MIN: ICD-10-PCS | Mod: S$GLB,,, | Performed by: NURSE PRACTITIONER

## 2022-04-29 PROCEDURE — 80061 LIPID PANEL: CPT | Performed by: NURSE PRACTITIONER

## 2022-04-29 PROCEDURE — 99214 OFFICE O/P EST MOD 30 MIN: CPT | Mod: S$GLB,,, | Performed by: NURSE PRACTITIONER

## 2022-04-29 RX ORDER — TRAZODONE HYDROCHLORIDE 50 MG/1
50 TABLET ORAL NIGHTLY
COMMUNITY

## 2022-04-29 RX ORDER — BUPROPION HYDROCHLORIDE 150 MG/1
150 TABLET ORAL DAILY
COMMUNITY

## 2022-04-29 RX ORDER — ALPRAZOLAM 0.5 MG/1
0.5 TABLET ORAL
COMMUNITY
End: 2023-02-15

## 2022-04-29 NOTE — PATIENT INSTRUCTIONS
Meal Ideas for Regular Bariatric Diet  *Recipes and products available at [email or web address]      Breakfast: (15-20g protein)    - Egg white omelet: 2 egg whites or ½ cup Egg Beaters. (Optional proteins: cheese, shrimp, black beans, chicken, sliced turkey) (Optional veggies: tomatoes, salsa, spinach, mushrooms, onions, green peppers, or small slice avocado)     - Egg and sausage: 1 egg or ¼ cup Egg Beaters (any variety), with 1 brock or 2 links of Turkey sausage or Veggie breakfast sausage (AdverseEvents or MagicEvent)    - Crust-less breakfast quiche: To make a glass pie dish, mix 4oz part skim Ricotta, 1 cup skim milk, and 2 eggs as your base. Add protein: shredded cheese, sliced lean ham or turkey, turkey wing/sausage. Add veggies: tomato, onion, green onion, mushroom, green pepper, spinach, etc.    - Yogurt parfait: Mix 1 - 6oz container Dannon Light N Fit vanilla yogurt, with ¼ cup crushed unsalted nuts    - Cottage cheese and fruit: ½ cup part-skim cottage cheese or ricotta cheese topped with fresh fruit or sugar free preserves     - Adia Moncada's Vanilla Egg custard* (add 2 Tbsp instant coffee granules to make Cappuccino Custard*)    - Hi-Protein café latte (skim milk, decaf coffee, 1 scoop protein powder). Optional to add Sugar free syrup or extract flavoring.    - Breakfast Lox: spread fat free cream cheese on slices of smoked salmon. Serve over scrambled or egg over easy (sauteed with nonstick cookspray) OR on a cucumber slice    - Eggwhich: Scramble or cook 1 large egg over easy using nonstick cookspray. Place between 2 slices of [place] wing and low fat cheese.     Lunch: (20-30g protein)    - ½ cup Black bean soup (Homemade or Progresso), with ¼ cup shredded low-fat cheese. Top with chopped tomato or fresh salsa.     - Lean deli turkey breast and low-fat sliced cheese, mustard or light bridges to moisten, rolled up together, or wrapped in a César lettuce leaf    - Chicken salad made from dinner  leftovers, moisten with low-fat salad dressing or light bridges. Also try leftover salmon, shrimp, tuna or boiled eggs. Serve ½ cup over dark green salad    - Fat-free canned refried beans, topped with ¼ cup shredded low-fat cheese. Top with chopped tomato or fresh salsa.     - Greek salad: Top mixed greens with 1-2oz grilled chicken, tomatoes, red onions, 2-3 kalamata olives, and sprinkle lightly with feta cheese. Spritz with Balsamic vinegar to taste.     - Crust-less lunch quiche: To make a glass pie dish, mix 4oz part skim Ricotta, 1 cup skim milk, and 2 eggs as your base. Add protein: shredded cheese, sliced lean ham or turkey, shrimp, chicken. Add veggies: tomato, onion, green onion, mushroom, green pepper, spinach, artichoke, broccoli, etc.    - Pizza bake: spread a  tobi brandie mushroom with tomato sauce, low-fat shredded mozzarella and turkey pepperoni or [place] wing. Add any veggies. Roast for 10-15 minutes, until cheese melted.     - Cucumber crab bites: Spread ¼ cup crab dip (lump crabmeat + light cream cheese and green onions) over sliced cucumber.     - Chicken with light spinach and artichoke dip*: Puree in : 6oz cooked and drained spinach, 2 cloves garlic, 1 can cannelloni beans, ½ cup chopped green onions, 1 can drained artichoke hearts (not marinated in oil), lemon juice and basil. Mix in 2oz chopped up chicken.    Supper: (20-30g protein)    - Serve grilled fish over dark green salad tossed with low-fat dressing, served with grilled asparagus mccrary     - Rotisserie chicken salad: served with sliced strawberries, walnuts, fat-free feta cheese crumbles and 1 tbsp Ferriss Own Light Raspberry [place] Vinaigrette    - Shrimp cocktail: Dip cold boiled shrimp in homemade low-sugar cocktail sauce (1/2 cup Bertrand One Carb ketchup, 2 tbsp horseradish, 1/4 tsp hot sauce, 1 tsp Worcestershire sauce, 1 tbsp freshly-squeezed lemon juice). Serve with dark green salad, walnuts, and crumbled blue  cheese drizzled with olive oil and Balsamic vinegar    - Tuna Melt: Spread tuna salad onto 2 thick slices of tomato. Top with low-fat cheese and broil until cheese is melted. May also be made with chicken salad of shrimp salad. [place] with different types of cheeses.    - Chicken or beef fajitas (no tortilla, rice, beans, chips). Top meat and veggies w/ fresh salsa, fat free sour cream.     - Homemade low-fat Chili using extra lean ground beef or ground turkey. Top with shredded cheese and salsa as desired. May add dollop fat-free sour cream if desired    - Chicken parmesan: Top chicken breast w/ low sugar marinara sauce, mozzarella cheese and bake until chicken reaches 165*.  Serve w/ spaghetti SQUASH or Gren[place] cut green beans    - Dinner Omelet with shrimp or chicken and onion, green peppers and chives.    - No noodle lasagna: Use sliced zucchini or eggplant in place of noodles.  Layer with part skim ricotta cheese and low sugar meat sauce (use very lean ground beef or ground turkey).    - Mexican chicken bake: Bake chunks of chicken breast or thigh with taco seasoning, Pace brand enchilada sauce, green onions and low-fat cheese. Serve with ¼ cup black beans or fat free refried beans topped with chopped tomatoes or salsa.    - Jessica frozen meatballs, simmered in Classico Marinara sauce. Different flavors of salsa or spaghetti sauce create different dishes! Sprinkle with parmesan cheese. Serve with grilled or steamed veggies, or a dark green salad.    - Simmer boneless skinless chicken thigh chunks in Classico Marinara sauce or roasted salsa until tender with chopped onion, bell pepper, garlic, mushrooms, spinach, etc.     - Hamburger or veggie burger, without the bun, dressed the way you like. Served with grilled or steamed veggies.    - Eggplant parmesan: Bake slices of eggplant at 350 degrees for 15 minutes. Layer tomato sauce, sliced eggplant and low-fat mozzarella cheese in a baking dish and cover with  foil. Bake 30-40 more minutes or until bubbly. Uncover and bake at 400 degrees for about 15 more minutes, or until top is slightly crisp.    - Fish tacos: grilled/baked white fish, wrapped in César lettuce leaf, topped with salsa, shredded low-fat cheese, and light coleslaw.    - Chicken christy: Sprinkle chicken w/ 1 tsp of hidden valley ranch dip mix. Then grill chicken and top with black beans, salsa and 1 tsp fat free sour cream.     - Cauliflower pizza crust: Use cauliflower as crust (see recipe on pinterest, no flour!). Top w/ low fat cheese, turkey pepperoni and veggies and bake again    - chicken or turkey crust pizza: use ground chicken or turkey instead of cauliflower, spread in [place] and bake at 350 for about 20-30 minutes(may want to add garlic, black pepper, oregano and other herbs to ground meat mixture).  Remove and top w/ low fat cheese, turkey pepperoni and veggies and bake again for another 10 minutes or until cheese is browned.     Snacks: (100-200 calories; >5g protein)    - 1 low-fat cheese stick with 8 cherry tomatoes or 1 serving fresh fruit  - 4 thin slices fat-free turkey breast and 1 slice low-fat cheese  - 4 thin slices fat-free honey ham with wedge of melon  - 6-8 edamame pods (equivalent to about 1/4 cup edamame without pods).   - 1/4 cup unsalted nuts with ½ cup fruit  - 6-oz container Dannon Light n Fit vanilla yogurt, topped with 1oz unsalted nuts         - apple, celery or baby carrots spread with 2 Tbsp PB2  - apple slices with 1 oz slice low-fat cheese  - Apple slices dipped in 2 Tbsp of PB2  - celery, cucumber, bell pepper or baby carrots dipped in ¼ cup hummus bean spread or light spinach and artichoke dip (*recipe in lunch section)  - celery, cucumber, baby carrots dipped in high protein greek yogurt (Mix 16 oz plain greek yogurt + 1 packet of hidden valley ranch dip mix)  - Cisco Links Beef Steak - 14g protein! (similar to beef jerky)  - 2 wedges Laughing Cow - Light Herb  & Garlic Cheese with sliced cucumber or green bell pepper  - 1/2 cup low-fat cottage cheese with ¼ cup fruit or ¼ cup salsa  - RTD Protein drinks: Atkins, Low Carb Slim Fast, EAS light, Muscle Milk Light, etc.  - Homemade Protein drinks: GNC Soy95, Isopure, Nectar, UNJURY, Whey Gourmet, etc. Mix 1 scoop powder with 8oz skim/1% milk or light soymilk.  - Protein bars: Atkins, EAS, Pure Protein, Think Thin, Detour, etc. Must have 0-4 grams sugar - Read the label.    Takeout Options: No more than twice/week  Deli - Salads (no pasta or rice), meats, cheeses. Roasted chicken. Lox (salmon)    Mexican - Platters which don't include tortillas, chips, or rice. Go easy on the beans. Example: Fajitas without the tortillas. Ask the  not to bring chips to the table if they are too tempting.    Greek - Meat or fish and vegetable, but no bread or rice. Including hummus, baba ganoush, etc, is OK. Most sit-down Greek restaurants can provide you with cucumber slices for dipping instead of isaac bread.    Fast Food (Avoid as much as possible) - Salads (no croutons and limit salad dressing to 2 tbsp), grilled chicken sandwich without the bun and ask for no bridges. Sashas low fat chili or Taco Bell pintos and cheese.    BBQ - The meats are fine if you ask for sauces on the side, but most of the traditional side dishes are loaded with carbs. Mele slaw, baked beans and BBQ sauce are typically made with sugar.    Chinese - Nothing deep-fried, no rice or noodles. Many Chinese sauces have starch and sugar in them, so you'll have to use your judgement. If you find that these sauces trigger cravings, or cause Dumping, you can ask for the sauce to be made without sugar or just use soy sauce.

## 2022-04-29 NOTE — PROGRESS NOTES
BARIATRIC POST-OPERATIVE FOLLOW UP:    HPI:  46 y.o.-year-old female presents for 2 year follow up .    Denies: nausea, vomiting, abdominal pain, changes in bowel movement pattern, fever, chills, dysphagia, chest pain, and shortness of breath.    Had derm procedure for rashes and on medication that have not helping.     Pt off diabetic, HTN and cholesterol medication.     ROS:    Review of Systems   Constitutional: Negative for activity change and fatigue.   Respiratory: Negative for cough and shortness of breath.    Cardiovascular: Negative for chest pain, palpitations and leg swelling.   Gastrointestinal: Negative for abdominal pain, nausea and vomiting.   Endocrine: Negative for polydipsia, polyphagia and polyuria.   Genitourinary: Negative for dysuria.   Musculoskeletal: Negative for gait problem.   Skin: Negative for rash.   Allergic/Immunologic: Negative for immunocompromised state.   Neurological: Negative for dizziness, syncope and weakness.   Hematological: Does not bruise/bleed easily.   Psychiatric/Behavioral: Negative for behavioral problems.       EXERCISE:  Gym 4 x week     VITAMINS:  Tolerating well     MEDICATIONS/ALLERGIES:  Have been reviewed.    DIET:  Bariatric Regular Diet   Protein ~ not counting   Fluids ~ 32 ozs    PHYSICAL EXAM:  GENERAL: 46 y.o.-year-old female in NAD, A&O x3  VITAL SIGNS:  BP: 135/75  CHEST: Clear to auscultation, regular effort.  HEART: Regular rate and rhythm. No murmurs, clicks, or gallops.  ABDOMEN: Bowel sounds present in all four quadrants. Soft, non-tender, non-distended. Well-healing surgical scars are clean, dry, and intact without signs of infection or bleeding.  EXTREMITIES: No clubbing, cyanosis, or edema.      ASSESSMENT:  - Morbid obesity s/p laparoscopic Almas-en-Y on 10/16/2019.  - Co-morbidities: diabetes mellitus and hypertension  - Weight loss 60 lbs 69%EWL  - Exercise regimen good   - Diet good     PLAN:  - Emphasized the importance of regular exercise  and adherence to bariatric diet to achieve maximum weight loss.  - Encouraged patient to begin OR continue regular exercise.  - Follow-up with dietician to reinforce diet.  - Continue daily vitamins and medications.  - RTC in 12 months or sooner if needed.  - Call the office for any issues.  - Check labs today.  - Seeing GI for constipation   - Referral to Plastic surgery

## 2022-05-05 ENCOUNTER — PATIENT MESSAGE (OUTPATIENT)
Dept: BARIATRICS | Facility: CLINIC | Age: 47
End: 2022-05-05
Payer: COMMERCIAL

## 2022-05-05 LAB — VIT B1 BLD-MCNC: 77 UG/L (ref 38–122)

## 2022-05-10 ENCOUNTER — PATIENT MESSAGE (OUTPATIENT)
Dept: BARIATRICS | Facility: CLINIC | Age: 47
End: 2022-05-10
Payer: COMMERCIAL

## 2022-06-10 ENCOUNTER — PATIENT MESSAGE (OUTPATIENT)
Dept: BARIATRICS | Facility: CLINIC | Age: 47
End: 2022-06-10
Payer: COMMERCIAL

## 2022-06-14 ENCOUNTER — PATIENT MESSAGE (OUTPATIENT)
Dept: BARIATRICS | Facility: CLINIC | Age: 47
End: 2022-06-14
Payer: COMMERCIAL

## 2022-07-05 ENCOUNTER — PATIENT MESSAGE (OUTPATIENT)
Dept: BARIATRICS | Facility: CLINIC | Age: 47
End: 2022-07-05
Payer: COMMERCIAL

## 2022-08-02 ENCOUNTER — PATIENT MESSAGE (OUTPATIENT)
Dept: BARIATRICS | Facility: CLINIC | Age: 47
End: 2022-08-02
Payer: COMMERCIAL

## 2022-08-04 ENCOUNTER — PATIENT MESSAGE (OUTPATIENT)
Dept: BARIATRICS | Facility: CLINIC | Age: 47
End: 2022-08-04
Payer: COMMERCIAL

## 2022-09-07 ENCOUNTER — PATIENT MESSAGE (OUTPATIENT)
Dept: BARIATRICS | Facility: CLINIC | Age: 47
End: 2022-09-07
Payer: COMMERCIAL

## 2022-09-30 ENCOUNTER — HOSPITAL ENCOUNTER (EMERGENCY)
Facility: HOSPITAL | Age: 47
Discharge: HOME | End: 2022-09-30
Attending: EMERGENCY MEDICINE
Payer: COMMERCIAL

## 2022-09-30 VITALS
WEIGHT: 162 LBS | TEMPERATURE: 98 F | BODY MASS INDEX: 28.7 KG/M2 | OXYGEN SATURATION: 98 % | DIASTOLIC BLOOD PRESSURE: 87 MMHG | RESPIRATION RATE: 18 BRPM | HEART RATE: 84 BPM | SYSTOLIC BLOOD PRESSURE: 141 MMHG | HEIGHT: 63 IN
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
## 2022-09-30 PROCEDURE — 99284 EMERGENCY DEPT VISIT MOD MDM: CPT | Mod: ER

## 2022-09-30 RX ORDER — METHOCARBAMOL 500 MG/1
500 TABLET, FILM COATED ORAL 2 TIMES DAILY PRN
Qty: 15 TABLET | Refills: 0 | Status: SHIPPED | OUTPATIENT
Start: 2022-09-30 | End: 2022-10-05

## 2022-09-30 RX ORDER — NAPROXEN 500 MG/1
500 TABLET ORAL 2 TIMES DAILY WITH MEALS
Qty: 20 TABLET | Refills: 0 | Status: SHIPPED | OUTPATIENT
Start: 2022-09-30 | End: 2023-02-15 | Stop reason: CLARIF
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
## 2022-10-06 ENCOUNTER — PATIENT MESSAGE (OUTPATIENT)
Dept: BARIATRICS | Facility: CLINIC | Age: 47
End: 2022-10-06
Payer: COMMERCIAL

## 2022-11-04 ENCOUNTER — PATIENT MESSAGE (OUTPATIENT)
Dept: BARIATRICS | Facility: CLINIC | Age: 47
End: 2022-11-04
Payer: COMMERCIAL

## 2022-11-23 ENCOUNTER — OFFICE VISIT (OUTPATIENT)
Dept: PLASTIC SURGERY | Facility: CLINIC | Age: 47
End: 2022-11-23
Payer: COMMERCIAL

## 2022-11-23 VITALS
HEIGHT: 63 IN | DIASTOLIC BLOOD PRESSURE: 69 MMHG | WEIGHT: 158.75 LBS | SYSTOLIC BLOOD PRESSURE: 122 MMHG | HEART RATE: 66 BPM | BODY MASS INDEX: 28.13 KG/M2

## 2022-11-23 DIAGNOSIS — M54.9 CHRONIC UPPER BACK PAIN: ICD-10-CM

## 2022-11-23 DIAGNOSIS — M54.2 CHRONIC NECK PAIN: Primary | ICD-10-CM

## 2022-11-23 DIAGNOSIS — N62 MACROMASTIA: ICD-10-CM

## 2022-11-23 DIAGNOSIS — G89.29 CHRONIC NECK PAIN: Primary | ICD-10-CM

## 2022-11-23 DIAGNOSIS — G89.29 CHRONIC UPPER BACK PAIN: ICD-10-CM

## 2022-11-23 PROCEDURE — 1159F PR MEDICATION LIST DOCUMENTED IN MEDICAL RECORD: ICD-10-PCS | Mod: CPTII,S$GLB,, | Performed by: SURGERY

## 2022-11-23 PROCEDURE — 3008F BODY MASS INDEX DOCD: CPT | Mod: CPTII,S$GLB,, | Performed by: SURGERY

## 2022-11-23 PROCEDURE — 99203 OFFICE O/P NEW LOW 30 MIN: CPT | Mod: S$GLB,,, | Performed by: SURGERY

## 2022-11-23 PROCEDURE — 3078F DIAST BP <80 MM HG: CPT | Mod: CPTII,S$GLB,, | Performed by: SURGERY

## 2022-11-23 PROCEDURE — 99999 PR PBB SHADOW E&M-EST. PATIENT-LVL IV: ICD-10-PCS | Mod: PBBFAC,,, | Performed by: SURGERY

## 2022-11-23 PROCEDURE — 99999 PR PBB SHADOW E&M-EST. PATIENT-LVL IV: CPT | Mod: PBBFAC,,, | Performed by: SURGERY

## 2022-11-23 PROCEDURE — 1160F RVW MEDS BY RX/DR IN RCRD: CPT | Mod: CPTII,S$GLB,, | Performed by: SURGERY

## 2022-11-23 PROCEDURE — 1160F PR REVIEW ALL MEDS BY PRESCRIBER/CLIN PHARMACIST DOCUMENTED: ICD-10-PCS | Mod: CPTII,S$GLB,, | Performed by: SURGERY

## 2022-11-23 PROCEDURE — 99203 PR OFFICE/OUTPT VISIT, NEW, LEVL III, 30-44 MIN: ICD-10-PCS | Mod: S$GLB,,, | Performed by: SURGERY

## 2022-11-23 PROCEDURE — 3074F PR MOST RECENT SYSTOLIC BLOOD PRESSURE < 130 MM HG: ICD-10-PCS | Mod: CPTII,S$GLB,, | Performed by: SURGERY

## 2022-11-23 PROCEDURE — 3078F PR MOST RECENT DIASTOLIC BLOOD PRESSURE < 80 MM HG: ICD-10-PCS | Mod: CPTII,S$GLB,, | Performed by: SURGERY

## 2022-11-23 PROCEDURE — 3008F PR BODY MASS INDEX (BMI) DOCUMENTED: ICD-10-PCS | Mod: CPTII,S$GLB,, | Performed by: SURGERY

## 2022-11-23 PROCEDURE — 3074F SYST BP LT 130 MM HG: CPT | Mod: CPTII,S$GLB,, | Performed by: SURGERY

## 2022-11-23 PROCEDURE — 1159F MED LIST DOCD IN RCRD: CPT | Mod: CPTII,S$GLB,, | Performed by: SURGERY

## 2022-11-23 RX ORDER — LISDEXAMFETAMINE DIMESYLATE 40 MG/1
40 CAPSULE ORAL DAILY
COMMUNITY
End: 2023-02-15

## 2022-11-23 NOTE — LETTER
[facility] 2nd Floor  [address]  [place] [address]  Phone: [phone]  Fax: [phone] December 21, 2022        Melanie Alcantara MD  [address] [facility], 2nd floor, AT  [facility] [address]    Patient: Leora Steward   MR Number: [id number]   YOB: 1975   Date of Visit: 11/23/2022     Dear Dr. Alcantara:    Thank you for referring Leora Steward to me for evaluation. Attached are the relevant portions of my assessment and plan of care.    If you have questions, please do not hesitate to call me. I look forward to following Leora along with you.    Sincerely,    Cabrera Bardales MD   Section of Plastic Surgery  Department of Surgery  [facility]    CRB/hcr

## 2022-11-23 NOTE — PROGRESS NOTES
History & Physical    SUBJECTIVE:   Chief complaint: large breasts    History of Present Illness:    Leora Steward presents to [facility] 2ND FLOOR on 2022 for evaluation for bilateral breast reduction secondary to symptomatic large pannus. She has a chief complaint of chronic back pain for years. She has tried NSAIDs with or without alleviation of pain. She had gastric bypass in 2019 and lost 100 lbs. Her weigh has been stable. She also complains of  macerating rashes below pannus that have not significantly improved with application of medicated ointments and creams.  She currently reports weight loss of 100lbs.  She denies smoking tobacco or the use of any nicotine containing products.       Past Medical History:   Diagnosis Date    Anxiety     Arthritis     COPD (chronic obstructive pulmonary disease)     Depression     Diabetes mellitus     Hypertension     Neuromuscular disorder        Past Surgical History:   Procedure Laterality Date    CHOLECYSTECTOMY      HYSTERECTOMY      full    LAPAROSCOPIC GASTROENTEROSTOMY N/A 10/16/2019    Procedure: GASTROENTEROSTOMY, LAPAROSCOPIC, with intraop EGD;  Surgeon: Edmundo Ulrich MD;  Location: [facility] OR [address] [place] [address];  Service: General;  Laterality: N/A;    TUBAL LIGATION         Family History   Problem Relation Age of Onset    Cancer Mother         lung    Diabetes Father     Hypertension Father     Diabetes Sister     Diabetes Brother     No Known Problems Daughter     No Known Problems Son     Thyroid disease Sister     No Known Problems Sister     No Known Problems Sister        Social History     Socioeconomic History    Marital status:    Tobacco Use    Smoking status: Former     Packs/day: 1.50     Types: Cigarettes     Quit date:      Years since quittin.8    Smokeless tobacco: Never   Substance and Sexual Activity    Alcohol use: Not Currently    Drug use: Never    Sexual activity: Yes     Partners: Male     Birth  "control/protection: See Surgical Hx       Current Outpatient Medications   Medication Sig Dispense Refill    ALPRAZolam (XANAX) 0.5 MG tablet Take 0.5 mg by mouth as needed.      b complex vitamins tablet Take 1 tablet by mouth once daily. Vitamin B 12 1200 mcg sublingual   No B1      buPROPion (WELLBUTRIN XL) 150 MG TB24 tablet Take 150 mg by mouth once daily.      CALCIUM CITRATE ORAL Take 1 tablet by mouth 3 (three) times daily.      lamoTRIgine (LAMICTAL) 25 MG tablet Take 25 mg by mouth 2 (two) times daily.       lisdexamfetamine (VYVANSE) 40 MG Cap Take 40 mg by mouth once daily.      montelukast (SINGULAIR) 10 mg tablet       multivitamin (THERAGRAN) per tablet Take 1 tablet by mouth 2 (two) times daily.      naproxen (NAPROSYN) 500 MG tablet Take 1 tablet (500 mg total) by mouth 2 (two) times daily with meals. 20 tablet 0    polyethylene glycol (GLYCOLAX) 17 gram/dose powder Mix 1 capful (17 g) with fluids and take by mouth once daily. 510 g 3    thiamine (VITAMIN B-1) 50 MG tablet Take 50 mg by mouth once daily.      traZODone (DESYREL) 50 MG tablet Take 50 mg by mouth every evening.      venlafaxine (EFFEXOR-XR) 150 MG Cp24 Take 150 mg by mouth once daily.       gabapentin (NEURONTIN) 300 MG capsule Take 300 mg by mouth 3 (three) times daily.      ondansetron (ZOFRAN-ODT) 8 MG TbDL Dissolve 1 tablet (8 mg total) by mouth every 6 (six) hours as needed. 30 tablet 0     No current facility-administered medications for this visit.       Review of patient's allergies indicates:  No Known Allergies      Review of Systems:    Review of Systems   Abdomen: large panus with excessive skin and subq tissue    Musculoskeletal: Positive for back pain.   Neurological: Negative for headaches or dizziness      OBJECTIVE:     /69 (BP Location: Left arm, Patient Position: Sitting, BP Method: Medium (Automatic))   Pulse 66   Ht 5' 3" (1.6 m)   Wt 72 kg (158 lb 11.7 oz)   BMI 28.12 kg/m²       Physical " Exam:    Physical Exam   Constitutional: She is oriented to person, place, and time. She appears well-developed and well-nourished.   Neck: Normal range of motion. Neck supple. No tracheal deviation present.   Cardiovascular: Normal rate, regular rhythm and normal heart sounds.    Pulmonary/Chest: Effort normal and breath sounds normal, no palpable masses,   Abdominal: Soft. Large Pannus,  evidence of previous rashes, Bowel sounds are normal.   Musculoskeletal: Normal range of motion.   Neurological: She is alert and oriented to person, place, and time.   Skin: Skin is warm.         ASSESSMENT/PLAN:     Pannus  2. Chronic back pain  3. Chronic rashes    PLAN:    -Will submit paper work for insurance approval  -Photos obtained  -Risk, benefits, and alternatives explained. She understands that the risks include but are not limited to bleeding, scarring, infection, pain, numbness, asymmetry, deformity, open wound, skin necrosis, wound dehiscence, permanent or temporary loss of sensation, poor cosmetic outcome, hematoma, seroma and pulmonary emobolus.   - Patient would like to proceed with scheduling bilateral breast reduction pending insurance authorization    All questions were answered. The patient was advised to call the clinic with any questions or concerns prior to their next visit.

## 2022-12-07 ENCOUNTER — PATIENT MESSAGE (OUTPATIENT)
Dept: BARIATRICS | Facility: CLINIC | Age: 47
End: 2022-12-07
Payer: COMMERCIAL

## 2023-01-09 ENCOUNTER — PATIENT MESSAGE (OUTPATIENT)
Dept: BARIATRICS | Facility: CLINIC | Age: 48
End: 2023-01-09
Payer: COMMERCIAL

## 2023-02-01 ENCOUNTER — PATIENT MESSAGE (OUTPATIENT)
Dept: PLASTIC SURGERY | Facility: CLINIC | Age: 48
End: 2023-02-01
Payer: COMMERCIAL

## 2023-02-03 DIAGNOSIS — E65 PANNUS, ABDOMINAL: Primary | ICD-10-CM

## 2023-02-09 ENCOUNTER — PATIENT MESSAGE (OUTPATIENT)
Dept: BARIATRICS | Facility: CLINIC | Age: 48
End: 2023-02-09
Payer: COMMERCIAL

## 2023-02-14 ENCOUNTER — PATIENT MESSAGE (OUTPATIENT)
Dept: BARIATRICS | Facility: CLINIC | Age: 48
End: 2023-02-14
Payer: COMMERCIAL

## 2023-02-15 RX ORDER — LISDEXAMFETAMINE DIMESYLATE 50 MG/1
50 CAPSULE ORAL
COMMUNITY
Start: 2022-11-30 | End: 2023-02-15 | Stop reason: CLARIF

## 2023-02-15 RX ORDER — DEXAMETHASONE 4 MG/1
TABLET ORAL
COMMUNITY
End: 2023-02-15 | Stop reason: CLARIF

## 2023-02-15 RX ORDER — AMOXICILLIN 875 MG/1
TABLET, FILM COATED ORAL
COMMUNITY
End: 2023-02-15 | Stop reason: CLARIF

## 2023-02-15 RX ORDER — AMOXICILLIN 875 MG/1
875 TABLET, FILM COATED ORAL EVERY 12 HOURS
COMMUNITY
Start: 2022-09-25 | End: 2023-02-15 | Stop reason: CLARIF

## 2023-02-15 RX ORDER — BETAMETHASONE SODIUM PHOSPHATE AND BETAMETHASONE ACETATE 3; 3 MG/ML; MG/ML
INJECTION, SUSPENSION INTRA-ARTICULAR; INTRALESIONAL; INTRAMUSCULAR; SOFT TISSUE
COMMUNITY
End: 2023-02-15 | Stop reason: CLARIF

## 2023-02-15 RX ORDER — METHOCARBAMOL 500 MG/1
500 TABLET, FILM COATED ORAL 4 TIMES DAILY PRN
COMMUNITY
Start: 2022-10-05 | End: 2023-02-15 | Stop reason: CLARIF

## 2023-02-15 RX ORDER — CARIPRAZINE 4.5 MG/1
4.5 CAPSULE, GELATIN COATED ORAL
COMMUNITY
Start: 2022-11-15 | End: 2023-02-15 | Stop reason: CLARIF

## 2023-02-15 RX ORDER — DEXTROAMPHETAMINE SACCHARATE, AMPHETAMINE ASPARTATE MONOHYDRATE, DEXTROAMPHETAMINE SULFATE AND AMPHETAMINE SULFATE 7.5; 7.5; 7.5; 7.5 MG/1; MG/1; MG/1; MG/1
CAPSULE, EXTENDED RELEASE ORAL
COMMUNITY
End: 2023-02-15 | Stop reason: CLARIF

## 2023-02-15 RX ORDER — ROPINIROLE 1 MG/1
TABLET, FILM COATED ORAL
COMMUNITY
End: 2023-02-15 | Stop reason: CLARIF

## 2023-02-15 RX ORDER — LISINOPRIL AND HYDROCHLOROTHIAZIDE 12.5; 2 MG/1; MG/1
TABLET ORAL
COMMUNITY
End: 2023-02-15 | Stop reason: CLARIF

## 2023-02-15 RX ORDER — LISDEXAMFETAMINE DIMESYLATE 70 MG/1
70 CAPSULE ORAL
COMMUNITY
Start: 2023-01-24

## 2023-02-15 RX ORDER — VENLAFAXINE HYDROCHLORIDE 75 MG/1
75 CAPSULE, EXTENDED RELEASE ORAL
COMMUNITY
Start: 2023-01-30 | End: 2024-03-26

## 2023-02-15 RX ORDER — VENLAFAXINE HYDROCHLORIDE 75 MG/1
CAPSULE, EXTENDED RELEASE ORAL
COMMUNITY
End: 2023-02-15 | Stop reason: CLARIF

## 2023-02-15 RX ORDER — DEXTROAMPHETAMINE SULFATE, DEXTROAMPHETAMINE SACCHARATE, AMPHETAMINE SULFATE AND AMPHETAMINE ASPARTATE 7.5; 7.5; 7.5; 7.5 MG/1; MG/1; MG/1; MG/1
CAPSULE, EXTENDED RELEASE ORAL
COMMUNITY
Start: 2022-10-05 | End: 2023-02-15 | Stop reason: CLARIF

## 2023-02-15 RX ORDER — CITALOPRAM 20 MG/1
TABLET, FILM COATED ORAL
COMMUNITY
End: 2023-02-15 | Stop reason: CLARIF

## 2023-02-15 RX ORDER — CARIPRAZINE 4.5 MG/1
CAPSULE, GELATIN COATED ORAL
COMMUNITY
End: 2023-02-15 | Stop reason: CLARIF

## 2023-02-15 RX ORDER — METHOCARBAMOL 500 MG/1
TABLET, FILM COATED ORAL
COMMUNITY
End: 2023-02-15 | Stop reason: CLARIF

## 2023-02-15 RX ORDER — LISDEXAMFETAMINE DIMESYLATE 60 MG/1
60 CAPSULE ORAL
COMMUNITY
Start: 2022-12-29 | End: 2023-02-15 | Stop reason: CLARIF

## 2023-02-15 RX ORDER — DEXTROMETHORPHAN HYDROBROMIDE, GUAIFENESIN, PHENYLEPHRINE HYDROCHLORIDE 17.5; 400; 1 MG/1; MG/1; MG/1
1 TABLET ORAL EVERY 6 HOURS PRN
COMMUNITY
Start: 2022-09-25 | End: 2023-02-15 | Stop reason: CLARIF

## 2023-02-15 NOTE — ANESTHESIA PAT ROS NOTE
02/15/2023  Leora Steward is a 47 y.o., female.      Pre-op Assessment          Review of Systems  Anesthesia Hx:  No problems with previous Anesthesia             Denies Family Hx of Anesthesia complications.    Denies Personal Hx of Anesthesia complications.                    Hematology/Oncology:  Hematology Normal   Oncology Normal                                   EENT/Dental:   Wears glasses          Cardiovascular:  Exercise tolerance: good   Hypertension               Walking -Treadmill x45 intrigues-x2-3 per week/ Housework/ Mows the lawn                         Pulmonary:   COPD, mild                     Renal/:   renal calculi  As a teenager             Hepatic/GI:  Hepatic/GI Normal                 Musculoskeletal:  Musculoskeletal Normal                OB/GYN/PEDS:  None           Neurological:    Neuromuscular Disease,       Hx of migraines as a teenager-resolved                            Endocrine:  Diabetes, type 2   Diabetes Resolved after Gastric Bypass-2019        Dermatological:  Pannus abdominal   Psych:  Psychiatric History anxiety depression History            Alicia Garcia, CARMELO  2/15/23      Anesthesia Assessment: Preoperative EQUATION    Planned Procedure: Procedure(s) (LRB):  PANNICULECTOMY (N/A)  Requested Anesthesia Type:General  Surgeon: Cabrera Bardales MD  Service: Plastics  Known or anticipated Date of Surgery:3/7/2023    Surgeon notes: reviewed and Pannus abdominal    Previous anesthesia records:No problems and 10/16/19-Gastroenterostomy, Laparoscopic, with intra-op EGD-General -no apparent anesthetic complications and tolerated procedure well-no nausea/vomiting      Anesthesia Hx:  No problems with previous Anesthesia      Airway/Jaw/Neck:  Airway Findings: Mouth Opening: Normal Tongue: Normal  General Airway Assessment: Adult, Average  Mallampati: II  TM  "Distance: Normal, at least 6 cm  Jaw/Neck Findings:  Neck ROM: Normal ROM         Last PCP note: outside [facility] , Dr. Camacho Augustine -IM in [place]-"Clearance" appt. on 2/23/23 @ 8:30a-pending  Subspecialty notes:  Bariatrics visit    Other important co-morbidities: COPD, DM2, HTN, and Pannus, abdominal       Anxiety   Arthritis   COPD (chronic obstructive pulmonary disease)   Depression   Diabetes mellitus   Hypertension   Neuromuscular disorder     Tests already available:  No recent tests.       Plan: Phone pending     Testing:  A1C, BMP, EKG, and Hematology Profile   Patient  has previously scheduled Medical Appointment:None    Navigation:Phone Completed                        Tests Scheduled. A1C, BMP, EKG, and Hematology Profile done 2/20/23 & reviewed by Dr. CARLY Kendrick.             Consults scheduled.[facility] PCP-Dr. Camacho Augustine-[place]:-"Clearance" appt. on 2/23/23, @ 8:30a-pending & LM on 2/28/23, @ 9a, requesting fax of "Clearance" statement from [facility] PC Dr. Camacho Augustine-pending CB.             Results will be tracked by Preop Clinic.        Alicia Garcia RN 2/15/23 & 2/28/23     Addendum: Received [facility] IM "Clearqance" from Dr. Camacho Augustine & sent to be scanned into Epic as of 2/28/23.       Alicia Garcia RN 2/28/23        "

## 2023-02-16 ENCOUNTER — TELEPHONE (OUTPATIENT)
Dept: PREADMISSION TESTING | Facility: HOSPITAL | Age: 48
End: 2023-02-16

## 2023-02-16 NOTE — TELEPHONE ENCOUNTER
----- Message from Alicia Garcia RN sent at 2/15/2023  2:00 PM CST -----  Regarding: preop testing  Sx date-3/7/23-Need:Labs-A1c/BMP/Hem Prof/EKG(ordered), prior to the surgery date. Thank you. THOMAS

## 2023-02-22 ENCOUNTER — PATIENT MESSAGE (OUTPATIENT)
Dept: BARIATRICS | Facility: CLINIC | Age: 48
End: 2023-02-22
Payer: COMMERCIAL

## 2023-02-28 ENCOUNTER — TELEPHONE (OUTPATIENT)
Dept: PLASTIC SURGERY | Facility: CLINIC | Age: 48
End: 2023-02-28
Payer: COMMERCIAL

## 2023-03-06 ENCOUNTER — TELEPHONE (OUTPATIENT)
Dept: SURGERY | Facility: CLINIC | Age: 48
End: 2023-03-06
Payer: COMMERCIAL

## 2023-03-06 ENCOUNTER — ANESTHESIA EVENT (OUTPATIENT)
Dept: SURGERY | Facility: HOSPITAL | Age: 48
End: 2023-03-06
Payer: COMMERCIAL

## 2023-03-06 NOTE — TELEPHONE ENCOUNTER
Pt contact - notification to patient of surgery arrival time for 0700am, NPO status after midnight. Wash with dial soap PM night before and AM of surgery.  Wear comfortable clothing. PT confirms contact call from anesthesia.  Pt confirms ride home for discharge.  Confirmed main pharmacy with pt, [facility] [address] [place]. Call ended.

## 2023-03-06 NOTE — ANESTHESIA PREPROCEDURE EVALUATION
03/06/2023  Pre-operative evaluation for Procedure(s) (LRB):  PANNICULECTOMY (N/A)    Leora Steward is a 47 y.o. female     Patient Active Problem List   Diagnosis    Type 2 diabetes mellitus without complication, without long-term current use of insulin    Essential hypertension    Arthritis of neck    Anxiety    Depression    Chronic bronchitis    Class 1 obesity due to excess calories in adult    Morbid obesity       Review of patient's allergies indicates:   Allergen Reactions    Sulfamethoxazole-trimethoprim        No current facility-administered medications on file prior to encounter.     Current Outpatient Medications on File Prior to Encounter   Medication Sig Dispense Refill    b complex vitamins tablet Take 1 tablet by mouth once daily. Vitamin B 12 1200 mcg sublingual   No B1      buPROPion (WELLBUTRIN XL) 150 MG TB24 tablet Take 150 mg by mouth once daily.      CALCIUM CITRATE ORAL Take 1 tablet by mouth 3 (three) times daily.      montelukast (SINGULAIR) 10 mg tablet       multivitamin (THERAGRAN) per tablet Take 1 tablet by mouth 2 (two) times daily.      thiamine (VITAMIN B-1) 50 MG tablet Take 50 mg by mouth once daily.      traZODone (DESYREL) 50 MG tablet Take 50 mg by mouth every evening.      venlafaxine (EFFEXOR-XR) 75 MG 24 hr capsule Take 75 mg by mouth.      VYVANSE 70 mg capsule Take 70 mg by mouth.         Past Surgical History:   Procedure Laterality Date    CHOLECYSTECTOMY      HYSTERECTOMY      full    LAPAROSCOPIC GASTROENTEROSTOMY N/A 10/16/2019    Procedure: GASTROENTEROSTOMY, LAPAROSCOPIC, with intraop EGD;  Surgeon: Edmundo Ulrich MD;  Location: [facility] OR [address] [place] [address];  Service: General;  Laterality: N/A;    TUBAL LIGATION         Social History     Socioeconomic History    Marital status:    Tobacco Use    Smoking status: Former      Packs/day: 1.50     Types: Cigarettes     Quit date:      Years since quitting: 10.1    Smokeless tobacco: Never   Substance and Sexual Activity    Alcohol use: Not Currently    Drug use: Never    Sexual activity: Yes     Partners: Male     Birth control/protection: See Surgical Hx         CBC: No results for input(s): WBC, RBC, HGB, HCT, PLT, MCV, MCH, MCHC in the last 72 hours.    CMP: No results for input(s): NA, K, CL, CO2, BUN, CREATININE, GLU, MG, PHOS, CALCIUM, ALBUMIN, PROT, ALKPHOS, ALT, AST, BILITOT in the last 72 hours.    INR  No results for input(s): PT, INR, PROTIME, APTT in the last 72 hours.        Diagnostic Studies:      EKD Echo:  No results found for this or any previous visit.      Pre-op Assessment    I have reviewed the Patient Summary Reports.     I have reviewed the Nursing Notes. I have reviewed the NPO Status.   I have reviewed the Medications.     Review of Systems  Cardiovascular:   Hypertension    Pulmonary:   COPD    Endocrine:   Diabetes        Physical Exam  General: Well nourished and Cooperative    Airway:  Mallampati: II   Mouth Opening: Normal  TM Distance: Normal  Tongue: Normal  Neck ROM: Normal ROM    Chest/Lungs:  Clear to auscultation, Normal Respiratory Rate    Heart:  Rate: Normal  Rhythm: Regular Rhythm  Sounds: Normal        Anesthesia Plan  Type of Anesthesia, risks & benefits discussed:    Anesthesia Type: Gen ETT  Intra-op Monitoring Plan: Standard ASA Monitors  Post Op Pain Control Plan: multimodal analgesia and IV/PO Opioids PRN  Induction:  IV  Airway Plan: Direct and Video, Post-Induction  Informed Consent: Informed consent signed with the Patient and all parties understand the risks and agree with anesthesia plan.  All questions answered.   ASA Score: 2    Ready For Surgery From Anesthesia Perspective.     .

## 2023-03-07 ENCOUNTER — ANESTHESIA (OUTPATIENT)
Dept: SURGERY | Facility: HOSPITAL | Age: 48
End: 2023-03-07
Payer: COMMERCIAL

## 2023-03-07 ENCOUNTER — HOSPITAL ENCOUNTER (OUTPATIENT)
Facility: HOSPITAL | Age: 48
Discharge: HOME | End: 2023-03-07
Attending: SURGERY | Admitting: SURGERY
Payer: COMMERCIAL

## 2023-03-07 VITALS
WEIGHT: 150 LBS | SYSTOLIC BLOOD PRESSURE: 105 MMHG | HEIGHT: 63 IN | RESPIRATION RATE: 16 BRPM | BODY MASS INDEX: 26.58 KG/M2 | DIASTOLIC BLOOD PRESSURE: 65 MMHG | OXYGEN SATURATION: 95 % | TEMPERATURE: 99 F | HEART RATE: 86 BPM

## 2023-03-07 DIAGNOSIS — M79.3 PANNICULITIS: ICD-10-CM

## 2023-03-07 PROCEDURE — D9220A PRA ANESTHESIA: Mod: ,,, | Performed by: STUDENT IN AN ORGANIZED HEALTH CARE EDUCATION/TRAINING PROGRAM

## 2023-03-07 PROCEDURE — 15830 PR EXCISE EXCESS SKIN TISSUE,ABDOMEN: ICD-10-PCS | Mod: CSM,,, | Performed by: SURGERY

## 2023-03-07 PROCEDURE — 37000009 HC ANESTHESIA EA ADD 15 MINS: Performed by: SURGERY

## 2023-03-07 PROCEDURE — 71000016 HC POSTOP RECOV ADDL HR: Performed by: SURGERY

## 2023-03-07 PROCEDURE — 63600175 PHARM REV CODE 636 W HCPCS: Performed by: STUDENT IN AN ORGANIZED HEALTH CARE EDUCATION/TRAINING PROGRAM

## 2023-03-07 PROCEDURE — 15830 EXC EXCESSIVE SKIN ABDOMEN: CPT | Mod: CSM,,, | Performed by: SURGERY

## 2023-03-07 PROCEDURE — 71000015 HC POSTOP RECOV 1ST HR: Performed by: SURGERY

## 2023-03-07 PROCEDURE — 25000003 PHARM REV CODE 250: Performed by: STUDENT IN AN ORGANIZED HEALTH CARE EDUCATION/TRAINING PROGRAM

## 2023-03-07 PROCEDURE — 27201423 OPTIME MED/SURG SUP & DEVICES STERILE SUPPLY: Performed by: SURGERY

## 2023-03-07 PROCEDURE — 88305 TISSUE EXAM BY PATHOLOGIST: ICD-10-PCS | Mod: 26,,, | Performed by: PATHOLOGY

## 2023-03-07 PROCEDURE — C1729 CATH, DRAINAGE: HCPCS | Performed by: SURGERY

## 2023-03-07 PROCEDURE — 88305 TISSUE EXAM BY PATHOLOGIST: CPT | Performed by: PATHOLOGY

## 2023-03-07 PROCEDURE — 36000707: Performed by: SURGERY

## 2023-03-07 PROCEDURE — 36000706: Performed by: SURGERY

## 2023-03-07 PROCEDURE — 63600175 PHARM REV CODE 636 W HCPCS: Performed by: SURGERY

## 2023-03-07 PROCEDURE — 71000044 HC DOSC ROUTINE RECOVERY FIRST HOUR: Performed by: SURGERY

## 2023-03-07 PROCEDURE — D9220A PRA ANESTHESIA: ICD-10-PCS | Mod: ,,, | Performed by: STUDENT IN AN ORGANIZED HEALTH CARE EDUCATION/TRAINING PROGRAM

## 2023-03-07 PROCEDURE — 37000008 HC ANESTHESIA 1ST 15 MINUTES: Performed by: SURGERY

## 2023-03-07 PROCEDURE — 88305 TISSUE EXAM BY PATHOLOGIST: CPT | Mod: 26,,, | Performed by: PATHOLOGY

## 2023-03-07 RX ORDER — HYDROMORPHONE HYDROCHLORIDE 1 MG/ML
0.2 INJECTION, SOLUTION INTRAMUSCULAR; INTRAVENOUS; SUBCUTANEOUS EVERY 5 MIN PRN
Status: DISCONTINUED | OUTPATIENT
Start: 2023-03-07 | End: 2023-03-07

## 2023-03-07 RX ORDER — CEFAZOLIN SODIUM 1 G/3ML
INJECTION, POWDER, FOR SOLUTION INTRAMUSCULAR; INTRAVENOUS
Status: DISCONTINUED | OUTPATIENT
Start: 2023-03-07 | End: 2023-03-07

## 2023-03-07 RX ORDER — LIDOCAINE HYDROCHLORIDE 20 MG/ML
INJECTION INTRAVENOUS
Status: DISCONTINUED | OUTPATIENT
Start: 2023-03-07 | End: 2023-03-07

## 2023-03-07 RX ORDER — ROCURONIUM BROMIDE 10 MG/ML
INJECTION, SOLUTION INTRAVENOUS
Status: DISCONTINUED | OUTPATIENT
Start: 2023-03-07 | End: 2023-03-07

## 2023-03-07 RX ORDER — OXYCODONE HYDROCHLORIDE 5 MG/1
5 TABLET ORAL
Status: DISCONTINUED | OUTPATIENT
Start: 2023-03-07 | End: 2023-03-07 | Stop reason: HOSPADM

## 2023-03-07 RX ORDER — ONDANSETRON 2 MG/ML
4 INJECTION INTRAMUSCULAR; INTRAVENOUS EVERY 12 HOURS PRN
Status: DISCONTINUED | OUTPATIENT
Start: 2023-03-07 | End: 2023-03-07 | Stop reason: HOSPADM

## 2023-03-07 RX ORDER — KETAMINE HCL IN 0.9 % NACL 50 MG/5 ML
SYRINGE (ML) INTRAVENOUS
Status: DISCONTINUED | OUTPATIENT
Start: 2023-03-07 | End: 2023-03-07

## 2023-03-07 RX ORDER — HYDROCODONE BITARTRATE AND ACETAMINOPHEN 5; 325 MG/1; MG/1
1 TABLET ORAL EVERY 4 HOURS PRN
Status: DISCONTINUED | OUTPATIENT
Start: 2023-03-07 | End: 2023-03-07 | Stop reason: HOSPADM

## 2023-03-07 RX ORDER — MUPIROCIN 20 MG/G
OINTMENT TOPICAL 2 TIMES DAILY
Status: DISCONTINUED | OUTPATIENT
Start: 2023-03-07 | End: 2023-03-07 | Stop reason: HOSPADM

## 2023-03-07 RX ORDER — METHOCARBAMOL 500 MG/1
500 TABLET, FILM COATED ORAL 3 TIMES DAILY
Qty: 30 TABLET | Refills: 0 | Status: SHIPPED | OUTPATIENT
Start: 2023-03-07 | End: 2023-03-17

## 2023-03-07 RX ORDER — HALOPERIDOL 5 MG/ML
0.5 INJECTION INTRAMUSCULAR EVERY 10 MIN PRN
Status: DISCONTINUED | OUTPATIENT
Start: 2023-03-07 | End: 2023-03-07 | Stop reason: HOSPADM

## 2023-03-07 RX ORDER — FENTANYL CITRATE 50 UG/ML
INJECTION, SOLUTION INTRAMUSCULAR; INTRAVENOUS
Status: DISCONTINUED | OUTPATIENT
Start: 2023-03-07 | End: 2023-03-07

## 2023-03-07 RX ORDER — PHENYLEPHRINE HYDROCHLORIDE 10 MG/ML
INJECTION INTRAVENOUS
Status: DISCONTINUED | OUTPATIENT
Start: 2023-03-07 | End: 2023-03-07

## 2023-03-07 RX ORDER — OXYCODONE HYDROCHLORIDE 5 MG/1
5 TABLET ORAL EVERY 4 HOURS PRN
Qty: 28 TABLET | Refills: 0 | Status: SHIPPED | OUTPATIENT
Start: 2023-03-07 | End: 2023-03-15

## 2023-03-07 RX ORDER — OXYCODONE HYDROCHLORIDE 5 MG/1
5 TABLET ORAL EVERY 6 HOURS PRN
Qty: 28 TABLET | Refills: 0 | Status: SHIPPED | OUTPATIENT
Start: 2023-03-07 | End: 2023-03-15

## 2023-03-07 RX ORDER — NEOSTIGMINE METHYLSULFATE 0.5 MG/ML
INJECTION, SOLUTION INTRAVENOUS
Status: DISCONTINUED | OUTPATIENT
Start: 2023-03-07 | End: 2023-03-07

## 2023-03-07 RX ORDER — MUPIROCIN 20 MG/G
OINTMENT TOPICAL
Status: DISCONTINUED | OUTPATIENT
Start: 2023-03-07 | End: 2023-03-07 | Stop reason: HOSPADM

## 2023-03-07 RX ORDER — DEXAMETHASONE SODIUM PHOSPHATE 4 MG/ML
INJECTION, SOLUTION INTRA-ARTICULAR; INTRALESIONAL; INTRAMUSCULAR; INTRAVENOUS; SOFT TISSUE
Status: DISCONTINUED | OUTPATIENT
Start: 2023-03-07 | End: 2023-03-07

## 2023-03-07 RX ORDER — FENTANYL CITRATE 50 UG/ML
25 INJECTION, SOLUTION INTRAMUSCULAR; INTRAVENOUS EVERY 5 MIN PRN
Status: COMPLETED | OUTPATIENT
Start: 2023-03-07 | End: 2023-03-07

## 2023-03-07 RX ORDER — PROPOFOL 10 MG/ML
VIAL (ML) INTRAVENOUS
Status: DISCONTINUED | OUTPATIENT
Start: 2023-03-07 | End: 2023-03-07

## 2023-03-07 RX ORDER — HYDROMORPHONE HYDROCHLORIDE 1 MG/ML
0.2 INJECTION, SOLUTION INTRAMUSCULAR; INTRAVENOUS; SUBCUTANEOUS EVERY 5 MIN PRN
Status: DISCONTINUED | OUTPATIENT
Start: 2023-03-07 | End: 2023-03-07 | Stop reason: HOSPADM

## 2023-03-07 RX ORDER — MIDAZOLAM HYDROCHLORIDE 1 MG/ML
INJECTION, SOLUTION INTRAMUSCULAR; INTRAVENOUS
Status: DISCONTINUED | OUTPATIENT
Start: 2023-03-07 | End: 2023-03-07

## 2023-03-07 RX ORDER — ONDANSETRON 2 MG/ML
4 INJECTION INTRAMUSCULAR; INTRAVENOUS DAILY PRN
Status: DISCONTINUED | OUTPATIENT
Start: 2023-03-07 | End: 2023-03-07 | Stop reason: HOSPADM

## 2023-03-07 RX ORDER — SODIUM CHLORIDE 0.9 % (FLUSH) 0.9 %
10 SYRINGE (ML) INJECTION
Status: DISCONTINUED | OUTPATIENT
Start: 2023-03-07 | End: 2023-03-07 | Stop reason: HOSPADM

## 2023-03-07 RX ORDER — LIDOCAINE HYDROCHLORIDE 10 MG/ML
1 INJECTION, SOLUTION EPIDURAL; INFILTRATION; INTRACAUDAL; PERINEURAL ONCE
Status: DISCONTINUED | OUTPATIENT
Start: 2023-03-07 | End: 2023-03-07 | Stop reason: HOSPADM

## 2023-03-07 RX ORDER — HEPARIN SODIUM 5000 [USP'U]/ML
5000 INJECTION, SOLUTION INTRAVENOUS; SUBCUTANEOUS ONCE
Status: COMPLETED | OUTPATIENT
Start: 2023-03-07 | End: 2023-03-07

## 2023-03-07 RX ORDER — ONDANSETRON 4 MG/1
4 TABLET, ORALLY DISINTEGRATING ORAL EVERY 6 HOURS PRN
Qty: 30 TABLET | Refills: 0 | Status: SHIPPED | OUTPATIENT
Start: 2023-03-07 | End: 2024-03-26

## 2023-03-07 RX ORDER — ONDANSETRON 2 MG/ML
INJECTION INTRAMUSCULAR; INTRAVENOUS
Status: DISCONTINUED | OUTPATIENT
Start: 2023-03-07 | End: 2023-03-07

## 2023-03-07 RX ORDER — CEPHALEXIN 500 MG/1
500 CAPSULE ORAL EVERY 6 HOURS
Qty: 28 CAPSULE | Refills: 0 | Status: SHIPPED | OUTPATIENT
Start: 2023-03-07 | End: 2024-03-26

## 2023-03-07 RX ADMIN — CEFAZOLIN 2 G: 330 INJECTION, POWDER, FOR SOLUTION INTRAMUSCULAR; INTRAVENOUS at 11:03

## 2023-03-07 RX ADMIN — FENTANYL CITRATE 50 MCG: 50 INJECTION, SOLUTION INTRAMUSCULAR; INTRAVENOUS at 02:03

## 2023-03-07 RX ADMIN — OXYCODONE HYDROCHLORIDE 5 MG: 5 TABLET ORAL at 02:03

## 2023-03-07 RX ADMIN — GLYCOPYRROLATE 0.4 MG: 0.2 INJECTION, SOLUTION INTRAMUSCULAR; INTRAVENOUS at 11:03

## 2023-03-07 RX ADMIN — FENTANYL CITRATE 25 MCG: 50 INJECTION, SOLUTION INTRAMUSCULAR; INTRAVENOUS at 03:03

## 2023-03-07 RX ADMIN — ONDANSETRON 4 MG: 2 INJECTION INTRAMUSCULAR; INTRAVENOUS at 02:03

## 2023-03-07 RX ADMIN — MUPIROCIN: 20 OINTMENT TOPICAL at 08:03

## 2023-03-07 RX ADMIN — DEXAMETHASONE SODIUM PHOSPHATE 4 MG: 4 INJECTION, SOLUTION INTRAMUSCULAR; INTRAVENOUS at 11:03

## 2023-03-07 RX ADMIN — ROCURONIUM BROMIDE 30 MG: 10 INJECTION INTRAVENOUS at 12:03

## 2023-03-07 RX ADMIN — FENTANYL CITRATE 100 MCG: 50 INJECTION, SOLUTION INTRAMUSCULAR; INTRAVENOUS at 10:03

## 2023-03-07 RX ADMIN — HYDROMORPHONE HYDROCHLORIDE 0.2 MG: 1 INJECTION, SOLUTION INTRAMUSCULAR; INTRAVENOUS; SUBCUTANEOUS at 04:03

## 2023-03-07 RX ADMIN — HEPARIN SODIUM 5000 UNITS: 5000 INJECTION INTRAVENOUS; SUBCUTANEOUS at 08:03

## 2023-03-07 RX ADMIN — MIDAZOLAM HYDROCHLORIDE 2 MG: 1 INJECTION, SOLUTION INTRAMUSCULAR; INTRAVENOUS at 10:03

## 2023-03-07 RX ADMIN — FENTANYL CITRATE 25 MCG: 50 INJECTION, SOLUTION INTRAMUSCULAR; INTRAVENOUS at 02:03

## 2023-03-07 RX ADMIN — Medication 25 MG: at 11:03

## 2023-03-07 RX ADMIN — GLYCOPYRROLATE 0.4 MG: 0.2 INJECTION, SOLUTION INTRAMUSCULAR; INTRAVENOUS at 02:03

## 2023-03-07 RX ADMIN — FENTANYL CITRATE 50 MCG: 50 INJECTION, SOLUTION INTRAMUSCULAR; INTRAVENOUS at 01:03

## 2023-03-07 RX ADMIN — PROPOFOL 150 MG: 10 INJECTION, EMULSION INTRAVENOUS at 10:03

## 2023-03-07 RX ADMIN — PHENYLEPHRINE HYDROCHLORIDE 100 MCG: 10 INJECTION INTRAVENOUS at 12:03

## 2023-03-07 RX ADMIN — HYDROMORPHONE HYDROCHLORIDE 0.2 MG: 1 INJECTION, SOLUTION INTRAMUSCULAR; INTRAVENOUS; SUBCUTANEOUS at 03:03

## 2023-03-07 RX ADMIN — Medication 25 MG: at 12:03

## 2023-03-07 RX ADMIN — NEOSTIGMINE METHYLSULFATE 4 MG: 0.5 INJECTION, SOLUTION INTRAVENOUS at 02:03

## 2023-03-07 RX ADMIN — ROCURONIUM BROMIDE 40 MG: 10 INJECTION INTRAVENOUS at 10:03

## 2023-03-07 RX ADMIN — ONDANSETRON 4 MG: 2 INJECTION INTRAMUSCULAR; INTRAVENOUS at 05:03

## 2023-03-07 RX ADMIN — SODIUM CHLORIDE: 9 INJECTION, SOLUTION INTRAVENOUS at 10:03

## 2023-03-07 RX ADMIN — LIDOCAINE HYDROCHLORIDE 100 MG: 20 INJECTION INTRAVENOUS at 10:03

## 2023-03-07 RX ADMIN — PHENYLEPHRINE HYDROCHLORIDE 100 MCG: 10 INJECTION INTRAVENOUS at 01:03

## 2023-03-07 NOTE — H&P
History & Physical     SUBJECTIVE:        History of Present Illness:     Leora Steward presents to [facility] 2ND FLOOR on 2022 for evaluation for bilateral breast reduction secondary to symptomatic large pannus. She has a chief complaint of chronic back pain for years. She has tried NSAIDs with or without alleviation of pain. She had gastric bypass in 2019 and lost 100 lbs. Her weigh has been stable. She also complains of  macerating rashes below pannus that have not significantly improved with application of medicated ointments and creams.  She currently reports weight loss of 100lbs.  She denies smoking tobacco or the use of any nicotine containing products.              Past Medical History:   Diagnosis Date    Anxiety      Arthritis      COPD (chronic obstructive pulmonary disease)      Depression      Diabetes mellitus      Hypertension      Neuromuscular disorder                 Past Surgical History:   Procedure Laterality Date    CHOLECYSTECTOMY        HYSTERECTOMY         full    LAPAROSCOPIC GASTROENTEROSTOMY N/A 10/16/2019     Procedure: GASTROENTEROSTOMY, LAPAROSCOPIC, with intraop EGD;  Surgeon: Edmundo Ulrich MD;  Location: [facility] OR [address] [place] [address];  Service: General;  Laterality: N/A;    TUBAL LIGATION                   Family History   Problem Relation Age of Onset    Cancer Mother           lung    Diabetes Father      Hypertension Father      Diabetes Sister      Diabetes Brother      No Known Problems Daughter      No Known Problems Son      Thyroid disease Sister      No Known Problems Sister      No Known Problems Sister           Social History            Socioeconomic History    Marital status:    Tobacco Use    Smoking status: Former       Packs/day: 1.50       Types: Cigarettes       Quit date:        Years since quittin.8    Smokeless tobacco: Never   Substance and Sexual Activity    Alcohol use: Not Currently    Drug use: Never    Sexual activity:  Yes       Partners: Male       Birth control/protection: See Surgical Hx         Current Medications          Current Outpatient Medications   Medication Sig Dispense Refill    ALPRAZolam (XANAX) 0.5 MG tablet Take 0.5 mg by mouth as needed.        b complex vitamins tablet Take 1 tablet by mouth once daily. Vitamin B 12 1200 mcg sublingual   No B1        buPROPion (WELLBUTRIN XL) 150 MG TB24 tablet Take 150 mg by mouth once daily.        CALCIUM CITRATE ORAL Take 1 tablet by mouth 3 (three) times daily.        lamoTRIgine (LAMICTAL) 25 MG tablet Take 25 mg by mouth 2 (two) times daily.         lisdexamfetamine (VYVANSE) 40 MG Cap Take 40 mg by mouth once daily.        montelukast (SINGULAIR) 10 mg tablet          multivitamin (THERAGRAN) per tablet Take 1 tablet by mouth 2 (two) times daily.        naproxen (NAPROSYN) 500 MG tablet Take 1 tablet (500 mg total) by mouth 2 (two) times daily with meals. 20 tablet 0    polyethylene glycol (GLYCOLAX) 17 gram/dose powder Mix 1 capful (17 g) with fluids and take by mouth once daily. 510 g 3    thiamine (VITAMIN B-1) 50 MG tablet Take 50 mg by mouth once daily.        traZODone (DESYREL) 50 MG tablet Take 50 mg by mouth every evening.        venlafaxine (EFFEXOR-XR) 150 MG Cp24 Take 150 mg by mouth once daily.         gabapentin (NEURONTIN) 300 MG capsule Take 300 mg by mouth 3 (three) times daily.        ondansetron (ZOFRAN-ODT) 8 MG TbDL Dissolve 1 tablet (8 mg total) by mouth every 6 (six) hours as needed. 30 tablet 0      No current facility-administered medications for this visit.            Review of patient's allergies indicates:  No Known Allergies        Review of Systems:     Review of Systems   Abdomen: large panus with excessive skin and subq tissue    Musculoskeletal: Positive for back pain.   Neurological: Negative for headaches or dizziness        OBJECTIVE:         Physical Exam:     Physical Exam   Constitutional: She is oriented to person, place, and  time. She appears well-developed and well-nourished.   Neck: Normal range of motion. Neck supple. No tracheal deviation present.   Cardiovascular: Normal rate, regular rhythm and normal heart sounds.    Pulmonary/Chest: Effort normal and breath sounds normal, no palpable masses,   Abdominal: Soft. Large Pannus,  evidence of previous rashes, Bowel sounds are normal.   Musculoskeletal: Normal range of motion.   Neurological: She is alert and oriented to person, place, and time.   Skin: Skin is warm.            ASSESSMENT/PLAN:      Pannus  2. Chronic back pain  3. Chronic rashes     PLAN:     -Plan for OR 3/7  -Risk, benefits, and alternatives explained. She understands that the risks include but are not limited to bleeding, scarring, infection, pain, numbness, asymmetry, deformity, open wound, skin necrosis, wound dehiscence, permanent or temporary loss of sensation, poor cosmetic outcome, hematoma, seroma and pulmonary emobolus.        All questions were answered. The patient was advised to call the clinic with any questions or concerns prior to their next visit.

## 2023-03-07 NOTE — DISCHARGE INSTRUCTIONS
DISCHARGE INSTRUCTIONS:  Keep JESSICA drains in place and monitor output, record in log for office visit   There is tape with glue overlying your incision. Please do not remove as these will come off on their own in two-three weeks.  Please do not remove staples. These will be removed in clinic  Okay to shower in 48hrs, avoid submerging the incisions in bath tub/pool  Use of narcotics can lead to constipation, recommend taking a stool softener while on medications  Call the office with any questions or concerns   No heavy lifting  No driving while on

## 2023-03-07 NOTE — PATIENT INSTRUCTIONS
Keep JESSICA drains in place and monitor output, record in log for office visit   There is tape with glue overlying your incision. Please do not remove as these will come off on their own in two-three weeks.  Please do not remove staples. These will be removed in clinic  Okay to shower in 48hrs, avoid submerging the incisions in bath tub/pool  Use of narcotics can lead to constipation, recommend taking a stool softener while on medications  Call the office with any questions or concerns   No heavy lifting  No driving while on narcotics

## 2023-03-07 NOTE — BRIEF OP NOTE
[facility] - [facility] (2nd Fl)  Brief Operative Note    Surgery Date: 3/7/2023     Surgeon(s) and Role:     * Cabrera Bardales MD - Primary     * Vinicius Buck MD - Resident - Assisting     * Jordy Briones MD - Fellow        Pre-op Diagnosis:  Pannus, abdominal [E65]    Post-op Diagnosis:  Post-Op Diagnosis Codes:     * Pannus, abdominal [E65]    Procedure(s) (LRB):  PANNICULECTOMY (N/A)    Anesthesia: General    Operative Findings: [id number] panniculectomy performed.     Estimated Blood Loss: * No values recorded between 3/7/2023 11:33 AM and 3/7/2023  2:15 PM *         Specimens:   Specimen (24h ago, onward)       Start     Ordered    03/07/23 1305  Specimen to Pathology, Surgery Other  Once        Comments: Pre-op Diagnosis: Pannus, abdominal [E65]Procedure(s):PANNICULECTOMY Number of specimens: 1Name of specimens: 1. Pannus - permanent     References:    Click here for ordering Quick Tip   Question Answer Comment   Procedure Type: Other    Specimen Class: Routine/Screening    Which provider would you like to cc? CABRERA BARDALES    Release to patient Immediate        03/07/23 1334                      Discharge Note    OUTCOME: Patient tolerated treatment/procedure well without complication and is now ready for discharge.    DISPOSITION: Home or Self Care    FINAL DIAGNOSIS:  <principal problem not specified>    FOLLOWUP: In clinic    DISCHARGE INSTRUCTIONS:  Keep JESSICA drains in place and monitor output, record in log for office visit   There is tape with glue overlying your incision. Please do not remove as these will come off on their own in two-three weeks.  Please do not remove staples. These will be removed in clinic  Okay to shower in 48hrs, avoid submerging the incisions in bath tub/pool  Use of narcotics can lead to constipation, recommend taking a stool softener while on medications  Call the office with any questions or concerns   No heavy lifting  No driving while on narcotics

## 2023-03-07 NOTE — ANESTHESIA PROCEDURE NOTES
Intubation    Date/Time: 3/7/2023 10:55 AM  Performed by: Du Christian MD  Authorized by: Du Christian MD     Intubation:     Induction:  Intravenous    Intubated:  Postinduction    Mask Ventilation:  Easy mask    Attempts:  1    Attempted By:  Staff anesthesiologist    Method of Intubation:  Direct    Blade:  Lalo 3    Laryngeal View Grade: Grade I - full view of cords      Difficult Airway Encountered?: No      Complications:  None    Airway Device:  Oral endotracheal tube    Airway Device Size:  7.0    Style/Cuff Inflation:  Cuffed    Inflation Amount (mL):  6    Tube secured:  23    Placement Verified By:  Capnometry    Complicating Factors:  None    Findings Post-Intubation:  BS equal bilateral and atraumatic/condition of teeth unchanged

## 2023-03-07 NOTE — ANESTHESIA RELEASE NOTE
"Anesthesia Release from PACU Note    Patient: Leora Steward    Procedure(s) Performed: Procedure(s) (LRB):  PANNICULECTOMY (N/A)    Anesthesia type: general    Post pain: Adequate analgesia    Post assessment: no apparent anesthetic complications    Last Vitals:   Visit Vitals  /64 (BP Location: Left arm, Patient Position: Lying)   Pulse (!) 58   Temp 36.6 °C (97.8 °F) (Oral)   Resp 18   Ht 5' 3" (1.6 m)   Wt 68 kg (150 lb)   SpO2 99%   Breastfeeding No   BMI 26.57 kg/m²       Post vital signs: stable    Level of consciousness: awake    Nausea/Vomiting: no nausea/no vomiting    Complications: none    Airway Patency: patent    Respiratory: unassisted    Cardiovascular: stable and blood pressure at baseline    Hydration: euvolemic  "

## 2023-03-07 NOTE — OP NOTE
Date of surgery 03/07/2023   Preoperative diagnosis abdominal wall pannus   Postoperative diagnosis is same   Procedure performed manju navas panniculectomy  Surgeon Catia  Anesthesia general  Complications none   Blood loss 200 cc   Drains x2    The patient was evaluated in the preoperative holding area.  I discussed the procedure in detail with the patient as well as her .  I discussed the fact that she would not be thin after this operation.  That she would still have some role superiorly and posterior for her.  I discussed it would be no liposuction or abdominal wall plication.  I discussed that she would have her belly button removed.  The risks and possible complications including but not limited to infection, bleeding, scarring, loss of tissue with large open wounds, nerve injury, contour abnormalities, pulmonary embolus, and need for further surgery were explained.  The patient signed informed consent.      Patient was taken to the operative room placed in supine position after adequate general anesthesia patient was prepped and draped in a normal sterile fashion.  Measurements were taken for the transverse excision.  This measured 20 cm across.  The standard umbilicus to lower transverse abdominal incision were marked.  Dissection proceeded at the top.  Both of the vertical incisions were then made deepened down to the rectus fascia medially in the external oblique aponeurosis laterally.  A suprafascial dissection then proceeded down to the umbilicus the umbilical stalk was divided.  Transverse incisions were then made.  It was necessary to bekah the excess skin very far posteriorly.  The entire abdominal wall pannus was completely removed.  Photos were taken.  The bed was placed in a semi Alanis position.  Incisions were closed using running 2-0 Vicryl for Luis's fascia.  Interrupted 3-0 Monocryl followed by running 4-0 Monocryl subcuticular suture for the skin.  Laterally it was also closed  with skin staples.  Two drains have been placed there were no complications.

## 2023-03-08 ENCOUNTER — NURSE TRIAGE (OUTPATIENT)
Dept: ADMINISTRATIVE | Facility: CLINIC | Age: 48
End: 2023-03-08
Payer: COMMERCIAL

## 2023-03-08 ENCOUNTER — TELEPHONE (OUTPATIENT)
Dept: PLASTIC SURGERY | Facility: CLINIC | Age: 48
End: 2023-03-08
Payer: COMMERCIAL

## 2023-03-08 ENCOUNTER — PATIENT MESSAGE (OUTPATIENT)
Dept: BARIATRICS | Facility: CLINIC | Age: 48
End: 2023-03-08
Payer: COMMERCIAL

## 2023-03-08 LAB — POCT GLUCOSE: 130 MG/DL (ref 70–110)

## 2023-03-08 NOTE — TELEPHONE ENCOUNTER
Pt called with concerns of drain 2 not working.  Triaged patient over the phone, how to milk the drain so that it can function properly.  Pt saw flow through the drain before getting off the phone. Pt verbalized understanding.  Drain is working properly again.

## 2023-03-08 NOTE — TELEPHONE ENCOUNTER
----- Message from Yovany Banks sent at 3/8/2023  1:37 PM CST -----  Regarding: advice  Contact: [phone]  Patient calling regarding to 1 of the drains is not draining correctly, they would like to know whats the next plan of care... Please call and advice @[phone]

## 2023-03-08 NOTE — TELEPHONE ENCOUNTER
Vinicius calling on behalf of patient who is present. Post op reports one of her drains is not draining. Transferred to office per protocol.    Reason for Disposition   Caller has URGENT question and triager unable to answer question    Additional Information   Negative: Major abdominal surgical incision and wound gaping open with visible internal organs   Negative: Sounds like a life-threatening emergency to the triager   Negative: Bleeding from incision and won't stop after 10 minutes of direct pressure   Negative: Bleeding (more than a few drops) from incision and after blood vessel surgery (e.g., carotidendarterectomy, femoral bypass graft, kidney dialysis fistula, tracheostomy)   Negative: Bright red, wide-spread, sunburn-like rash   Negative: SEVERE pain in the incision   Negative: Incision gaping open and < 2 days (48 hours) since wound re-opened   Negative: Incision gaping open and length of opening > 2 inches (5 cm)   Negative: Patient sounds very sick or weak to the triager   Negative: Sounds like a serious complication to the triager   Negative: Fever > 100.4 F (38.0 C)   Negative: Incision looks infected (spreading redness, pain)   Negative: Red streak runs from the incision and longer than 1 inch (2.5 cm)   Negative: Pus or bad-smelling fluid draining from incision   Negative: Dressing soaked with blood or body fluid (e.g., drainage)   Negative: Raised bruise and size > 2 inches (5 cm) and expanding    Protocols used: Post-Op Incision Symptoms and [id number]-RY

## 2023-03-08 NOTE — PLAN OF CARE
Pt ambulated to restroom and urinated with no difficulty. Reports pain is tolerable at 2/10 to her abdomen. DC instructions provided in depth to family, family practiced JESSICA drain care with this RN. All questions answered. VSS. Denies N/V. No acute distress noted.

## 2023-03-08 NOTE — ANESTHESIA POSTPROCEDURE EVALUATION
Anesthesia Post Evaluation    Patient: Leora Steward    Procedure(s) Performed: Procedure(s) (LRB):  PANNICULECTOMY (N/A)    Final Anesthesia Type: general      Patient location during evaluation: PACU  Patient participation: Yes- Able to Participate  Level of consciousness: awake and alert  Post-procedure vital signs: reviewed and stable  Pain management: adequate  Airway patency: patent  RENETTA mitigation strategies: Multimodal analgesia  PONV status at discharge: No PONV  Anesthetic complications: no      Cardiovascular status: blood pressure returned to baseline and hemodynamically stable  Respiratory status: unassisted  Hydration status: euvolemic  Follow-up not needed.          Vitals Value Taken Time   /65 03/07/23 1747   Temp 37 °C (98.6 °F) 03/07/23 1745   Pulse 70 03/07/23 1759   Resp 27 03/07/23 1756   SpO2 95 % 03/07/23 1756   Vitals shown include unvalidated device data.      No case tracking events are documented in the log.      Pain/Alin Score: Pain Rating Prior to Med Admin: 5 (3/7/2023  4:29 PM)  Pain Rating Post Med Admin: 4 (3/7/2023  5:40 PM)  Alin Score: 10 (3/7/2023  3:00 PM)

## 2023-03-14 ENCOUNTER — PATIENT MESSAGE (OUTPATIENT)
Dept: BARIATRICS | Facility: CLINIC | Age: 48
End: 2023-03-14
Payer: COMMERCIAL

## 2023-03-14 LAB
FINAL PATHOLOGIC DIAGNOSIS: NORMAL
Lab: NORMAL

## 2023-03-15 ENCOUNTER — OFFICE VISIT (OUTPATIENT)
Dept: PLASTIC SURGERY | Facility: CLINIC | Age: 48
End: 2023-03-15
Payer: COMMERCIAL

## 2023-03-15 VITALS
WEIGHT: 150 LBS | SYSTOLIC BLOOD PRESSURE: 93 MMHG | OXYGEN SATURATION: 99 % | DIASTOLIC BLOOD PRESSURE: 50 MMHG | BODY MASS INDEX: 26.58 KG/M2 | HEART RATE: 83 BPM | HEIGHT: 63 IN

## 2023-03-15 DIAGNOSIS — Z09 SURGERY FOLLOW-UP EXAMINATION: Primary | ICD-10-CM

## 2023-03-15 PROCEDURE — 99999 PR PBB SHADOW E&M-EST. PATIENT-LVL III: ICD-10-PCS | Mod: PBBFAC,,, | Performed by: SURGERY

## 2023-03-15 PROCEDURE — 3074F PR MOST RECENT SYSTOLIC BLOOD PRESSURE < 130 MM HG: ICD-10-PCS | Mod: CPTII,S$GLB,, | Performed by: SURGERY

## 2023-03-15 PROCEDURE — 3074F SYST BP LT 130 MM HG: CPT | Mod: CPTII,S$GLB,, | Performed by: SURGERY

## 2023-03-15 PROCEDURE — 1160F RVW MEDS BY RX/DR IN RCRD: CPT | Mod: CPTII,S$GLB,, | Performed by: SURGERY

## 2023-03-15 PROCEDURE — 3008F PR BODY MASS INDEX (BMI) DOCUMENTED: ICD-10-PCS | Mod: CPTII,S$GLB,, | Performed by: SURGERY

## 2023-03-15 PROCEDURE — 3078F DIAST BP <80 MM HG: CPT | Mod: CPTII,S$GLB,, | Performed by: SURGERY

## 2023-03-15 PROCEDURE — 1160F PR REVIEW ALL MEDS BY PRESCRIBER/CLIN PHARMACIST DOCUMENTED: ICD-10-PCS | Mod: CPTII,S$GLB,, | Performed by: SURGERY

## 2023-03-15 PROCEDURE — 3078F PR MOST RECENT DIASTOLIC BLOOD PRESSURE < 80 MM HG: ICD-10-PCS | Mod: CPTII,S$GLB,, | Performed by: SURGERY

## 2023-03-15 PROCEDURE — 99024 PR POST-OP FOLLOW-UP VISIT: ICD-10-PCS | Mod: S$GLB,,, | Performed by: SURGERY

## 2023-03-15 PROCEDURE — 1159F PR MEDICATION LIST DOCUMENTED IN MEDICAL RECORD: ICD-10-PCS | Mod: CPTII,S$GLB,, | Performed by: SURGERY

## 2023-03-15 PROCEDURE — 3044F PR MOST RECENT HEMOGLOBIN A1C LEVEL <7.0%: ICD-10-PCS | Mod: CPTII,S$GLB,, | Performed by: SURGERY

## 2023-03-15 PROCEDURE — 99999 PR PBB SHADOW E&M-EST. PATIENT-LVL III: CPT | Mod: PBBFAC,,, | Performed by: SURGERY

## 2023-03-15 PROCEDURE — 99024 POSTOP FOLLOW-UP VISIT: CPT | Mod: S$GLB,,, | Performed by: SURGERY

## 2023-03-15 PROCEDURE — 3008F BODY MASS INDEX DOCD: CPT | Mod: CPTII,S$GLB,, | Performed by: SURGERY

## 2023-03-15 PROCEDURE — 3044F HG A1C LEVEL LT 7.0%: CPT | Mod: CPTII,S$GLB,, | Performed by: SURGERY

## 2023-03-15 PROCEDURE — 1159F MED LIST DOCD IN RCRD: CPT | Mod: CPTII,S$GLB,, | Performed by: SURGERY

## 2023-03-15 RX ORDER — OXYCODONE HYDROCHLORIDE 5 MG/1
5 TABLET ORAL EVERY 4 HOURS PRN
Qty: 14 TABLET | Refills: 0 | Status: SHIPPED | OUTPATIENT
Start: 2023-03-15 | End: 2024-03-26

## 2023-03-17 NOTE — PROGRESS NOTES
Patient presents Plastic surgery Clinic 1 week after having a panniculectomy.  She is done very well.  One drain was removed today.  She will return next week.

## 2023-03-22 ENCOUNTER — OFFICE VISIT (OUTPATIENT)
Dept: PLASTIC SURGERY | Facility: CLINIC | Age: 48
End: 2023-03-22
Payer: COMMERCIAL

## 2023-03-22 VITALS — HEART RATE: 77 BPM | OXYGEN SATURATION: 98 % | SYSTOLIC BLOOD PRESSURE: 122 MMHG | DIASTOLIC BLOOD PRESSURE: 63 MMHG

## 2023-03-22 DIAGNOSIS — Z09 SURGERY FOLLOW-UP EXAMINATION: Primary | ICD-10-CM

## 2023-03-22 PROCEDURE — 3078F DIAST BP <80 MM HG: CPT | Mod: CPTII,S$GLB,, | Performed by: SURGERY

## 2023-03-22 PROCEDURE — 3074F PR MOST RECENT SYSTOLIC BLOOD PRESSURE < 130 MM HG: ICD-10-PCS | Mod: CPTII,S$GLB,, | Performed by: SURGERY

## 2023-03-22 PROCEDURE — 3078F PR MOST RECENT DIASTOLIC BLOOD PRESSURE < 80 MM HG: ICD-10-PCS | Mod: CPTII,S$GLB,, | Performed by: SURGERY

## 2023-03-22 PROCEDURE — 99999 PR PBB SHADOW E&M-EST. PATIENT-LVL III: ICD-10-PCS | Mod: PBBFAC,,, | Performed by: SURGERY

## 2023-03-22 PROCEDURE — 3074F SYST BP LT 130 MM HG: CPT | Mod: CPTII,S$GLB,, | Performed by: SURGERY

## 2023-03-22 PROCEDURE — 99024 POSTOP FOLLOW-UP VISIT: CPT | Mod: S$GLB,,, | Performed by: SURGERY

## 2023-03-22 PROCEDURE — 1159F MED LIST DOCD IN RCRD: CPT | Mod: CPTII,S$GLB,, | Performed by: SURGERY

## 2023-03-22 PROCEDURE — 99999 PR PBB SHADOW E&M-EST. PATIENT-LVL III: CPT | Mod: PBBFAC,,, | Performed by: SURGERY

## 2023-03-22 PROCEDURE — 1160F PR REVIEW ALL MEDS BY PRESCRIBER/CLIN PHARMACIST DOCUMENTED: ICD-10-PCS | Mod: CPTII,S$GLB,, | Performed by: SURGERY

## 2023-03-22 PROCEDURE — 3044F PR MOST RECENT HEMOGLOBIN A1C LEVEL <7.0%: ICD-10-PCS | Mod: CPTII,S$GLB,, | Performed by: SURGERY

## 2023-03-22 PROCEDURE — 99024 PR POST-OP FOLLOW-UP VISIT: ICD-10-PCS | Mod: S$GLB,,, | Performed by: SURGERY

## 2023-03-22 PROCEDURE — 1159F PR MEDICATION LIST DOCUMENTED IN MEDICAL RECORD: ICD-10-PCS | Mod: CPTII,S$GLB,, | Performed by: SURGERY

## 2023-03-22 PROCEDURE — 3044F HG A1C LEVEL LT 7.0%: CPT | Mod: CPTII,S$GLB,, | Performed by: SURGERY

## 2023-03-22 PROCEDURE — 1160F RVW MEDS BY RX/DR IN RCRD: CPT | Mod: CPTII,S$GLB,, | Performed by: SURGERY

## 2023-03-22 NOTE — PROGRESS NOTES
Plastic Surgery Clinic Postop Visit    Subjective:      Leora Steward is a 47 y.o. year old female who presents to the Plastic Surgery Clinic on 03/22/2023 for follow up visit status post manju navas panniculectomy on 3/7/2023. Pt has one remaining left drain with an output of 40 ccs in a 24 hour period. Pt reports her pain is doing well, and she can use ibuprofen and tylenol. She also reports using her abdominal binder as instructed.   Denies fever, chills, nausea, vomiting, or other systemic signs of infection.    Vitals:    03/22/23 0843   BP: 122/63   Pulse: 77        Review of patient's allergies indicates:   Allergen Reactions    Sulfamethoxazole-trimethoprim        Current Outpatient Medications on File Prior to Visit   Medication Sig Dispense Refill    b complex vitamins tablet Take 1 tablet by mouth once daily. Vitamin B 12 1200 mcg sublingual   No B1      buPROPion (WELLBUTRIN XL) 150 MG TB24 tablet Take 150 mg by mouth once daily.      CALCIUM CITRATE ORAL Take 1 tablet by mouth 3 (three) times daily.      montelukast (SINGULAIR) 10 mg tablet       multivitamin (THERAGRAN) per tablet Take 1 tablet by mouth 2 (two) times daily.      ondansetron (ZOFRAN-ODT) 4 MG TbDL Dissolve 1 tablet (4 mg total) by mouth every 6 (six) hours as needed (Nausea). 30 tablet 0    oxyCODONE (ROXICODONE) 5 MG immediate release tablet Take 1 tablet (5 mg total) by mouth every 4 (four) hours as needed for Pain. 14 tablet 0    thiamine (VITAMIN B-1) 50 MG tablet Take 50 mg by mouth once daily.      traZODone (DESYREL) 50 MG tablet Take 50 mg by mouth every evening.      venlafaxine (EFFEXOR-XR) 75 MG 24 hr capsule Take 75 mg by mouth.      VYVANSE 70 mg capsule Take 70 mg by mouth.      cephALEXin (KEFLEX) 500 MG capsule Take 1 capsule (500 mg total) by mouth every 6 (six) hours. (Patient not taking: Reported on 3/22/2023) 28 capsule 0     No current facility-administered medications on file prior to visit.        Patient Active Problem List   Diagnosis    Type 2 diabetes mellitus without complication, without long-term current use of insulin    Essential hypertension    Arthritis of neck    Anxiety    Depression    Chronic bronchitis    Class 1 obesity due to excess calories in adult    Morbid obesity       Past Surgical History:   Procedure Laterality Date    CHOLECYSTECTOMY      HYSTERECTOMY      full    LAPAROSCOPIC GASTROENTEROSTOMY N/A 10/16/2019    Procedure: GASTROENTEROSTOMY, LAPAROSCOPIC, with intraop EGD;  Surgeon: Edmundo Ulrich MD;  Location: [address];  Service: General;  Laterality: N/A;    PANNICULECTOMY N/A 3/7/2023    Procedure: PANNICULECTOMY;  Surgeon: Cabrera Bardales MD;  Location: [facility] OR [address] [place] [address];  Service: Plastics;  Laterality: N/A;    TUBAL LIGATION         Social History     Socioeconomic History    Marital status:    Tobacco Use    Smoking status: Former     Packs/day: 1.50     Types: Cigarettes     Quit date: 2013     Years since quitting: 10.2    Smokeless tobacco: Never   Substance and Sexual Activity    Alcohol use: Not Currently    Drug use: Never    Sexual activity: Yes     Partners: Male     Birth control/protection: See Surgical Hx           Review of Systems:   Constitutional: Denies fever/chills  Eyes: Denies change in vision  ENT: Denies sore throat or rhinorrhea   Respiratory: Denies shortness of breath or cough  Cardiovascular: Denies chest pain or palpitations  Gastrointestinal: Denies abdominal pain, nausea, or vomiting  Genitourinary: Denies dysuria and flank pain  Skin: Denies new rash or skin lesions   Allergic/Immunologic: Denies adverse reactions to current medications  Neurological: Denies headaches or dizziness  Musculoskeletal: Denies arthralgias    Objective:     Physical Exam:  Vitals:    03/22/23 0843   BP: 122/63   Pulse: 77       WD WN NAD  VSS  Normal resp effort  Abdomen: incision CDI. No erythema or discharge. Tape and glue in place.  Staples laterally. Left drain with serosanguinous fluid.         Assessment:       1. Surgery follow-up examination        Plan:   47 y.o. female status post manju de lis panniculectomy  - Doing well, no issues  - Removed every other staple.   - Advised pt that she must continue to wear her abdominal binder.  - Return to clinic in 1 week      All questions were answered. The patient was advised to call the clinic with any questions or concerns prior to their next visit.       Vijaya Marie PA-C  Plastic and Reconstructive Surgery  [phone]

## 2023-03-29 ENCOUNTER — OFFICE VISIT (OUTPATIENT)
Dept: PLASTIC SURGERY | Facility: CLINIC | Age: 48
End: 2023-03-29
Payer: COMMERCIAL

## 2023-03-29 VITALS
DIASTOLIC BLOOD PRESSURE: 57 MMHG | BODY MASS INDEX: 26.57 KG/M2 | HEIGHT: 63 IN | WEIGHT: 149.94 LBS | HEART RATE: 78 BPM | SYSTOLIC BLOOD PRESSURE: 105 MMHG | OXYGEN SATURATION: 99 %

## 2023-03-29 DIAGNOSIS — Z09 SURGERY FOLLOW-UP EXAMINATION: Primary | ICD-10-CM

## 2023-03-29 PROCEDURE — 1159F MED LIST DOCD IN RCRD: CPT | Mod: CPTII,S$GLB,,

## 2023-03-29 PROCEDURE — 3078F DIAST BP <80 MM HG: CPT | Mod: CPTII,S$GLB,,

## 2023-03-29 PROCEDURE — 3044F PR MOST RECENT HEMOGLOBIN A1C LEVEL <7.0%: ICD-10-PCS | Mod: CPTII,S$GLB,,

## 2023-03-29 PROCEDURE — 1159F PR MEDICATION LIST DOCUMENTED IN MEDICAL RECORD: ICD-10-PCS | Mod: CPTII,S$GLB,,

## 2023-03-29 PROCEDURE — 3008F BODY MASS INDEX DOCD: CPT | Mod: CPTII,S$GLB,,

## 2023-03-29 PROCEDURE — 3008F PR BODY MASS INDEX (BMI) DOCUMENTED: ICD-10-PCS | Mod: CPTII,S$GLB,,

## 2023-03-29 PROCEDURE — 99999 PR PBB SHADOW E&M-EST. PATIENT-LVL IV: ICD-10-PCS | Mod: PBBFAC,,,

## 2023-03-29 PROCEDURE — 3074F SYST BP LT 130 MM HG: CPT | Mod: CPTII,S$GLB,,

## 2023-03-29 PROCEDURE — 99024 PR POST-OP FOLLOW-UP VISIT: ICD-10-PCS | Mod: S$GLB,,,

## 2023-03-29 PROCEDURE — 1160F PR REVIEW ALL MEDS BY PRESCRIBER/CLIN PHARMACIST DOCUMENTED: ICD-10-PCS | Mod: CPTII,S$GLB,,

## 2023-03-29 PROCEDURE — 99024 POSTOP FOLLOW-UP VISIT: CPT | Mod: S$GLB,,,

## 2023-03-29 PROCEDURE — 3044F HG A1C LEVEL LT 7.0%: CPT | Mod: CPTII,S$GLB,,

## 2023-03-29 PROCEDURE — 3074F PR MOST RECENT SYSTOLIC BLOOD PRESSURE < 130 MM HG: ICD-10-PCS | Mod: CPTII,S$GLB,,

## 2023-03-29 PROCEDURE — 99999 PR PBB SHADOW E&M-EST. PATIENT-LVL IV: CPT | Mod: PBBFAC,,,

## 2023-03-29 PROCEDURE — 3078F PR MOST RECENT DIASTOLIC BLOOD PRESSURE < 80 MM HG: ICD-10-PCS | Mod: CPTII,S$GLB,,

## 2023-03-29 PROCEDURE — 1160F RVW MEDS BY RX/DR IN RCRD: CPT | Mod: CPTII,S$GLB,,

## 2023-03-29 NOTE — PROGRESS NOTES
Plastic Surgery Clinic Postop Visit    Subjective:      Leora Steward is a 47 y.o. year old female who presents to the Plastic Surgery Clinic on 03/29/2023 for follow up visit status post manju navas panniculectomy on 3/7/2023. Pt has one remaining left drain with an output of 15 ccs in a 24 hour period. Pt reports her pain is doing well, and she can use ibuprofen and tylenol. She also reports using her abdominal binder as instructed.   Denies fever, chills, nausea, vomiting, or other systemic signs of infection.    Vitals:    03/29/23 0838   BP: (!) 105/57   Pulse: 78        Review of patient's allergies indicates:   Allergen Reactions    Sulfamethoxazole-trimethoprim        Current Outpatient Medications on File Prior to Visit   Medication Sig Dispense Refill    b complex vitamins tablet Take 1 tablet by mouth once daily. Vitamin B 12 1200 mcg sublingual   No B1      buPROPion (WELLBUTRIN XL) 150 MG TB24 tablet Take 150 mg by mouth once daily.      CALCIUM CITRATE ORAL Take 1 tablet by mouth 3 (three) times daily.      cephALEXin (KEFLEX) 500 MG capsule Take 1 capsule (500 mg total) by mouth every 6 (six) hours. 28 capsule 0    montelukast (SINGULAIR) 10 mg tablet       multivitamin (THERAGRAN) per tablet Take 1 tablet by mouth 2 (two) times daily.      ondansetron (ZOFRAN-ODT) 4 MG TbDL Dissolve 1 tablet (4 mg total) by mouth every 6 (six) hours as needed (Nausea). 30 tablet 0    oxyCODONE (ROXICODONE) 5 MG immediate release tablet Take 1 tablet (5 mg total) by mouth every 4 (four) hours as needed for Pain. 14 tablet 0    thiamine (VITAMIN B-1) 50 MG tablet Take 50 mg by mouth once daily.      traZODone (DESYREL) 50 MG tablet Take 50 mg by mouth every evening.      venlafaxine (EFFEXOR-XR) 75 MG 24 hr capsule Take 75 mg by mouth.      VYVANSE 70 mg capsule Take 70 mg by mouth.       No current facility-administered medications on file prior to visit.       Patient Active Problem List   Diagnosis     Type 2 diabetes mellitus without complication, without long-term current use of insulin    Essential hypertension    Arthritis of neck    Anxiety    Depression    Chronic bronchitis    Class 1 obesity due to excess calories in adult    Morbid obesity       Past Surgical History:   Procedure Laterality Date    CHOLECYSTECTOMY      HYSTERECTOMY      full    LAPAROSCOPIC GASTROENTEROSTOMY N/A 10/16/2019    Procedure: GASTROENTEROSTOMY, LAPAROSCOPIC, with intraop EGD;  Surgeon: Edmundo Ulrich MD;  Location: [facility] OR [address] [place] [address];  Service: General;  Laterality: N/A;    PANNICULECTOMY N/A 3/7/2023    Procedure: PANNICULECTOMY;  Surgeon: Cabrera Bardales MD;  Location: [facility] OR [address] [place] [address];  Service: Plastics;  Laterality: N/A;    TUBAL LIGATION         Social History     Socioeconomic History    Marital status:    Tobacco Use    Smoking status: Former     Packs/day: 1.50     Types: Cigarettes     Quit date: 2013     Years since quitting: 10.2    Smokeless tobacco: Never   Substance and Sexual Activity    Alcohol use: Not Currently    Drug use: Never    Sexual activity: Yes     Partners: Male     Birth control/protection: See Surgical Hx           Review of Systems:   Constitutional: Denies fever/chills  Eyes: Denies change in vision  ENT: Denies sore throat or rhinorrhea   Respiratory: Denies shortness of breath or cough  Cardiovascular: Denies chest pain or palpitations  Gastrointestinal: Denies abdominal pain, nausea, or vomiting  Genitourinary: Denies dysuria and flank pain  Skin: Denies new rash or skin lesions   Allergic/Immunologic: Denies adverse reactions to current medications  Neurological: Denies headaches or dizziness  Musculoskeletal: Denies arthralgias    Objective:     Physical Exam:  Vitals:    03/29/23 0838   BP: (!) 105/57   Pulse: 78       WD WN NAD  VSS  Normal resp effort  Abdomen: incision CDI. No erythema or discharge. Tape and glue in place. Staples laterally. Left drain with  serosanguinous fluid. Once tape removed, very minor breakdown along the T.         Assessment:       1. Surgery follow-up examination          Plan:   47 y.o. female status post manju de lis panniculectomy  - Doing well, no issues  - Removed remaining staples, tape, and left drain  - Advised pt that she must continue to wear her abdominal binder.  - Advised pt to use bacitracin with a bandaid and pad on the T.   - Return to clinic in 4 weeks      All questions were answered. The patient was advised to call the clinic with any questions or concerns prior to their next visit.       Vijaya Marie PA-C  Plastic and Reconstructive Surgery  [phone]

## 2023-04-05 ENCOUNTER — PATIENT MESSAGE (OUTPATIENT)
Dept: BARIATRICS | Facility: CLINIC | Age: 48
End: 2023-04-05
Payer: COMMERCIAL

## 2023-04-10 ENCOUNTER — OFFICE VISIT (OUTPATIENT)
Dept: PLASTIC SURGERY | Facility: CLINIC | Age: 48
End: 2023-04-10
Payer: COMMERCIAL

## 2023-04-10 VITALS
DIASTOLIC BLOOD PRESSURE: 71 MMHG | WEIGHT: 149 LBS | BODY MASS INDEX: 26.4 KG/M2 | SYSTOLIC BLOOD PRESSURE: 106 MMHG | HEIGHT: 63 IN | HEART RATE: 93 BPM

## 2023-04-10 DIAGNOSIS — Z09 SURGERY FOLLOW-UP EXAMINATION: Primary | ICD-10-CM

## 2023-04-10 PROCEDURE — 1160F RVW MEDS BY RX/DR IN RCRD: CPT | Mod: CPTII,S$GLB,, | Performed by: SURGERY

## 2023-04-10 PROCEDURE — 99024 POSTOP FOLLOW-UP VISIT: CPT | Mod: S$GLB,,, | Performed by: SURGERY

## 2023-04-10 PROCEDURE — 99999 PR PBB SHADOW E&M-EST. PATIENT-LVL III: ICD-10-PCS | Mod: PBBFAC,,, | Performed by: SURGERY

## 2023-04-10 PROCEDURE — 99999 PR PBB SHADOW E&M-EST. PATIENT-LVL III: CPT | Mod: PBBFAC,,, | Performed by: SURGERY

## 2023-04-10 PROCEDURE — 1159F MED LIST DOCD IN RCRD: CPT | Mod: CPTII,S$GLB,, | Performed by: SURGERY

## 2023-04-10 PROCEDURE — 3074F SYST BP LT 130 MM HG: CPT | Mod: CPTII,S$GLB,, | Performed by: SURGERY

## 2023-04-10 PROCEDURE — 3044F PR MOST RECENT HEMOGLOBIN A1C LEVEL <7.0%: ICD-10-PCS | Mod: CPTII,S$GLB,, | Performed by: SURGERY

## 2023-04-10 PROCEDURE — 3008F BODY MASS INDEX DOCD: CPT | Mod: CPTII,S$GLB,, | Performed by: SURGERY

## 2023-04-10 PROCEDURE — 3078F PR MOST RECENT DIASTOLIC BLOOD PRESSURE < 80 MM HG: ICD-10-PCS | Mod: CPTII,S$GLB,, | Performed by: SURGERY

## 2023-04-10 PROCEDURE — 1159F PR MEDICATION LIST DOCUMENTED IN MEDICAL RECORD: ICD-10-PCS | Mod: CPTII,S$GLB,, | Performed by: SURGERY

## 2023-04-10 PROCEDURE — 99024 PR POST-OP FOLLOW-UP VISIT: ICD-10-PCS | Mod: S$GLB,,, | Performed by: SURGERY

## 2023-04-10 PROCEDURE — 3008F PR BODY MASS INDEX (BMI) DOCUMENTED: ICD-10-PCS | Mod: CPTII,S$GLB,, | Performed by: SURGERY

## 2023-04-10 PROCEDURE — 3078F DIAST BP <80 MM HG: CPT | Mod: CPTII,S$GLB,, | Performed by: SURGERY

## 2023-04-10 PROCEDURE — 3074F PR MOST RECENT SYSTOLIC BLOOD PRESSURE < 130 MM HG: ICD-10-PCS | Mod: CPTII,S$GLB,, | Performed by: SURGERY

## 2023-04-10 PROCEDURE — 3044F HG A1C LEVEL LT 7.0%: CPT | Mod: CPTII,S$GLB,, | Performed by: SURGERY

## 2023-04-10 PROCEDURE — 1160F PR REVIEW ALL MEDS BY PRESCRIBER/CLIN PHARMACIST DOCUMENTED: ICD-10-PCS | Mod: CPTII,S$GLB,, | Performed by: SURGERY

## 2023-04-10 NOTE — PROGRESS NOTES
Patient presents Plastic surgery Clinic after having a floridly panniculectomy.  She has lost a very small 1.5-2 cm amount of skin at the T incision.  This was debrided today.  She will use wet-to-dry.  This would take 4-6 weeks to heal.  She will return to clinic in 2 weeks.

## 2023-04-11 ENCOUNTER — PATIENT MESSAGE (OUTPATIENT)
Dept: BARIATRICS | Facility: CLINIC | Age: 48
End: 2023-04-11
Payer: COMMERCIAL

## 2023-04-14 ENCOUNTER — PATIENT MESSAGE (OUTPATIENT)
Dept: BARIATRICS | Facility: CLINIC | Age: 48
End: 2023-04-14
Payer: COMMERCIAL

## 2023-04-26 ENCOUNTER — OFFICE VISIT (OUTPATIENT)
Dept: PLASTIC SURGERY | Facility: CLINIC | Age: 48
End: 2023-04-26
Payer: COMMERCIAL

## 2023-04-26 VITALS
DIASTOLIC BLOOD PRESSURE: 69 MMHG | HEART RATE: 80 BPM | BODY MASS INDEX: 26.4 KG/M2 | SYSTOLIC BLOOD PRESSURE: 129 MMHG | HEIGHT: 63 IN | WEIGHT: 149 LBS

## 2023-04-26 DIAGNOSIS — Z09 SURGERY FOLLOW-UP EXAMINATION: Primary | ICD-10-CM

## 2023-04-26 PROCEDURE — 99024 POSTOP FOLLOW-UP VISIT: CPT | Mod: S$GLB,,,

## 2023-04-26 PROCEDURE — 3078F DIAST BP <80 MM HG: CPT | Mod: CPTII,S$GLB,,

## 2023-04-26 PROCEDURE — 99024 PR POST-OP FOLLOW-UP VISIT: ICD-10-PCS | Mod: S$GLB,,,

## 2023-04-26 PROCEDURE — 3044F PR MOST RECENT HEMOGLOBIN A1C LEVEL <7.0%: ICD-10-PCS | Mod: CPTII,S$GLB,,

## 2023-04-26 PROCEDURE — 99999 PR PBB SHADOW E&M-EST. PATIENT-LVL III: CPT | Mod: PBBFAC,,,

## 2023-04-26 PROCEDURE — 3074F PR MOST RECENT SYSTOLIC BLOOD PRESSURE < 130 MM HG: ICD-10-PCS | Mod: CPTII,S$GLB,,

## 2023-04-26 PROCEDURE — 3078F PR MOST RECENT DIASTOLIC BLOOD PRESSURE < 80 MM HG: ICD-10-PCS | Mod: CPTII,S$GLB,,

## 2023-04-26 PROCEDURE — 3044F HG A1C LEVEL LT 7.0%: CPT | Mod: CPTII,S$GLB,,

## 2023-04-26 PROCEDURE — 1159F PR MEDICATION LIST DOCUMENTED IN MEDICAL RECORD: ICD-10-PCS | Mod: CPTII,S$GLB,,

## 2023-04-26 PROCEDURE — 1159F MED LIST DOCD IN RCRD: CPT | Mod: CPTII,S$GLB,,

## 2023-04-26 PROCEDURE — 3074F SYST BP LT 130 MM HG: CPT | Mod: CPTII,S$GLB,,

## 2023-04-26 PROCEDURE — 3008F BODY MASS INDEX DOCD: CPT | Mod: CPTII,S$GLB,,

## 2023-04-26 PROCEDURE — 1160F PR REVIEW ALL MEDS BY PRESCRIBER/CLIN PHARMACIST DOCUMENTED: ICD-10-PCS | Mod: CPTII,S$GLB,,

## 2023-04-26 PROCEDURE — 1160F RVW MEDS BY RX/DR IN RCRD: CPT | Mod: CPTII,S$GLB,,

## 2023-04-26 PROCEDURE — 99999 PR PBB SHADOW E&M-EST. PATIENT-LVL III: ICD-10-PCS | Mod: PBBFAC,,,

## 2023-04-26 PROCEDURE — 3008F PR BODY MASS INDEX (BMI) DOCUMENTED: ICD-10-PCS | Mod: CPTII,S$GLB,,

## 2023-04-26 NOTE — PROGRESS NOTES
Plastic Surgery Clinic Postop Visit    Subjective:      Leora Steward is a 47 y.o. year old female who presents to the Plastic Surgery Clinic on 04/26/2023 for follow up visit status post manju navas panniculectomy on 3/7/2023. Pt reports she is doing well. She has been packing her T and she reports that it has not grown in size since we last saw her.   Denies fever, chills, nausea, vomiting, or other systemic signs of infection.    Vitals:    04/26/23 0825   BP: 129/69   Pulse: 80        Review of patient's allergies indicates:   Allergen Reactions    Sulfamethoxazole-trimethoprim        Current Outpatient Medications on File Prior to Visit   Medication Sig Dispense Refill    b complex vitamins tablet Take 1 tablet by mouth once daily. Vitamin B 12 1200 mcg sublingual   No B1      buPROPion (WELLBUTRIN XL) 150 MG TB24 tablet Take 150 mg by mouth once daily.      CALCIUM CITRATE ORAL Take 1 tablet by mouth 3 (three) times daily.      cephALEXin (KEFLEX) 500 MG capsule Take 1 capsule (500 mg total) by mouth every 6 (six) hours. 28 capsule 0    montelukast (SINGULAIR) 10 mg tablet       multivitamin (THERAGRAN) per tablet Take 1 tablet by mouth 2 (two) times daily.      ondansetron (ZOFRAN-ODT) 4 MG TbDL Dissolve 1 tablet (4 mg total) by mouth every 6 (six) hours as needed (Nausea). 30 tablet 0    oxyCODONE (ROXICODONE) 5 MG immediate release tablet Take 1 tablet (5 mg total) by mouth every 4 (four) hours as needed for Pain. 14 tablet 0    thiamine (VITAMIN B-1) 50 MG tablet Take 50 mg by mouth once daily.      traZODone (DESYREL) 50 MG tablet Take 50 mg by mouth every evening.      venlafaxine (EFFEXOR-XR) 75 MG 24 hr capsule Take 75 mg by mouth.      VYVANSE 70 mg capsule Take 70 mg by mouth.       No current facility-administered medications on file prior to visit.       Patient Active Problem List   Diagnosis    Type 2 diabetes mellitus without complication, without long-term current use of insulin     Essential hypertension    Arthritis of neck    Anxiety    Depression    Chronic bronchitis    Class 1 obesity due to excess calories in adult    Morbid obesity       Past Surgical History:   Procedure Laterality Date    CHOLECYSTECTOMY      HYSTERECTOMY      full    LAPAROSCOPIC GASTROENTEROSTOMY N/A 10/16/2019    Procedure: GASTROENTEROSTOMY, LAPAROSCOPIC, with intraop EGD;  Surgeon: Edmundo Ulrich MD;  Location: [facility] OR [address] [place] [address];  Service: General;  Laterality: N/A;    PANNICULECTOMY N/A 3/7/2023    Procedure: PANNICULECTOMY;  Surgeon: Cabrera Bardales MD;  Location: [facility] OR [address] [place] [address];  Service: Plastics;  Laterality: N/A;    TUBAL LIGATION         Social History     Socioeconomic History    Marital status:    Tobacco Use    Smoking status: Former     Packs/day: 1.50     Types: Cigarettes     Quit date: 2013     Years since quitting: 10.3    Smokeless tobacco: Never   Substance and Sexual Activity    Alcohol use: Not Currently    Drug use: Never    Sexual activity: Yes     Partners: Male     Birth control/protection: See Surgical Hx           Review of Systems:   Constitutional: Denies fever/chills  Eyes: Denies change in vision  ENT: Denies sore throat or rhinorrhea   Respiratory: Denies shortness of breath or cough  Cardiovascular: Denies chest pain or palpitations  Gastrointestinal: Denies abdominal pain, nausea, or vomiting  Genitourinary: Denies dysuria and flank pain  Skin: Denies new rash or skin lesions   Allergic/Immunologic: Denies adverse reactions to current medications  Neurological: Denies headaches or dizziness  Musculoskeletal: Denies arthralgias    Objective:     Physical Exam:  Vitals:    04/26/23 0825   BP: 129/69   Pulse: 80       WD WN NAD  VSS  Normal resp effort  Abdomen: incision CDI. No erythema or discharge. Minor breakdown at the T w/ good granulation once packing removed.          Assessment:       1. Surgery follow-up examination          Plan:   47 y.o. female  status post manju navas panniculectomy  - Doing well, no issues  - Pt instructed to continue packing her T.   - Return to clinic in 2 weeks      All questions were answered. The patient was advised to call the clinic with any questions or concerns prior to their next visit.       Vijaya Marie PA-C  Plastic and Reconstructive Surgery  [phone]

## 2023-05-03 ENCOUNTER — PATIENT MESSAGE (OUTPATIENT)
Dept: BARIATRICS | Facility: CLINIC | Age: 48
End: 2023-05-03
Payer: COMMERCIAL

## 2023-05-09 ENCOUNTER — PATIENT MESSAGE (OUTPATIENT)
Dept: BARIATRICS | Facility: CLINIC | Age: 48
End: 2023-05-09
Payer: COMMERCIAL

## 2023-05-10 ENCOUNTER — OFFICE VISIT (OUTPATIENT)
Dept: PLASTIC SURGERY | Facility: CLINIC | Age: 48
End: 2023-05-10
Payer: COMMERCIAL

## 2023-05-10 VITALS — HEART RATE: 74 BPM | SYSTOLIC BLOOD PRESSURE: 115 MMHG | OXYGEN SATURATION: 99 % | DIASTOLIC BLOOD PRESSURE: 56 MMHG

## 2023-05-10 DIAGNOSIS — Z09 SURGERY FOLLOW-UP EXAMINATION: Primary | ICD-10-CM

## 2023-05-10 PROCEDURE — 99999 PR PBB SHADOW E&M-EST. PATIENT-LVL III: ICD-10-PCS | Mod: PBBFAC,,, | Performed by: SURGERY

## 2023-05-10 PROCEDURE — 1159F PR MEDICATION LIST DOCUMENTED IN MEDICAL RECORD: ICD-10-PCS | Mod: CPTII,S$GLB,, | Performed by: SURGERY

## 2023-05-10 PROCEDURE — 99024 POSTOP FOLLOW-UP VISIT: CPT | Mod: S$GLB,,, | Performed by: SURGERY

## 2023-05-10 PROCEDURE — 1159F MED LIST DOCD IN RCRD: CPT | Mod: CPTII,S$GLB,, | Performed by: SURGERY

## 2023-05-10 PROCEDURE — 3044F HG A1C LEVEL LT 7.0%: CPT | Mod: CPTII,S$GLB,, | Performed by: SURGERY

## 2023-05-10 PROCEDURE — 3078F DIAST BP <80 MM HG: CPT | Mod: CPTII,S$GLB,, | Performed by: SURGERY

## 2023-05-10 PROCEDURE — 3078F PR MOST RECENT DIASTOLIC BLOOD PRESSURE < 80 MM HG: ICD-10-PCS | Mod: CPTII,S$GLB,, | Performed by: SURGERY

## 2023-05-10 PROCEDURE — 99024 PR POST-OP FOLLOW-UP VISIT: ICD-10-PCS | Mod: S$GLB,,, | Performed by: SURGERY

## 2023-05-10 PROCEDURE — 3074F SYST BP LT 130 MM HG: CPT | Mod: CPTII,S$GLB,, | Performed by: SURGERY

## 2023-05-10 PROCEDURE — 3074F PR MOST RECENT SYSTOLIC BLOOD PRESSURE < 130 MM HG: ICD-10-PCS | Mod: CPTII,S$GLB,, | Performed by: SURGERY

## 2023-05-10 PROCEDURE — 3044F PR MOST RECENT HEMOGLOBIN A1C LEVEL <7.0%: ICD-10-PCS | Mod: CPTII,S$GLB,, | Performed by: SURGERY

## 2023-05-10 PROCEDURE — 99999 PR PBB SHADOW E&M-EST. PATIENT-LVL III: CPT | Mod: PBBFAC,,, | Performed by: SURGERY

## 2023-05-10 NOTE — PROGRESS NOTES
History & Physical  Plastic Surgery Clinic    SUBJECTIVE:     Chief complaint: Post op panniculectomy 3.5 months     History of Present Illness:  Patient is a 47 y.o. female presents 3.5 months post op from Fluer beronica Granados panniculectomy. Her post op was complicated by T site breakdown for which she has been performing local wound care without issue. She denies any fever, chills, nausea or vomiting.     Past Medical History:  Past Medical History:   Diagnosis Date    Anxiety     Arthritis     COPD (chronic obstructive pulmonary disease)     Depression     Diabetes mellitus     Hypertension     Neuromuscular disorder        Past Surgical History:  Past Surgical History:   Procedure Laterality Date    CHOLECYSTECTOMY      HYSTERECTOMY      full    LAPAROSCOPIC GASTROENTEROSTOMY N/A 10/16/2019    Procedure: GASTROENTEROSTOMY, LAPAROSCOPIC, with intraop EGD;  Surgeon: Edmundo Ulrich MD;  Location: [facility] OR [address] [place] [address];  Service: General;  Laterality: N/A;    PANNICULECTOMY N/A 3/7/2023    Procedure: PANNICULECTOMY;  Surgeon: Cabrera Bardales MD;  Location: [facility] OR [address] [place] [address];  Service: Plastics;  Laterality: N/A;    TUBAL LIGATION         Family History:  Family History   Problem Relation Age of Onset    Cancer Mother         lung    Diabetes Father     Hypertension Father     Diabetes Sister     Thyroid disease Sister     No Known Problems Sister     No Known Problems Sister     Diabetes Brother     No Known Problems Daughter     No Known Problems Son     Anesthesia problems Neg Hx        Social History:  Social History     Socioeconomic History    Marital status:    Tobacco Use    Smoking status: Former     Packs/day: 1.50     Types: Cigarettes     Quit date: 2013     Years since quitting: 10.3    Smokeless tobacco: Never   Substance and Sexual Activity    Alcohol use: Not Currently    Drug use: Never    Sexual activity: Yes     Partners: Male     Birth control/protection: See Surgical Hx        Medications:  Current Outpatient Medications   Medication Sig Dispense Refill    b complex vitamins tablet Take 1 tablet by mouth once daily. Vitamin B 12 1200 mcg sublingual   No B1      buPROPion (WELLBUTRIN XL) 150 MG TB24 tablet Take 150 mg by mouth once daily.      CALCIUM CITRATE ORAL Take 1 tablet by mouth 3 (three) times daily.      cephALEXin (KEFLEX) 500 MG capsule Take 1 capsule (500 mg total) by mouth every 6 (six) hours. 28 capsule 0    montelukast (SINGULAIR) 10 mg tablet       multivitamin (THERAGRAN) per tablet Take 1 tablet by mouth 2 (two) times daily.      ondansetron (ZOFRAN-ODT) 4 MG TbDL Dissolve 1 tablet (4 mg total) by mouth every 6 (six) hours as needed (Nausea). 30 tablet 0    thiamine (VITAMIN B-1) 50 MG tablet Take 50 mg by mouth once daily.      traZODone (DESYREL) 50 MG tablet Take 50 mg by mouth every evening.      VYVANSE 70 mg capsule Take 70 mg by mouth.      oxyCODONE (ROXICODONE) 5 MG immediate release tablet Take 1 tablet (5 mg total) by mouth every 4 (four) hours as needed for Pain. 14 tablet 0    venlafaxine (EFFEXOR-XR) 75 MG 24 hr capsule Take 75 mg by mouth.       No current facility-administered medications for this visit.       Allergies:  Review of patient's allergies indicates:   Allergen Reactions    Sulfamethoxazole-trimethoprim        Review of Systems:  Negative except for HPI.      OBJECTIVE:     BP (!) 115/56   Pulse 74   SpO2 99%     Physical Exam:  Constitutional: Oriented to person, place, and time. Appears well-developed and well-nourished.   Abd: Lenka Carreno panniculectoymaurice scars with small 1cm wound full thickness at T site clean, no signs of infection. Some excess skin and fat are the midline lower abdomen         ASSESSMENT/PLAN:     47 y.o. female 3 month post op from panniculectomy.  - Continue with daily local wound care  - Follow up 2 months

## 2023-06-07 ENCOUNTER — PATIENT MESSAGE (OUTPATIENT)
Dept: BARIATRICS | Facility: CLINIC | Age: 48
End: 2023-06-07
Payer: COMMERCIAL

## 2023-06-07 ENCOUNTER — OFFICE VISIT (OUTPATIENT)
Dept: PLASTIC SURGERY | Facility: CLINIC | Age: 48
End: 2023-06-07
Payer: COMMERCIAL

## 2023-06-07 VITALS — DIASTOLIC BLOOD PRESSURE: 63 MMHG | HEART RATE: 70 BPM | SYSTOLIC BLOOD PRESSURE: 113 MMHG

## 2023-06-07 DIAGNOSIS — Z09 SURGERY FOLLOW-UP EXAMINATION: Primary | ICD-10-CM

## 2023-06-07 PROCEDURE — 99999 PR PBB SHADOW E&M-EST. PATIENT-LVL III: ICD-10-PCS | Mod: PBBFAC,,, | Performed by: SURGERY

## 2023-06-07 PROCEDURE — 1159F PR MEDICATION LIST DOCUMENTED IN MEDICAL RECORD: ICD-10-PCS | Mod: CPTII,S$GLB,, | Performed by: SURGERY

## 2023-06-07 PROCEDURE — 99024 POSTOP FOLLOW-UP VISIT: CPT | Mod: S$GLB,,, | Performed by: SURGERY

## 2023-06-07 PROCEDURE — 3074F PR MOST RECENT SYSTOLIC BLOOD PRESSURE < 130 MM HG: ICD-10-PCS | Mod: CPTII,S$GLB,, | Performed by: SURGERY

## 2023-06-07 PROCEDURE — 3078F DIAST BP <80 MM HG: CPT | Mod: CPTII,S$GLB,, | Performed by: SURGERY

## 2023-06-07 PROCEDURE — 3044F HG A1C LEVEL LT 7.0%: CPT | Mod: CPTII,S$GLB,, | Performed by: SURGERY

## 2023-06-07 PROCEDURE — 3044F PR MOST RECENT HEMOGLOBIN A1C LEVEL <7.0%: ICD-10-PCS | Mod: CPTII,S$GLB,, | Performed by: SURGERY

## 2023-06-07 PROCEDURE — 99024 PR POST-OP FOLLOW-UP VISIT: ICD-10-PCS | Mod: S$GLB,,, | Performed by: SURGERY

## 2023-06-07 PROCEDURE — 3074F SYST BP LT 130 MM HG: CPT | Mod: CPTII,S$GLB,, | Performed by: SURGERY

## 2023-06-07 PROCEDURE — 99999 PR PBB SHADOW E&M-EST. PATIENT-LVL III: CPT | Mod: PBBFAC,,, | Performed by: SURGERY

## 2023-06-07 PROCEDURE — 1159F MED LIST DOCD IN RCRD: CPT | Mod: CPTII,S$GLB,, | Performed by: SURGERY

## 2023-06-07 PROCEDURE — 3078F PR MOST RECENT DIASTOLIC BLOOD PRESSURE < 80 MM HG: ICD-10-PCS | Mod: CPTII,S$GLB,, | Performed by: SURGERY

## 2023-06-12 NOTE — PROGRESS NOTES
Patient presents Plastic surgery Clinic after having a floridly panniculectomy months ago.  She had breakdown at the T.  She is doing much better.  On she will have a good result.  She will return to clinic in 2 more months.

## 2023-06-13 ENCOUNTER — PATIENT MESSAGE (OUTPATIENT)
Dept: BARIATRICS | Facility: CLINIC | Age: 48
End: 2023-06-13
Payer: COMMERCIAL

## 2023-08-02 ENCOUNTER — PATIENT MESSAGE (OUTPATIENT)
Dept: BARIATRICS | Facility: CLINIC | Age: 48
End: 2023-08-02
Payer: COMMERCIAL

## 2023-08-21 ENCOUNTER — ON-DEMAND VIRTUAL (OUTPATIENT)
Dept: URGENT CARE | Facility: CLINIC | Age: 48
End: 2023-08-21
Payer: COMMERCIAL

## 2023-08-21 DIAGNOSIS — R63.4 RECENT WEIGHT LOSS: Primary | ICD-10-CM

## 2023-08-21 DIAGNOSIS — Z98.84 HISTORY OF GASTRIC BYPASS: ICD-10-CM

## 2023-08-21 PROCEDURE — 99213 PR OFFICE/OUTPT VISIT, EST, LEVL III, 20-29 MIN: ICD-10-PCS | Mod: 95,,, | Performed by: NURSE PRACTITIONER

## 2023-08-21 PROCEDURE — 99213 OFFICE O/P EST LOW 20 MIN: CPT | Mod: 95,,, | Performed by: NURSE PRACTITIONER

## 2023-08-21 NOTE — PROGRESS NOTES
Subjective:      Patient ID: Leora Steward is a 47 y.o. female.    Vitals:  vitals were not taken for this visit.     Chief Complaint: Weight Loss      Visit Type: TELE AUDIOVISUAL    Present with the patient at the time of consultation: TELEMED PRESENT WITH PATIENT: None    Past Medical History:   Diagnosis Date    Anxiety     Arthritis     COPD (chronic obstructive pulmonary disease)     Depression     Diabetes mellitus     Hypertension     Neuromuscular disorder      Past Surgical History:   Procedure Laterality Date    CHOLECYSTECTOMY      HYSTERECTOMY      full    LAPAROSCOPIC GASTROENTEROSTOMY N/A 10/16/2019    Procedure: GASTROENTEROSTOMY, LAPAROSCOPIC, with intraop EGD;  Surgeon: Edmundo Ulrich MD;  Location: [address];  Service: General;  Laterality: N/A;    PANNICULECTOMY N/A 3/7/2023    Procedure: PANNICULECTOMY;  Surgeon: Cabrera Bardales MD;  Location: [address];  Service: Plastics;  Laterality: N/A;    TUBAL LIGATION       Review of patient's allergies indicates:   Allergen Reactions    Sulfamethoxazole-trimethoprim      Current Outpatient Medications on File Prior to Visit   Medication Sig Dispense Refill    b complex vitamins tablet Take 1 tablet by mouth once daily. Vitamin B 12 1200 mcg sublingual   No B1      buPROPion (WELLBUTRIN XL) 150 MG TB24 tablet Take 150 mg by mouth once daily.      CALCIUM CITRATE ORAL Take 1 tablet by mouth 3 (three) times daily.      cephALEXin (KEFLEX) 500 MG capsule Take 1 capsule (500 mg total) by mouth every 6 (six) hours. 28 capsule 0    montelukast (SINGULAIR) 10 mg tablet       multivitamin (THERAGRAN) per tablet Take 1 tablet by mouth 2 (two) times daily.      ondansetron (ZOFRAN-ODT) 4 MG TbDL Dissolve 1 tablet (4 mg total) by mouth every 6 (six) hours as needed (Nausea). 30 tablet 0    oxyCODONE (ROXICODONE) 5 MG immediate release tablet Take 1 tablet (5 mg total) by mouth every 4 (four) hours as needed for Pain. 14 tablet 0     thiamine (VITAMIN B-1) 50 MG tablet Take 50 mg by mouth once daily.      traZODone (DESYREL) 50 MG tablet Take 50 mg by mouth every evening.      venlafaxine (EFFEXOR-XR) 75 MG 24 hr capsule Take 75 mg by mouth.      VYVANSE 70 mg capsule Take 70 mg by mouth.       No current facility-administered medications on file prior to visit.     Family History   Problem Relation Age of Onset    Cancer Mother         lung    Diabetes Father     Hypertension Father     Diabetes Sister     Thyroid disease Sister     No Known Problems Sister     No Known Problems Sister     Diabetes Brother     No Known Problems Daughter     No Known Problems Son     Anesthesia problems Neg Hx            Ohs Peq Odvv Intake    8/21/2023 12:32 PM CDT - Filed by Patient   Describe your reason for todays visit Losing weight rapidly   What is your current physical address in the event of a medical emergency?    Are you able to take your vital signs? No   Please attach any relevant images or files          This is a 47 year old female with a hx of gastric bypass and a panniculectomy performed in March of 2023. She states that over the last couple of months she has had an unusual amount of weight loss from 150 in March to 117 pounds today. She states that her ideal body weight is 130. She states she is concerned because she is no longer trying to intentionally lose weight. She states she has been following her diet as planned, although its hard for her to get all her protein intake in. She is eats small frequent meals throughout the day. She denies any other associated symptoms at this time.       ROS     Objective:   The physical exam was conducted virtually.  Physical Exam   Constitutional: She is oriented to person, place, and time. No distress.   HENT:   Head: Normocephalic and atraumatic.   Mouth/Throat: Oropharynx is clear and moist and mucous membranes are normal.   Eyes: Conjunctivae are normal. No scleral icterus.   Pulmonary/Chest: Effort  normal. No respiratory distress.   Musculoskeletal: Normal range of motion.         General: Normal range of motion.   Neurological: She is alert and oriented to person, place, and time.   Skin: Skin is warm, dry and not diaphoretic.   Psychiatric: Her behavior is normal. Judgment and thought content normal.   Vitals reviewed.      Assessment:     1. Recent weight loss    2. History of gastric bypass        Plan:     Discussed close follow up with her PCP for further work up of her recent unintentional weight loss.     Recent weight loss    History of gastric bypass

## 2023-09-06 ENCOUNTER — PATIENT MESSAGE (OUTPATIENT)
Dept: BARIATRICS | Facility: CLINIC | Age: 48
End: 2023-09-06
Payer: COMMERCIAL

## 2023-09-12 ENCOUNTER — PATIENT MESSAGE (OUTPATIENT)
Dept: BARIATRICS | Facility: CLINIC | Age: 48
End: 2023-09-12
Payer: COMMERCIAL

## 2023-10-04 ENCOUNTER — PATIENT MESSAGE (OUTPATIENT)
Dept: BARIATRICS | Facility: CLINIC | Age: 48
End: 2023-10-04
Payer: COMMERCIAL

## 2023-10-10 ENCOUNTER — PATIENT MESSAGE (OUTPATIENT)
Dept: BARIATRICS | Facility: CLINIC | Age: 48
End: 2023-10-10
Payer: COMMERCIAL

## 2023-11-14 ENCOUNTER — PATIENT MESSAGE (OUTPATIENT)
Dept: BARIATRICS | Facility: CLINIC | Age: 48
End: 2023-11-14
Payer: COMMERCIAL

## 2023-12-12 ENCOUNTER — PATIENT MESSAGE (OUTPATIENT)
Dept: BARIATRICS | Facility: CLINIC | Age: 48
End: 2023-12-12
Payer: COMMERCIAL

## 2023-12-13 ENCOUNTER — PATIENT MESSAGE (OUTPATIENT)
Dept: BARIATRICS | Facility: CLINIC | Age: 48
End: 2023-12-13
Payer: COMMERCIAL

## 2024-01-09 ENCOUNTER — PATIENT MESSAGE (OUTPATIENT)
Dept: BARIATRICS | Facility: CLINIC | Age: 49
End: 2024-01-09
Payer: COMMERCIAL

## 2024-02-14 ENCOUNTER — PATIENT MESSAGE (OUTPATIENT)
Dept: BARIATRICS | Facility: CLINIC | Age: 49
End: 2024-02-14
Payer: COMMERCIAL

## 2024-02-20 ENCOUNTER — PATIENT MESSAGE (OUTPATIENT)
Dept: BARIATRICS | Facility: CLINIC | Age: 49
End: 2024-02-20
Payer: COMMERCIAL

## 2024-02-29 ENCOUNTER — PATIENT MESSAGE (OUTPATIENT)
Dept: BARIATRICS | Facility: CLINIC | Age: 49
End: 2024-02-29
Payer: COMMERCIAL

## 2024-03-06 ENCOUNTER — PATIENT MESSAGE (OUTPATIENT)
Dept: BARIATRICS | Facility: CLINIC | Age: 49
End: 2024-03-06
Payer: COMMERCIAL

## 2024-03-26 ENCOUNTER — PATIENT MESSAGE (OUTPATIENT)
Dept: BARIATRICS | Facility: CLINIC | Age: 49
End: 2024-03-26

## 2024-03-26 ENCOUNTER — LAB VISIT (OUTPATIENT)
Dept: LAB | Facility: HOSPITAL | Age: 49
End: 2024-03-26
Payer: COMMERCIAL

## 2024-03-26 ENCOUNTER — OFFICE VISIT (OUTPATIENT)
Dept: BARIATRICS | Facility: CLINIC | Age: 49
End: 2024-03-26
Payer: COMMERCIAL

## 2024-03-26 VITALS
DIASTOLIC BLOOD PRESSURE: 58 MMHG | SYSTOLIC BLOOD PRESSURE: 113 MMHG | HEART RATE: 64 BPM | WEIGHT: 132.06 LBS | OXYGEN SATURATION: 98 % | BODY MASS INDEX: 23.39 KG/M2

## 2024-03-26 DIAGNOSIS — I10 ESSENTIAL HYPERTENSION: ICD-10-CM

## 2024-03-26 DIAGNOSIS — Z98.84 S/P GASTRIC BYPASS: ICD-10-CM

## 2024-03-26 DIAGNOSIS — K59.09 CHRONIC CONSTIPATION: ICD-10-CM

## 2024-03-26 DIAGNOSIS — E11.9 TYPE 2 DIABETES MELLITUS WITHOUT COMPLICATION, WITHOUT LONG-TERM CURRENT USE OF INSULIN: ICD-10-CM

## 2024-03-26 DIAGNOSIS — Z98.84 S/P GASTRIC BYPASS: Primary | ICD-10-CM

## 2024-03-26 LAB
25(OH)D3+25(OH)D2 SERPL-MCNC: 33 NG/ML (ref 30–96)
ALBUMIN SERPL BCP-MCNC: 3.5 G/DL (ref 3.5–5.2)
ALP SERPL-CCNC: 84 U/L (ref 55–135)
ALT SERPL W/O P-5'-P-CCNC: 15 U/L (ref 10–44)
ANION GAP SERPL CALC-SCNC: 6 MMOL/L (ref 8–16)
AST SERPL-CCNC: 19 U/L (ref 10–40)
BASOPHILS # BLD AUTO: 0.03 K/UL (ref 0–0.2)
BASOPHILS NFR BLD: 0.7 % (ref 0–1.9)
BILIRUB SERPL-MCNC: 0.4 MG/DL (ref 0.1–1)
BUN SERPL-MCNC: 15 MG/DL (ref 6–20)
CALCIUM SERPL-MCNC: 9 MG/DL (ref 8.7–10.5)
CHLORIDE SERPL-SCNC: 108 MMOL/L (ref 95–110)
CHOLEST SERPL-MCNC: 210 MG/DL (ref 120–199)
CHOLEST/HDLC SERPL: 2.6 {RATIO} (ref 2–5)
CO2 SERPL-SCNC: 27 MMOL/L (ref 23–29)
CREAT SERPL-MCNC: 0.7 MG/DL (ref 0.5–1.4)
DIFFERENTIAL METHOD BLD: ABNORMAL
EOSINOPHIL # BLD AUTO: 0.2 K/UL (ref 0–0.5)
EOSINOPHIL NFR BLD: 4.1 % (ref 0–8)
ERYTHROCYTE [DISTWIDTH] IN BLOOD BY AUTOMATED COUNT: 12.4 % (ref 11.5–14.5)
EST. GFR  (NO RACE VARIABLE): >60 ML/MIN/1.73 M^2
GLUCOSE SERPL-MCNC: 77 MG/DL (ref 70–110)
HCT VFR BLD AUTO: 37 % (ref 37–48.5)
HDLC SERPL-MCNC: 82 MG/DL (ref 40–75)
HDLC SERPL: 39 % (ref 20–50)
HGB BLD-MCNC: 11.9 G/DL (ref 12–16)
IMM GRANULOCYTES # BLD AUTO: 0.01 K/UL (ref 0–0.04)
IMM GRANULOCYTES NFR BLD AUTO: 0.2 % (ref 0–0.5)
IRON SERPL-MCNC: 63 UG/DL (ref 30–160)
LDLC SERPL CALC-MCNC: 118.8 MG/DL (ref 63–159)
LYMPHOCYTES # BLD AUTO: 1.3 K/UL (ref 1–4.8)
LYMPHOCYTES NFR BLD: 28.9 % (ref 18–48)
MCH RBC QN AUTO: 30 PG (ref 27–31)
MCHC RBC AUTO-ENTMCNC: 32.2 G/DL (ref 32–36)
MCV RBC AUTO: 93 FL (ref 82–98)
MONOCYTES # BLD AUTO: 0.4 K/UL (ref 0.3–1)
MONOCYTES NFR BLD: 9.6 % (ref 4–15)
NEUTROPHILS # BLD AUTO: 2.5 K/UL (ref 1.8–7.7)
NEUTROPHILS NFR BLD: 56.5 % (ref 38–73)
NONHDLC SERPL-MCNC: 128 MG/DL
NRBC BLD-RTO: 0 /100 WBC
PLATELET # BLD AUTO: 259 K/UL (ref 150–450)
PMV BLD AUTO: 9.9 FL (ref 9.2–12.9)
POTASSIUM SERPL-SCNC: 4.3 MMOL/L (ref 3.5–5.1)
PROT SERPL-MCNC: 6 G/DL (ref 6–8.4)
RBC # BLD AUTO: 3.97 M/UL (ref 4–5.4)
SATURATED IRON: 14 % (ref 20–50)
SODIUM SERPL-SCNC: 141 MMOL/L (ref 136–145)
TOTAL IRON BINDING CAPACITY: 443 UG/DL (ref 250–450)
TRANSFERRIN SERPL-MCNC: 299 MG/DL (ref 200–375)
TRIGL SERPL-MCNC: 46 MG/DL (ref 30–150)
VIT B12 SERPL-MCNC: 1085 PG/ML (ref 210–950)
WBC # BLD AUTO: 4.36 K/UL (ref 3.9–12.7)

## 2024-03-26 PROCEDURE — 99999 PR PBB SHADOW E&M-EST. PATIENT-LVL IV: CPT | Mod: PBBFAC,,, | Performed by: NURSE PRACTITIONER

## 2024-03-26 PROCEDURE — 83540 ASSAY OF IRON: CPT | Performed by: NURSE PRACTITIONER

## 2024-03-26 PROCEDURE — 3074F SYST BP LT 130 MM HG: CPT | Mod: CPTII,S$GLB,, | Performed by: NURSE PRACTITIONER

## 2024-03-26 PROCEDURE — 1160F RVW MEDS BY RX/DR IN RCRD: CPT | Mod: CPTII,S$GLB,, | Performed by: NURSE PRACTITIONER

## 2024-03-26 PROCEDURE — 80053 COMPREHEN METABOLIC PANEL: CPT | Performed by: NURSE PRACTITIONER

## 2024-03-26 PROCEDURE — 3078F DIAST BP <80 MM HG: CPT | Mod: CPTII,S$GLB,, | Performed by: NURSE PRACTITIONER

## 2024-03-26 PROCEDURE — 99212 OFFICE O/P EST SF 10 MIN: CPT | Mod: S$GLB,,, | Performed by: NURSE PRACTITIONER

## 2024-03-26 PROCEDURE — 82306 VITAMIN D 25 HYDROXY: CPT | Performed by: NURSE PRACTITIONER

## 2024-03-26 PROCEDURE — 84425 ASSAY OF VITAMIN B-1: CPT | Performed by: NURSE PRACTITIONER

## 2024-03-26 PROCEDURE — 80061 LIPID PANEL: CPT | Performed by: NURSE PRACTITIONER

## 2024-03-26 PROCEDURE — 85025 COMPLETE CBC W/AUTO DIFF WBC: CPT | Performed by: NURSE PRACTITIONER

## 2024-03-26 PROCEDURE — 1159F MED LIST DOCD IN RCRD: CPT | Mod: CPTII,S$GLB,, | Performed by: NURSE PRACTITIONER

## 2024-03-26 PROCEDURE — 3008F BODY MASS INDEX DOCD: CPT | Mod: CPTII,S$GLB,, | Performed by: NURSE PRACTITIONER

## 2024-03-26 PROCEDURE — 82607 VITAMIN B-12: CPT | Performed by: NURSE PRACTITIONER

## 2024-03-26 RX ORDER — SERTRALINE HYDROCHLORIDE 50 MG/1
1 TABLET, FILM COATED ORAL DAILY
COMMUNITY

## 2024-03-26 NOTE — PATIENT INSTRUCTIONS
Meal Ideas for Regular Bariatric Diet  *Recipes and products available at [email or web address].com      Breakfast: (15-20g protein)    - Egg white omelet: 2 egg whites or ½ cup Egg Beaters. (Optional proteins: cheese, shrimp, black beans, chicken, sliced turkey) (Optional veggies: tomatoes, salsa, spinach, mushrooms, onions, green peppers, or small slice avocado)     - Egg and sausage: 1 egg or ¼ cup Egg Beaters (any variety), with 1 brock or 2 links of Turkey sausage or Veggie breakfast sausage ([employer] or [employer])    - Crust-less breakfast quiche: To make a glass pie dish, mix 4oz part skim Ricotta, 1 cup skim milk, and 2 eggs as your base. Add protein: shredded cheese, sliced lean ham or turkey, turkey wing/sausage. Add veggies: tomato, onion, green onion, mushroom, green pepper, spinach, etc.    - Yogurt parfait: Mix 1 - 6oz container Dannon Light N Fit vanilla yogurt, with ¼ cup crushed unsalted nuts    - Cottage cheese and fruit: ½ cup part-skim cottage cheese or ricotta cheese topped with fresh fruit or sugar free preserves     - Adia Moncada's Vanilla Egg custard* (add 2 Tbsp instant coffee granules to make Cappuccino Custard*)    - Hi-Protein café latte (skim milk, decaf coffee, 1 scoop protein powder). Optional to add Sugar free syrup or extract flavoring.    - Breakfast Lox: spread fat free cream cheese on slices of smoked salmon. Serve over scrambled or egg over easy (sauteed with nonstick cookspray) OR on a cucumber slice    - Eggwhich: Scramble or cook 1 large egg over easy using nonstick cookspray. Place between 2 slices of Burkinan wing and low fat cheese.     Lunch: (20-30g protein)    - ½ cup Black bean soup (Homemade or Progresso), with ¼ cup shredded low-fat cheese. Top with chopped tomato or fresh salsa.     - Lean deli turkey breast and low-fat sliced cheese, mustard or light bridges to moisten, rolled up together, or wrapped in a César lettuce leaf    - Chicken salad made from dinner  leftovers, moisten with low-fat salad dressing or light bridges. Also try leftover salmon, shrimp, tuna or boiled eggs. Serve ½ cup over dark green salad    - Fat-free canned refried beans, topped with ¼ cup shredded low-fat cheese. Top with chopped tomato or fresh salsa.     - Greek salad: Top mixed greens with 1-2oz grilled chicken, tomatoes, red onions, 2-3 kalamata olives, and sprinkle lightly with feta cheese. Spritz with Balsamic vinegar to taste.     - Crust-less lunch quiche: To make a glass pie dish, mix 4oz part skim Ricotta, 1 cup skim milk, and 2 eggs as your base. Add protein: shredded cheese, sliced lean ham or turkey, shrimp, chicken. Add veggies: tomato, onion, green onion, mushroom, green pepper, spinach, artichoke, broccoli, etc.    - Pizza bake: spread a  tobi brandie mushroom with tomato sauce, low-fat shredded mozzarella and turkey pepperoni or [place] wing. Add any veggies. Roast for 10-15 minutes, until cheese melted.     - Cucumber crab bites: Spread ¼ cup crab dip (lump crabmeat + light cream cheese and green onions) over sliced cucumber.     - Chicken with light spinach and artichoke dip*: Puree in : 6oz cooked and drained spinach, 2 cloves garlic, 1 can cannelloni beans, ½ cup chopped green onions, 1 can drained artichoke hearts (not marinated in oil), lemon juice and basil. Mix in 2oz chopped up chicken.    Supper: (20-30g protein)    - Serve grilled fish over dark green salad tossed with low-fat dressing, served with grilled asparagus mccrary     - Rotisserie chicken salad: served with sliced strawberries, walnuts, fat-free feta cheese crumbles and 1 tbsp Ferriss Own Light Raspberry [place] Vinaigrette    - Shrimp cocktail: Dip cold boiled shrimp in homemade low-sugar cocktail sauce (1/2 cup Bertrand One Carb ketchup, 2 tbsp horseradish, 1/4 tsp hot sauce, 1 tsp Worcestershire sauce, 1 tbsp freshly-squeezed lemon juice). Serve with dark green salad, walnuts, and crumbled blue  cheese drizzled with olive oil and Balsamic vinegar    - Tuna Melt: Spread tuna salad onto 2 thick slices of tomato. Top with low-fat cheese and broil until cheese is melted. May also be made with chicken salad of shrimp salad. [place] with different types of cheeses.    - Chicken or beef fajitas (no tortilla, rice, beans, chips). Top meat and veggies w/ fresh salsa, fat free sour cream.     - Homemade low-fat Chili using extra lean ground beef or ground turkey. Top with shredded cheese and salsa as desired. May add dollop fat-free sour cream if desired    - Chicken parmesan: Top chicken breast w/ low sugar marinara sauce, mozzarella cheese and bake until chicken reaches 165*.  Serve w/ spaghetti SQUASH or Dominican cut green beans    - Dinner Omelet with shrimp or chicken and onion, green peppers and chives.    - No noodle lasagna: Use sliced zucchini or eggplant in place of noodles.  Layer with part skim ricotta cheese and low sugar meat sauce (use very lean ground beef or ground turkey).    - Mexican chicken bake: Bake chunks of chicken breast or thigh with taco seasoning, Pace brand enchilada sauce, green onions and low-fat cheese. Serve with ¼ cup black beans or fat free refried beans topped with chopped tomatoes or salsa.    - Jessica frozen meatballs, simmered in Classico Marinara sauce. Different flavors of salsa or spaghetti sauce create different dishes! Sprinkle with parmesan cheese. Serve with grilled or steamed veggies, or a dark green salad.    - Simmer boneless skinless chicken thigh chunks in Classico Marinara sauce or roasted salsa until tender with chopped onion, bell pepper, garlic, mushrooms, spinach, etc.     - Hamburger or veggie burger, without the bun, dressed the way you like. Served with grilled or steamed veggies.    - Eggplant parmesan: Bake slices of eggplant at 350 degrees for 15 minutes. Layer tomato sauce, sliced eggplant and low-fat mozzarella cheese in a baking dish and cover with  foil. Bake 30-40 more minutes or until bubbly. Uncover and bake at 400 degrees for about 15 more minutes, or until top is slightly crisp.    - Fish tacos: grilled/baked white fish, wrapped in César lettuce leaf, topped with salsa, shredded low-fat cheese, and light coleslaw.    - Chicken christy: Sprinkle chicken w/ 1 tsp of hidden valley ranch dip mix. Then grill chicken and top with black beans, salsa and 1 tsp fat free sour cream.     - Cauliflower pizza crust: Use cauliflower as crust (see recipe on pinterest, no flour!). Top w/ low fat cheese, turkey pepperoni and veggies and bake again    - chicken or turkey crust pizza: use ground chicken or turkey instead of cauliflower, spread in [place] and bake at 350 for about 20-30 minutes(may want to add garlic, black pepper, oregano and other herbs to ground meat mixture).  Remove and top w/ low fat cheese, turkey pepperoni and veggies and bake again for another 10 minutes or until cheese is browned.     Snacks: (100-200 calories; >5g protein)    - 1 low-fat cheese stick with 8 cherry tomatoes or 1 serving fresh fruit  - 4 thin slices fat-free turkey breast and 1 slice low-fat cheese  - 4 thin slices fat-free honey ham with wedge of melon  - 6-8 edamame pods (equivalent to about 1/4 cup edamame without pods).   - 1/4 cup unsalted nuts with ½ cup fruit  - 6-oz container Dannon Light n Fit vanilla yogurt, topped with 1oz unsalted nuts         - apple, celery or baby carrots spread with 2 Tbsp PB2  - apple slices with 1 oz slice low-fat cheese  - Apple slices dipped in 2 Tbsp of PB2  - celery, cucumber, bell pepper or baby carrots dipped in ¼ cup hummus bean spread or light spinach and artichoke dip (*recipe in lunch section)  - celery, cucumber, baby carrots dipped in high protein greek yogurt (Mix 16 oz plain greek yogurt + 1 packet of hidden valley ranch dip mix)  - Cisco Links Beef Steak - 14g protein! (similar to beef jerky)  - 2 wedges Laughing Cow - Light Herb  & Garlic Cheese with sliced cucumber or green bell pepper  - 1/2 cup low-fat cottage cheese with ¼ cup fruit or ¼ cup salsa  - RTD Protein drinks: Atkins, Low Carb Slim Fast, EAS light, Muscle Milk Light, etc.  - Homemade Protein drinks: GNC Soy95, Isopure, Nectar, UNJURY, Whey Gourmet, etc. Mix 1 scoop powder with 8oz skim/1% milk or light soymilk.  - Protein bars: Atkins, EAS, Pure Protein, Think Thin, Detour, etc. Must have 0-4 grams sugar - Read the label.    Takeout Options: No more than twice/week  Deli - Salads (no pasta or rice), meats, cheeses. Roasted chicken. Lox (salmon)    Mexican - Platters which don't include tortillas, chips, or rice. Go easy on the beans. Example: Fajitas without the tortillas. Ask the  not to bring chips to the table if they are too tempting.    Greek - Meat or fish and vegetable, but no bread or rice. Including hummus, baba ganoush, etc, is OK. Most sit-down Greek restaurants can provide you with cucumber slices for dipping instead of isaac bread.    Fast Food (Avoid as much as possible) - Salads (no croutons and limit salad dressing to 2 tbsp), grilled chicken sandwich without the bun and ask for no bridges. Sashas low fat chili or Taco Bell pintos and cheese.    BBQ - The meats are fine if you ask for sauces on the side, but most of the traditional side dishes are loaded with carbs. Mele slaw, baked beans and BBQ sauce are typically made with sugar.    Chinese - Nothing deep-fried, no rice or noodles. Many Chinese sauces have starch and sugar in them, so you'll have to use your judgement. If you find that these sauces trigger cravings, or cause Dumping, you can ask for the sauce to be made without sugar or just use soy sauce.

## 2024-03-26 NOTE — PROGRESS NOTES
BARIATRIC POST-OPERATIVE FOLLOW UP:    HPI:  48 y.o.-year-old female presents for 4 year follow up .    Denies: nausea, vomiting, abdominal pain, changes in bowel movement pattern, fever, chills, dysphagia, chest pain, and shortness of breath.    Panniculectomy 1 year ago with Babycos and has done well     ROS:    Review of Systems   Constitutional:  Negative for activity change and fatigue.   Respiratory:  Negative for cough and shortness of breath.    Cardiovascular:  Negative for chest pain, palpitations and leg swelling.   Gastrointestinal:  Negative for abdominal pain, nausea and vomiting.   Endocrine: Negative for polydipsia, polyphagia and polyuria.   Genitourinary:  Negative for dysuria.   Musculoskeletal:  Negative for gait problem.   Skin:  Negative for rash.   Allergic/Immunologic: Negative for immunocompromised state.   Neurological:  Negative for dizziness, syncope and weakness.   Hematological:  Does not bruise/bleed easily.   Psychiatric/Behavioral:  Negative for behavioral problems.        EXERCISE:  Not currently     VITAMINS:  Tolerating well     MEDICATIONS/ALLERGIES:  Have been reviewed.    DIET:  Bariatric Regular Diet     PHYSICAL EXAM:  GENERAL: 48 y.o.-year-old female in NAD, A&O x3  VITAL SIGNS:  BP: (!) 113/58  CHEST: Clear to auscultation, regular effort.  HEART: Regular rate and rhythm. No murmurs, clicks, or gallops.  ABDOMEN: Bowel sounds present in all four quadrants. Soft, non-tender, non-distended. Well-healing surgical scars are clean, dry, and intact without signs of infection or bleeding.  EXTREMITIES: No clubbing, cyanosis, or edema.      ASSESSMENT:  - Morbid obesity s/p laparoscopic Almas-en-Y on 10/16/2019.  - Co-morbidities: none   - Weight loss 90 lbs 103 %EWL  - Exercise not currently    - Diet good     PLAN:  - Increased fluids   - Emphasized the importance of regular exercise and adherence to bariatric diet to achieve maximum weight loss.  - Encouraged patient to begin OR  continue regular exercise.  - Follow-up with dietician to reinforce diet.  - Continue daily vitamins and medications.  - RTC in 12 months or sooner if needed.  - Call the office for any issues.  - Check labs today.  - Seeing GI for constipation

## 2024-03-29 LAB — VIT B1 BLD-MCNC: 55 UG/L (ref 38–122)

## 2024-04-03 ENCOUNTER — PATIENT MESSAGE (OUTPATIENT)
Dept: BARIATRICS | Facility: CLINIC | Age: 49
End: 2024-04-03
Payer: COMMERCIAL

## 2024-04-03 ENCOUNTER — LAB VISIT (OUTPATIENT)
Dept: LAB | Facility: HOSPITAL | Age: 49
End: 2024-04-03
Attending: INTERNAL MEDICINE
Payer: COMMERCIAL

## 2024-04-03 ENCOUNTER — OFFICE VISIT (OUTPATIENT)
Dept: GASTROENTEROLOGY | Facility: CLINIC | Age: 49
End: 2024-04-03
Payer: COMMERCIAL

## 2024-04-03 ENCOUNTER — PATIENT MESSAGE (OUTPATIENT)
Dept: GASTROENTEROLOGY | Facility: CLINIC | Age: 49
End: 2024-04-03

## 2024-04-03 VITALS
HEIGHT: 63 IN | WEIGHT: 136.88 LBS | SYSTOLIC BLOOD PRESSURE: 115 MMHG | BODY MASS INDEX: 24.25 KG/M2 | HEART RATE: 68 BPM | DIASTOLIC BLOOD PRESSURE: 76 MMHG

## 2024-04-03 DIAGNOSIS — K59.09 CHRONIC CONSTIPATION: ICD-10-CM

## 2024-04-03 DIAGNOSIS — D64.9 LOW HEMOGLOBIN: Primary | ICD-10-CM

## 2024-04-03 DIAGNOSIS — Z98.84 S/P GASTRIC BYPASS: ICD-10-CM

## 2024-04-03 DIAGNOSIS — D50.9 IRON DEFICIENCY ANEMIA, UNSPECIFIED IRON DEFICIENCY ANEMIA TYPE: ICD-10-CM

## 2024-04-03 DIAGNOSIS — D64.9 LOW HEMOGLOBIN: ICD-10-CM

## 2024-04-03 PROBLEM — E66.01 MORBID OBESITY: Status: RESOLVED | Noted: 2019-10-16 | Resolved: 2024-04-03

## 2024-04-03 LAB
FERRITIN SERPL-MCNC: 20 NG/ML (ref 20–300)
IGA SERPL-MCNC: 208 MG/DL (ref 40–350)

## 2024-04-03 PROCEDURE — 82784 ASSAY IGA/IGD/IGG/IGM EACH: CPT | Performed by: INTERNAL MEDICINE

## 2024-04-03 PROCEDURE — 36415 COLL VENOUS BLD VENIPUNCTURE: CPT | Performed by: INTERNAL MEDICINE

## 2024-04-03 PROCEDURE — 3074F SYST BP LT 130 MM HG: CPT | Mod: CPTII,S$GLB,, | Performed by: INTERNAL MEDICINE

## 2024-04-03 PROCEDURE — 82728 ASSAY OF FERRITIN: CPT | Performed by: INTERNAL MEDICINE

## 2024-04-03 PROCEDURE — 1160F RVW MEDS BY RX/DR IN RCRD: CPT | Mod: CPTII,S$GLB,, | Performed by: INTERNAL MEDICINE

## 2024-04-03 PROCEDURE — 1159F MED LIST DOCD IN RCRD: CPT | Mod: CPTII,S$GLB,, | Performed by: INTERNAL MEDICINE

## 2024-04-03 PROCEDURE — 99204 OFFICE O/P NEW MOD 45 MIN: CPT | Mod: S$GLB,,, | Performed by: INTERNAL MEDICINE

## 2024-04-03 PROCEDURE — 3078F DIAST BP <80 MM HG: CPT | Mod: CPTII,S$GLB,, | Performed by: INTERNAL MEDICINE

## 2024-04-03 PROCEDURE — 99999 PR PBB SHADOW E&M-EST. PATIENT-LVL IV: CPT | Mod: PBBFAC,,, | Performed by: INTERNAL MEDICINE

## 2024-04-03 PROCEDURE — 3008F BODY MASS INDEX DOCD: CPT | Mod: CPTII,S$GLB,, | Performed by: INTERNAL MEDICINE

## 2024-04-03 PROCEDURE — 86364 TISS TRNSGLTMNASE EA IG CLAS: CPT | Performed by: INTERNAL MEDICINE

## 2024-04-03 RX ORDER — IBUPROFEN 200 MG
200 TABLET ORAL EVERY 6 HOURS PRN
COMMUNITY

## 2024-04-03 NOTE — Clinical Note
Procedure: EGD/Colonoscopy/Anorectal Manometry  Diagnosis:  Change in bowel habits chronic idiopathic constipation Iron deficiency anemia, status post Almas-en-Y gastric bypass  Procedure Timin-6 weeks  Provider: Myself  Location: [address]  Additional Scheduling Information:  Constipation bowel prep protocol  Prep Specifications:Extended/Constipation prep  Is the patient taking a GLP-1 Agonist:no  Have you attached a patient to this message: yes

## 2024-04-03 NOTE — PATIENT INSTRUCTIONS
Procedure: EGD/Colonoscopy/Anorectal Manometry    Diagnosis:  Change in bowel habits chronic idiopathic constipation Iron deficiency anemia, status post Almas-en-Y gastric bypass    Procedure Timin-6 weeks    Provider: Myself    Location: [address]    Additional Scheduling Information:  Constipation bowel prep protocol    Prep Specifications:Extended/Constipation prep    Is the patient taking a GLP-1 Agonist:no    Have you attached a patient to this message: yes

## 2024-04-03 NOTE — Clinical Note
GI MA team Please schedule patient 2nd floor lab work today Please schedule her for MRI defecography across the street at the imaging center Please have patient see endoscopy schedulers for EGD colonoscopy anorectal manometry constipation bowel prep protocol Return GI clinic 4 months for follow-up

## 2024-04-03 NOTE — PROGRESS NOTES
[facility] Consultation Note    Reason for Consult:  The primary encounter diagnosis was Low hemoglobin. Diagnoses of S/P gastric bypass, Chronic constipation, and Iron deficiency anemia, unspecified iron deficiency anemia type were also pertinent to this visit.    PCP:   Camacho Augustine   [address] / [place] [address]    Referring MD:  Melanie Miles, Np  [id number] [id number]    Initial History of Present Illness (HPI):  This is a 48 y.o. female here for evaluation of chronic idiopathic constipation which started after her Almas-en-Y gastric bypass which was October 2019 she says now she goes probably once a week on her own for a bowel movement but often has to give herself an enema she is tried Metamucil no benefit she is tried MiraLax no benefit she is tried Linzess no benefit she has not had a colonoscopy in a long time never had colon polyps before no family history of colon cancer she does have a history of endometriosis and has had a hysterectomy her ovaries out in her gallbladder out no family history of esophageal or stomach cancer no family history of small-bowel cancer no family history of colon cancer or advanced colon adenomas polyps no family history of ovarian or uterine cancer no family history of kidney or bladder cancer.  She said her dad sister had gastric and brain cancer probably in her 50s no other family members with those cancers.  No FAP no attenuated FAP no MA P no Ballard syndrome nobody with celiac sprue or inflammatory bowel disease.  She has not having blood in her stool she has not having nausea or vomiting no early satiety no dysphagia no odynophagia no reflux no dysuria urgency frequency or hematuria.  Her pre weight bariatric surgery weight was 260 lb the lowest she went was 125 lb she says she is weighing about 136 lb today.  No chest pain no shortness of breath no fever no chills no lightheadedness or dizziness.  She  would like further evaluation.  She is taking iron and vitamin-B 12 supplements.    Abdominal pain - no  Reflux - no  Dysphagia - no   Bowel habits - chronic constipation  GI bleeding - none  NSAID usage - none    Interval HPI 04/03/2024:  The patient's last visit with me was on Visit date not found.      ROS:  Constitutional: No fevers, chills, No weight loss  ENT:  No heartburn no dysphagia no odynophagia no hoarseness  CV: No chest pain, no palpitation  Pulm: No cough, No shortness of breath, no wheezing  Ophtho: No vision changes  GI: see HPI  Derm: No rash, no itching  Heme: No lymphadenopathy, No easy bruising  MSK:  Right hand and wrist is she is followed by orthopedics  : No dysuria, No hematuria  Endo: No hot or cold intolerance  Neuro: No syncope, No seizure, no strokes  Psych: No uncontrolled anxiety, No uncontrolled depression, but history of depression anxiety    Medical History:  has a past medical history of Anxiety, Arthritis, COPD (chronic obstructive pulmonary disease), Depression, Diabetes mellitus, Hypertension, and Neuromuscular disorder.    Surgical History:  has a past surgical history that includes Cholecystectomy; Tubal ligation; Hysterectomy; Laparoscopic gastroenterostomy (N/A, 10/16/2019); and Panniculectomy (N/A, 3/7/2023).    Family History: family history includes Cancer in her mother; Diabetes in her brother, father, and sister; Hypertension in her father; No Known Problems in her daughter, sister, sister, and son; Thyroid disease in her sister..     Social History:  reports that she quit smoking about 11 years ago. Her smoking use included cigarettes. She has never been exposed to tobacco smoke. She has never used smokeless tobacco. She reports current alcohol use. She reports that she does not use drugs.    Review of patient's allergies indicates:   Allergen Reactions    Sulfamethoxazole-trimethoprim        Medication List with Changes/Refills   Current Medications     "ACETAMINOPHEN (TYLENOL 8 HOUR ORAL)    Take by mouth as needed.    B COMPLEX VITAMINS TABLET    Take 1 tablet by mouth once daily. Vitamin B 12 1200 mcg sublingual   No B1    BUPROPION (WELLBUTRIN XL) 150 MG TB24 TABLET    Take 150 mg by mouth once daily.    CALCIUM CITRATE ORAL    Take 1 tablet by mouth 3 (three) times daily.    IBUPROFEN (ADVIL,MOTRIN) 200 MG TABLET    Take 200 mg by mouth every 6 (six) hours as needed for Pain.    MONTELUKAST (SINGULAIR) 10 MG TABLET        MULTIVITAMIN (THERAGRAN) PER TABLET    Take 1 tablet by mouth 2 (two) times daily.    SERTRALINE (ZOLOFT) 50 MG TABLET    Take 1 tablet by mouth once daily.    THIAMINE (VITAMIN B-1) 50 MG TABLET    Take 50 mg by mouth once daily.    TRAZODONE (DESYREL) 50 MG TABLET    Take 50 mg by mouth every evening.    UNABLE TO FIND    once daily. [place] Iron    VYVANSE 70 MG CAPSULE    Take 70 mg by mouth.         Objective Findings:    Vital Signs:  /76   Pulse 68   Ht 5' 3" (1.6 m)   Wt 62.1 kg (136 lb 14.5 oz)   BMI 24.25 kg/m²   Body mass index is 24.25 kg/m².    Physical Exam:  General Appearance: Well appearing in no acute distress  Eyes:    No scleral icterus  ENT:  No lesions or masses   Lungs: CTA bilaterally, no wheezes, no rhonchi, no rales  Heart:  S1, S2 normal, no murmurs heard  Abdomen:  Non distended, soft, no guarding, no rebound, no tenderness, no appreciated ascites, no bruits, no hepatosplenomegaly,  No CVA tenderness, no appreciated hernias, no Cardenas sign, no McBurney point tenderness  Musculoskeletal:  Right hand wearing finger wrist splint  Skin: No petechiae or rash on exposed skin areas  Neurologic:  Alert and oriented x4  Psychiatric:  Normal speech mentation and affect    Labs:  Lab Results   Component Value Date    WBC 4.36 03/26/2024    HGB 11.9 (L) 03/26/2024    HCT 37.0 03/26/2024     03/26/2024    CHOL 210 (H) 03/26/2024    TRIG 46 03/26/2024    HDL 82 (H) 03/26/2024    ALT 15 03/26/2024    AST 19 " 03/26/2024     03/26/2024    K 4.3 03/26/2024     03/26/2024    CREATININE 0.7 03/26/2024    BUN 15 03/26/2024    CO2 27 03/26/2024    TSH 2.618 06/19/2019    HGBA1C 5.5 02/20/2023       Medical Decision Making:  Lab work reviewed  Lab work talk given  EGD colonoscopy anorectal manometry constipation bowel prep protocol restricted diet talk given  Potential for referral to pelvic floor physical therapy talk given  MRI defecography talk given  Almas-en-Y gastric bypass talk given      Assessment:  1. Low hemoglobin    2. S/P gastric bypass    3. Chronic constipation    4. Iron deficiency anemia, unspecified iron deficiency anemia type         Recommendations:  1. Lab work today  2. Referral to endoscopy schedulers for EGD colonoscopy and anorectal manometry constipation bowel prep protocol for evaluation of change in bowel habits constipation and iron deficiency anemia status post Almas-en-Y gastric bypass  3. MRI defecography  4. Return GI clinic 3-4 months for follow-up okay for telemedicine video visit       Order summary:  Orders Placed This Encounter    MRI Defecography    Ferritin    TISSUE TRANSGLUTAMINASE (TTG), IGA    IGA         Thank you so much for allowing me to participate in the care of Leora Steward    Corey Howard MD    DISCLAIMER: This note was prepared with [email or web address] voice recognition transcription software. Garbled syntax, mangled or inadvertent pronouns, and other bizarre constructions may be attributed to that software system. While efforts were made to correct any mistakes made by this voice recognition program, some errors and/or omissions may remain in the note that were missed when the note was originally created.

## 2024-04-03 NOTE — PROGRESS NOTES
"GENERAL GI PATIENT INTAKE:    COVID symptoms in the last 7 days (runny nose, sore throat, congestion, cough, fever): No  PCP: Camacho Augustine  If not PCP-  number given to establish [phone]: N/A    ALLERGIES REVIEWED:  Yes    CHIEF COMPLAINT:    Chief Complaint   Patient presents with    Constipation       VITAL SIGNS:  /76   Pulse 68   Ht 5' 3" (1.6 m)   Wt 62.1 kg (136 lb 14.5 oz)   BMI 24.25 kg/m²      Change in medical, surgical, family or social history: No      REVIEWED MEDICATION LIST RECONCILED INCLUDING ABOVE MEDS:  Yes     "

## 2024-04-04 ENCOUNTER — TELEPHONE (OUTPATIENT)
Dept: ENDOSCOPY | Facility: HOSPITAL | Age: 49
End: 2024-04-04
Payer: COMMERCIAL

## 2024-04-04 DIAGNOSIS — K59.04 CHRONIC IDIOPATHIC CONSTIPATION: ICD-10-CM

## 2024-04-04 DIAGNOSIS — Z12.11 ENCOUNTER FOR SCREENING COLONOSCOPY FOR NON-HIGH-RISK PATIENT: Primary | ICD-10-CM

## 2024-04-04 DIAGNOSIS — D50.9 IRON DEFICIENCY ANEMIA, UNSPECIFIED IRON DEFICIENCY ANEMIA TYPE: ICD-10-CM

## 2024-04-04 DIAGNOSIS — R19.4 CHANGE IN BOWEL HABIT: Primary | ICD-10-CM

## 2024-04-04 RX ORDER — POLYETHYLENE GLYCOL 3350, SODIUM SULFATE, POTASSIUM CHLORIDE, MAGNESIUM SULFATE, AND SODIUM CHLORIDE FOR ORAL SOLUTION 178.7-7.3G
1 KIT ORAL DAILY
Qty: 2 EACH | Refills: 0 | Status: SHIPPED | OUTPATIENT
Start: 2024-04-04 | End: 2024-04-06

## 2024-04-04 NOTE — TELEPHONE ENCOUNTER
----- Message from Martha Howe sent at 2024  8:16 AM CDT -----    ----- Message -----  From: Corey Howard MD  Sent: 4/3/2024   8:24 AM CDT  To: [facility]    Procedure: EGD/Colonoscopy/Anorectal Manometry    Diagnosis:  Change in bowel habits chronic idiopathic constipation Iron deficiency anemia, status post Almas-en-Y gastric bypass    Procedure Timin-6 weeks    Provider: Myself    Location: [address]    Additional Scheduling Information:  Constipation bowel prep protocol    Prep Specifications:Extended/Constipation prep    Is the patient taking a GLP-1 Agonist:no    Have you attached a patient to this message: yes

## 2024-04-04 NOTE — TELEPHONE ENCOUNTER
Spoke to pt to schedule procedure(s) Anorectal Manometry       Physician to perform procedure(s) Dr. CONTRERAS Stewart  Date of Procedure (s) 5/1/24  Arrival Time 10:30 AM  Time of Procedure(s) 1:00 PM   Location of Procedure(s) [facility] Floor  Type of Rx Prep sent to patient: Enema  Instructions provided to patient via [facility]    Patient was informed on the following information and verbalized understanding. Screening questionnaire reviewed with patient and complete. No ride arrangements are required for this procedure.   Appointment details are tentative, especially check-in time. Patient will receive a prep-op call 7 days prior to confirm check-in time for procedure. If applicable the patient should contact their pharmacy to verify Rx for procedure prep is ready for pick-up. Patient was advised to call the scheduling department at [phone] if pharmacy states no Rx is available. Patient was advised to call the endoscopy scheduling department if any questions or concerns arise.       Endoscopy Scheduling Department

## 2024-04-04 NOTE — TELEPHONE ENCOUNTER
Spoke to patient to schedule procedure(s) Colonoscopy/EGD       Physician to perform procedure(s) Dr. CARLY Howard  Date of Procedure (s) 6/28/24  Arrival Time 12:30 PM  Time of Procedure(s) 1:30 PM   Location of Procedure(s) [place] 4th Floor  Type of Rx Prep sent to patient: [facility]  Instructions provided to patient via [facility]    Patient was informed on the following information and verbalized understanding. Screening questionnaire reviewed with patient and complete. If procedure requires anesthesia, a responsible adult needs to be present to accompany the patient home, patient cannot drive after receiving anesthesia. Appointment details are tentative, especially check-in time. Patient will receive a prep-op call 7 days prior to confirm check-in time for procedure. If applicable the patient should contact their pharmacy to verify Rx for procedure prep is ready for pick-up. Patient was advised to call the scheduling department at [phone] if pharmacy states no Rx is available. Patient was advised to call the endoscopy scheduling department if any questions or concerns arise.      [facility] Scheduling Department

## 2024-04-05 LAB — TTG IGA SER-ACNC: 0.2 U/ML

## 2024-04-09 ENCOUNTER — PATIENT MESSAGE (OUTPATIENT)
Dept: BARIATRICS | Facility: CLINIC | Age: 49
End: 2024-04-09
Payer: COMMERCIAL

## 2024-04-25 ENCOUNTER — TELEPHONE (OUTPATIENT)
Dept: ENDOSCOPY | Facility: HOSPITAL | Age: 49
End: 2024-04-25
Payer: COMMERCIAL

## 2024-04-25 ENCOUNTER — HOSPITAL ENCOUNTER (OUTPATIENT)
Dept: RADIOLOGY | Facility: HOSPITAL | Age: 49
Discharge: HOME | End: 2024-04-25
Attending: INTERNAL MEDICINE
Payer: COMMERCIAL

## 2024-04-25 DIAGNOSIS — K59.09 CHRONIC CONSTIPATION: ICD-10-CM

## 2024-04-25 PROCEDURE — 72195 MRI PELVIS W/O DYE: CPT | Mod: TC

## 2024-04-25 PROCEDURE — 72195 MRI PELVIS W/O DYE: CPT | Mod: 26,,, | Performed by: RADIOLOGY

## 2024-04-25 NOTE — TELEPHONE ENCOUNTER
Contacted Pt for endoscopy pre-call for upcoming procedure.  Pre-call complete, Pt does not have any further questions. Pt knows she does not need a ride for this procedure. Arrival time:10:45am

## 2024-04-27 ENCOUNTER — PATIENT MESSAGE (OUTPATIENT)
Dept: GASTROENTEROLOGY | Facility: CLINIC | Age: 49
End: 2024-04-27
Payer: COMMERCIAL

## 2024-04-27 DIAGNOSIS — N81.10 VAGINAL PROLAPSE: ICD-10-CM

## 2024-04-27 DIAGNOSIS — N81.6 RECTOCELE: Primary | ICD-10-CM

## 2024-04-27 DIAGNOSIS — K59.04 CHRONIC IDIOPATHIC CONSTIPATION: ICD-10-CM

## 2024-04-27 DIAGNOSIS — N81.10 CYSTOCELE WITH RECTOCELE: ICD-10-CM

## 2024-04-27 DIAGNOSIS — N81.6 CYSTOCELE WITH RECTOCELE: ICD-10-CM

## 2024-04-27 NOTE — PROGRESS NOTES
Maci please refer Mary to Dr. Savage in your gynecology for evaluation of her chronic constipation and her abnormal MRI defecography below referral placed.  Thank you    Mary your MRI defecography was read as follows with the above recommendations    Impression:    1.    Anatomic findings: Post hysterectomy surgical changes    2.    Anterior compartment findings: 2.5 cm grade 1 cystocele    3.    Middle compartment findings: Vaginal prolapse    4.    Posterior compartment findings: 4.5 cm grade 2 rectocele    Electronically signed by:Terrence Mayes MD  Date:                                            04/26/2024

## 2024-04-28 ENCOUNTER — PATIENT MESSAGE (OUTPATIENT)
Dept: GASTROENTEROLOGY | Facility: CLINIC | Age: 49
End: 2024-04-28
Payer: COMMERCIAL

## 2024-04-28 DIAGNOSIS — D50.9 IRON DEFICIENCY ANEMIA, UNSPECIFIED IRON DEFICIENCY ANEMIA TYPE: Primary | ICD-10-CM

## 2024-04-28 RX ORDER — FERROUS GLUCONATE 324(38)MG
324 TABLET ORAL
Qty: 90 TABLET | Refills: 1 | Status: SHIPPED | OUTPATIENT
Start: 2024-04-28 | End: 2024-10-25

## 2024-04-28 NOTE — PROGRESS NOTES
GI MA team - please tell patient that they are possibly iron deficient and anemic and recommend that they take ferrous gluconate one 324mg pill once daily for next 3 months.    GI MA team -  Please order repeat fasting Hemoglobin, Iron/TIBC, and Ferritin in 8 weeks - Orders placed.

## 2024-04-29 ENCOUNTER — TELEPHONE (OUTPATIENT)
Dept: GASTROENTEROLOGY | Facility: CLINIC | Age: 49
End: 2024-04-29
Payer: COMMERCIAL

## 2024-04-29 NOTE — TELEPHONE ENCOUNTER
----- Message from Adam Perera sent at 4/29/2024 12:53 PM CDT -----  Regarding: Pt Advice  Contact: ADA MIDDLETON [[id number]]  Name of Who is Calling: ADA MIDDLETON [[id number]]      What is the request in detail: Would like to speak with staff in regards to reasons for her to see a Urogyn provider.       Can the clinic reply by [facility]: no      What Number to Call Back [facility] not [facility]:  [phone]

## 2024-04-30 ENCOUNTER — TELEPHONE (OUTPATIENT)
Dept: GASTROENTEROLOGY | Facility: CLINIC | Age: 49
End: 2024-04-30
Payer: COMMERCIAL

## 2024-04-30 NOTE — TELEPHONE ENCOUNTER
----- Message from Fernie Morgan sent at 4/30/2024  4:19 PM CDT -----  Regarding: Arrival time needed  Contact: [phone]  Hi, pt has a procedure tomorrow, but need the arrival time. Pls call the pt at  [phone].  Thank you.

## 2024-05-01 ENCOUNTER — HOSPITAL ENCOUNTER (OUTPATIENT)
Facility: HOSPITAL | Age: 49
Discharge: HOME | End: 2024-05-01
Attending: STUDENT IN AN ORGANIZED HEALTH CARE EDUCATION/TRAINING PROGRAM | Admitting: STUDENT IN AN ORGANIZED HEALTH CARE EDUCATION/TRAINING PROGRAM
Payer: COMMERCIAL

## 2024-05-01 VITALS
OXYGEN SATURATION: 99 % | BODY MASS INDEX: 23.92 KG/M2 | SYSTOLIC BLOOD PRESSURE: 138 MMHG | WEIGHT: 135 LBS | HEART RATE: 73 BPM | DIASTOLIC BLOOD PRESSURE: 69 MMHG | TEMPERATURE: 98 F | RESPIRATION RATE: 16 BRPM | HEIGHT: 63 IN

## 2024-05-01 DIAGNOSIS — K59.00 CONSTIPATION: ICD-10-CM

## 2024-05-01 PROCEDURE — 91120 HC RECTAL SENSATION TEST, BALLOON: CPT | Mod: TC | Performed by: STUDENT IN AN ORGANIZED HEALTH CARE EDUCATION/TRAINING PROGRAM

## 2024-05-01 PROCEDURE — 91122 HC ANORECTAL MANOMETRY: CPT | Mod: TC | Performed by: STUDENT IN AN ORGANIZED HEALTH CARE EDUCATION/TRAINING PROGRAM

## 2024-05-01 RX ORDER — ALBUTEROL SULFATE 5 MG/ML
2.5 SOLUTION RESPIRATORY (INHALATION) EVERY 6 HOURS PRN
COMMUNITY

## 2024-05-01 RX ORDER — SODIUM CHLORIDE 9 MG/ML
INJECTION, SOLUTION INTRAVENOUS CONTINUOUS
Status: DISCONTINUED | OUTPATIENT
Start: 2024-05-01 | End: 2024-05-01 | Stop reason: HOSPADM

## 2024-05-01 RX ORDER — ALBUTEROL SULFATE 1.25 MG/3ML
1.25 SOLUTION RESPIRATORY (INHALATION) EVERY 6 HOURS PRN
COMMUNITY

## 2024-05-06 PROCEDURE — 91120 PR RECTAL SENSATION TEST, BALLOON: CPT | Mod: 26,,, | Performed by: STUDENT IN AN ORGANIZED HEALTH CARE EDUCATION/TRAINING PROGRAM

## 2024-05-06 PROCEDURE — 91122 PR ANAL PRESSURE RECORD: CPT | Mod: 26,,, | Performed by: STUDENT IN AN ORGANIZED HEALTH CARE EDUCATION/TRAINING PROGRAM

## 2024-05-06 NOTE — PROVATION PATIENT INSTRUCTIONS
Discharge Summary/Instructions after an Endoscopic Procedure  Patient Name: Leora Steward  Patient MRN: [id number]  Patient YOB: 1975  Wednesday, May 1, 2024  Austin Stewart MD  Dear patient,  As a result of recent federal legislation (The Federal Cures Act), you may   receive lab or pathology results from your procedure in your [facility]   account before your physician is able to contact you. Your physician or   their representative will relay the results to you with their   recommendations at their soonest availability.  Thank you,  RESTRICTIONS:  During your procedure today, you received medications for sedation.  These   medications may affect your judgment, balance and coordination.  Therefore,   for 24 hours, you have the following restrictions:   - DO NOT drive a car, operate machinery, make legal/financial decisions,   sign important papers or drink alcohol.    ACTIVITY:  Today: no heavy lifting, straining or running due to procedural   sedation/anesthesia.  The following day: return to full activity including work.  DIET:  Eat and drink normally unless instructed otherwise.     TREATMENT FOR COMMON SIDE EFFECTS:  - Mild abdominal pain, nausea, belching, bloating or excessive gas:  rest,   eat lightly and use a heating pad.  - Sore Throat: treat with throat lozenges and/or gargle with warm salt   water.  - Because air was used during the procedure, expelling large amounts of air   from your rectum or belching is normal.  - If a bowel prep was taken, you may not have a bowel movement for 1-3 days.    This is normal.  SYMPTOMS TO WATCH FOR AND REPORT TO YOUR PHYSICIAN:  1. Abdominal pain or bloating, other than gas cramps.  2. Chest pain.  3. Back pain.  4. Signs of infection such as: chills or fever occurring within 24 hours   after the procedure.  5. Rectal bleeding, which would show as bright red, maroon, or black stools.   (A tablespoon of blood from the rectum is not serious, especially  if   hemorrhoids are present.)  6. Vomiting.  7. Weakness or dizziness.  GO DIRECTLY TO THE NEAREST EMERGENCY ROOM IF YOU HAVE ANY OF THE FOLLOWING:      Difficulty breathing              Chills and/or fever over 101 F   Persistent vomiting and/or vomiting blood   Severe abdominal pain   Severe chest pain   Black, tarry stools   Bleeding- more than one tablespoon   Any other symptom or condition that you feel may need urgent attention  Your doctor recommends these additional instructions:  If any biopsies were taken, your doctors clinic will contact you in 1 to 2   weeks with any results.  - Trial of pelvic floor physical therapy  For questions, problems or results please call your physician - Austin Stewart MD at Work:  [phone].  [facility] [place] [facility], EMERGENCY ROOM PHONE NUMBER: [phone]  IF A COMPLICATION OR EMERGENCY SITUATION ARISES AND YOU ARE UNABLE TO REACH   YOUR PHYSICIAN - GO DIRECTLY TO THE EMERGENCY ROOM.  Austin Stewart MD  5/6/2024 12:49:46 PM  This report has been verified and signed electronically.  Dear patient,  As a result of recent federal legislation (The Federal Cures Act), you may   receive lab or pathology results from your procedure in your [facility]   account before your physician is able to contact you. Your physician or   their representative will relay the results to you with their   recommendations at their soonest availability.  Thank you,  PROVATION

## 2024-05-10 ENCOUNTER — TELEPHONE (OUTPATIENT)
Dept: SURGERY | Facility: CLINIC | Age: 49
End: 2024-05-10
Payer: COMMERCIAL

## 2024-05-10 DIAGNOSIS — N81.6 CYSTOCELE WITH RECTOCELE: ICD-10-CM

## 2024-05-10 DIAGNOSIS — K59.09 CONSTIPATION, CHRONIC: Primary | ICD-10-CM

## 2024-05-10 DIAGNOSIS — N81.10 CYSTOCELE WITH RECTOCELE: ICD-10-CM

## 2024-05-14 ENCOUNTER — PATIENT MESSAGE (OUTPATIENT)
Dept: BARIATRICS | Facility: CLINIC | Age: 49
End: 2024-05-14
Payer: COMMERCIAL

## 2024-05-20 ENCOUNTER — PATIENT MESSAGE (OUTPATIENT)
Dept: SURGERY | Facility: CLINIC | Age: 49
End: 2024-05-20
Payer: COMMERCIAL

## 2024-06-11 ENCOUNTER — PATIENT MESSAGE (OUTPATIENT)
Dept: BARIATRICS | Facility: CLINIC | Age: 49
End: 2024-06-11
Payer: COMMERCIAL

## 2024-06-28 ENCOUNTER — ANESTHESIA EVENT (OUTPATIENT)
Dept: ENDOSCOPY | Facility: HOSPITAL | Age: 49
End: 2024-06-28
Payer: COMMERCIAL

## 2024-06-28 ENCOUNTER — HOSPITAL ENCOUNTER (OUTPATIENT)
Facility: HOSPITAL | Age: 49
Discharge: HOME | End: 2024-06-28
Attending: INTERNAL MEDICINE | Admitting: INTERNAL MEDICINE
Payer: COMMERCIAL

## 2024-06-28 ENCOUNTER — ANESTHESIA (OUTPATIENT)
Dept: ENDOSCOPY | Facility: HOSPITAL | Age: 49
End: 2024-06-28
Payer: COMMERCIAL

## 2024-06-28 VITALS
HEART RATE: 62 BPM | TEMPERATURE: 98 F | BODY MASS INDEX: 23.92 KG/M2 | HEIGHT: 63 IN | OXYGEN SATURATION: 98 % | SYSTOLIC BLOOD PRESSURE: 124 MMHG | RESPIRATION RATE: 16 BRPM | DIASTOLIC BLOOD PRESSURE: 66 MMHG | WEIGHT: 135 LBS

## 2024-06-28 DIAGNOSIS — D50.9 IRON DEFICIENCY ANEMIA: ICD-10-CM

## 2024-06-28 PROCEDURE — 45385 COLONOSCOPY W/LESION REMOVAL: CPT | Performed by: INTERNAL MEDICINE

## 2024-06-28 PROCEDURE — 88305 TISSUE EXAM BY PATHOLOGIST: CPT | Performed by: PATHOLOGY

## 2024-06-28 PROCEDURE — 43239 EGD BIOPSY SINGLE/MULTIPLE: CPT | Performed by: INTERNAL MEDICINE

## 2024-06-28 PROCEDURE — 45385 COLONOSCOPY W/LESION REMOVAL: CPT | Mod: 33,,, | Performed by: INTERNAL MEDICINE

## 2024-06-28 PROCEDURE — 43239 EGD BIOPSY SINGLE/MULTIPLE: CPT | Mod: 51,,, | Performed by: INTERNAL MEDICINE

## 2024-06-28 PROCEDURE — 25000003 PHARM REV CODE 250: Performed by: NURSE ANESTHETIST, CERTIFIED REGISTERED

## 2024-06-28 PROCEDURE — 37000009 HC ANESTHESIA EA ADD 15 MINS: Performed by: INTERNAL MEDICINE

## 2024-06-28 PROCEDURE — 63600175 PHARM REV CODE 636 W HCPCS: Performed by: NURSE ANESTHETIST, CERTIFIED REGISTERED

## 2024-06-28 PROCEDURE — 27201012 HC FORCEPS, HOT/COLD, DISP: Performed by: INTERNAL MEDICINE

## 2024-06-28 PROCEDURE — 37000008 HC ANESTHESIA 1ST 15 MINUTES: Performed by: INTERNAL MEDICINE

## 2024-06-28 PROCEDURE — 27201089 HC SNARE, DISP (ANY): Performed by: INTERNAL MEDICINE

## 2024-06-28 RX ORDER — LIDOCAINE HYDROCHLORIDE 20 MG/ML
INJECTION INTRAVENOUS
Status: DISCONTINUED | OUTPATIENT
Start: 2024-06-28 | End: 2024-06-28

## 2024-06-28 RX ORDER — PROPOFOL 10 MG/ML
VIAL (ML) INTRAVENOUS
Status: DISCONTINUED | OUTPATIENT
Start: 2024-06-28 | End: 2024-06-28

## 2024-06-28 RX ORDER — PROPOFOL 10 MG/ML
VIAL (ML) INTRAVENOUS CONTINUOUS PRN
Status: DISCONTINUED | OUTPATIENT
Start: 2024-06-28 | End: 2024-06-28

## 2024-06-28 RX ORDER — SODIUM CHLORIDE 9 MG/ML
INJECTION, SOLUTION INTRAVENOUS CONTINUOUS
Status: DISCONTINUED | OUTPATIENT
Start: 2024-06-28 | End: 2024-06-28 | Stop reason: HOSPADM

## 2024-06-28 RX ADMIN — PROPOFOL 175 MCG/KG/MIN: 10 INJECTION, EMULSION INTRAVENOUS at 01:06

## 2024-06-28 RX ADMIN — PROPOFOL 60 MG: 10 INJECTION, EMULSION INTRAVENOUS at 01:06

## 2024-06-28 RX ADMIN — SODIUM CHLORIDE: 9 INJECTION, SOLUTION INTRAVENOUS at 01:06

## 2024-06-28 RX ADMIN — LIDOCAINE HYDROCHLORIDE 60 MG: 20 INJECTION INTRAVENOUS at 01:06

## 2024-06-28 NOTE — TRANSFER OF CARE
"Anesthesia Transfer of Care Note    Patient: Leora Steward    Procedure(s) Performed: Procedure(s) (LRB):  EGD (ESOPHAGOGASTRODUODENOSCOPY) (N/A)  COLONOSCOPY (N/A)    Patient location: PACU    Anesthesia Type: general    Transport from OR: Transported from OR on room air with adequate spontaneous ventilation    Post pain: adequate analgesia    Post assessment: no apparent anesthetic complications and tolerated procedure well    Post vital signs: stable    Level of consciousness: awake, alert and oriented    Nausea/Vomiting: no nausea/vomiting    Complications: none    Transfer of care protocol was followed      Last vitals: Visit Vitals  /64 (BP Location: Left arm, Patient Position: Lying)   Pulse 62   Temp 36.6 °C (97.9 °F) (Temporal)   Resp 15   Ht 5' 3" (1.6 m)   Wt 61.2 kg (135 lb)   SpO2 100%   Breastfeeding No   BMI 23.91 kg/m²     "

## 2024-06-28 NOTE — H&P
[facility] 4th Floor  History & Physical    Subjective:      Chief Complaint/Reason for Admission:          Procedure: EGD/Colonoscopy     Diagnosis:  Change in bowel habits chronic idiopathic constipation Iron deficiency anemia, status post Almas-en-Y gastric bypass                 Leora Steward is a 48 y.o. female.    Past Medical History:   Diagnosis Date    Anxiety     Arthritis     COPD (chronic obstructive pulmonary disease)     Depression     Diabetes mellitus     Hypertension     Neuromuscular disorder      Past Surgical History:   Procedure Laterality Date    ANORECTAL MANOMETRY N/A 2024    Procedure: MANOMETRY, ANORECTAL;  Surgeon: Austin Stewart MD;  Location: [facility] ([address] [place] [address]);  Service: Endoscopy;  Laterality: N/A;  Ref By: ,instr sent via portal,traveling enema at check in.  -instructions [facility] to [facility], precall complete, pt knows no ride needed-Kpvt    CHOLECYSTECTOMY      HEMORRHOID SURGERY      HYSTERECTOMY      full    LAPAROSCOPIC GASTROENTEROSTOMY N/A 10/16/2019    Procedure: GASTROENTEROSTOMY, LAPAROSCOPIC, with intraop EGD;  Surgeon: Edmundo Ulrich MD;  Location: [facility] OR [address] [place] [address];  Service: General;  Laterality: N/A;    PANNICULECTOMY N/A 2023    Procedure: PANNICULECTOMY;  Surgeon: Cabrera Bardales MD;  Location: [facility] OR [address] [place] [address];  Service: Plastics;  Laterality: N/A;    TUBAL LIGATION       Social History     Tobacco Use    Smoking status: Former     Current packs/day: 0.00     Types: Cigarettes     Quit date:      Years since quittin.4     Passive exposure: Never    Smokeless tobacco: Never   Substance Use Topics    Alcohol use: Not Currently    Drug use: Never       PTA Medications   Medication Sig    b complex vitamins tablet Take 1 tablet by mouth once daily. Vitamin B 12 1200 mcg sublingual   No B1    buPROPion (WELLBUTRIN XL) 150 MG TB24 tablet Take 150 mg by mouth once daily.    ferrous gluconate (FERGON) 324 MG  tablet Take 1 tablet (324 mg total) by mouth daily with breakfast.    sertraline (ZOLOFT) 50 MG tablet Take 1 tablet by mouth once daily.    thiamine (VITAMIN B-1) 50 MG tablet Take 50 mg by mouth once daily.    traZODone (DESYREL) 50 MG tablet Take 50 mg by mouth every evening.    VYVANSE 70 mg capsule Take 70 mg by mouth.    acetaminophen (TYLENOL 8 HOUR ORAL) Take by mouth as needed.    albuterol (ACCUNEB) 1.25 mg/3 mL Nebu Take 1.25 mg by nebulization every 6 (six) hours as needed. Rescue    albuterol (PROVENTIL) 5 mg/mL nebulizer solution Take 2.5 mg by nebulization every 6 (six) hours as needed for Wheezing. Rescue    CALCIUM CITRATE ORAL Take 1 tablet by mouth 3 (three) times daily.    ibuprofen (ADVIL,MOTRIN) 200 MG tablet Take 200 mg by mouth every 6 (six) hours as needed for Pain.    montelukast (SINGULAIR) 10 mg tablet     multivitamin (THERAGRAN) per tablet Take 1 tablet by mouth 2 (two) times daily.    UNABLE TO FIND once daily. [facility]     Review of patient's allergies indicates:   Allergen Reactions    Sulfamethoxazole-trimethoprim         Review of Systems   Constitutional:  Negative for fever.       Objective:      Vital Signs (Most Recent)  Temp: 97.9 °F (36.6 °C) (06/28/24 1229)  Pulse: 62 (06/28/24 1229)  Resp: 15 (06/28/24 1229)  BP: 112/61 (06/28/24 1229)  SpO2: 100 % (06/28/24 1229)    Vital Signs Range (Last 24H):  Temp:  [97.9 °F (36.6 °C)]   Pulse:  [62]   Resp:  [15]   BP: (112)/(61)   SpO2:  [100 %]     Physical Exam  Cardiovascular:      Rate and Rhythm: Normal rate.   Pulmonary:      Effort: Pulmonary effort is normal.   Neurological:      Mental Status: She is alert and oriented to person, place, and time.             Assessment:      Procedure: EGD/Colonoscopy     Diagnosis:  Change in bowel habits chronic idiopathic constipation Iron deficiency anemia, status post Almas-en-Y gastric bypass      Plan:    Procedure: EGD/Colonoscopy     Diagnosis:  Change in bowel habits  chronic idiopathic constipation Iron deficiency anemia, status post Almas-en-Y gastric bypass

## 2024-06-28 NOTE — PROVATION PATIENT INSTRUCTIONS
Discharge Summary/Instructions after an Endoscopic Procedure  Patient Name: Leora Steward  Patient MRN: [id number]  Patient YOB: 1975  Friday, June 28, 2024  Corey Howard MD  Dear patient,  As a result of recent federal legislation (The Federal Cures Act), you may   receive lab or pathology results from your procedure in your [facility]   account before your physician is able to contact you. Your physician or   their representative will relay the results to you with their   recommendations at their soonest availability.  Thank you,  RESTRICTIONS:  During your procedure today, you received medications for sedation.  These   medications may affect your judgment, balance and coordination.  Therefore,   for 24 hours, you have the following restrictions:   - DO NOT drive a car, operate machinery, make legal/financial decisions,   sign important papers or drink alcohol.    ACTIVITY:  Today: no heavy lifting, straining or running due to procedural   sedation/anesthesia.  The following day: return to full activity including work.  DIET:  Eat and drink normally unless instructed otherwise.     TREATMENT FOR COMMON SIDE EFFECTS:  - Mild abdominal pain, nausea, belching, bloating or excessive gas:  rest,   eat lightly and use a heating pad.  - Sore Throat: treat with throat lozenges and/or gargle with warm salt   water.  - Because air was used during the procedure, expelling large amounts of air   from your rectum or belching is normal.  - If a bowel prep was taken, you may not have a bowel movement for 1-3 days.    This is normal.  SYMPTOMS TO WATCH FOR AND REPORT TO YOUR PHYSICIAN:  1. Abdominal pain or bloating, other than gas cramps.  2. Chest pain.  3. Back pain.  4. Signs of infection such as: chills or fever occurring within 24 hours   after the procedure.  5. Rectal bleeding, which would show as bright red, maroon, or black stools.   (A tablespoon of blood from the rectum is not serious, especially  if   hemorrhoids are present.)  6. Vomiting.  7. Weakness or dizziness.  GO DIRECTLY TO THE NEAREST EMERGENCY ROOM IF YOU HAVE ANY OF THE FOLLOWING:      Difficulty breathing              Chills and/or fever over 101 F   Persistent vomiting and/or vomiting blood   Severe abdominal pain   Severe chest pain   Black, tarry stools   Bleeding- more than one tablespoon   Any other symptom or condition that you feel may need urgent attention  Your doctor recommends these additional instructions:  If any biopsies were taken, your doctors clinic will contact you in 1 to 2   weeks with any results.  - Discharge patient to home.   - Await pathology results.   - Telephone endoscopist for pathology results in 3 weeks.   - Repeat colonoscopy at the next available appointment because the bowel   preparation was poor.   - Return to GI clinic at the next available appointment.   - The findings and recommendations were discussed with the patient.  For questions, problems or results please call your physician - Corey Howard MD at Work:  [phone].  [facility] [place] [facility], EMERGENCY ROOM PHONE NUMBER: [phone]  IF A COMPLICATION OR EMERGENCY SITUATION ARISES AND YOU ARE UNABLE TO REACH   YOUR PHYSICIAN - GO DIRECTLY TO THE EMERGENCY ROOM.  Corey Howard MD  6/28/2024 2:46:05 PM  This report has been verified and signed electronically.  Dear patient,  As a result of recent federal legislation (The Federal Cures Act), you may   receive lab or pathology results from your procedure in your [facility]   account before your physician is able to contact you. Your physician or   their representative will relay the results to you with their   recommendations at their soonest availability.  Thank you,  PROVATION

## 2024-06-28 NOTE — ANESTHESIA PREPROCEDURE EVALUATION
06/28/2024  [facility]  Anesthesia Pre-Operative Evaluation     Patient Name: Leora Setward  YOB: 1975  MRN: [id number]  [facility]: [id number]       Admit Date: 6/28/2024   Admit Team: Networked reference to record PCT   Hospital Day: 1  Date of Procedure: 6/28/2024  Anesthesia: Choice Procedure: Procedure(s) (LRB):  EGD (ESOPHAGOGASTRODUODENOSCOPY) (N/A)  COLONOSCOPY (N/A)  Pre-Operative Diagnosis: Change in bowel habit [R19.4]  Chronic idiopathic constipation [K59.04]  Iron deficiency anemia, unspecified iron deficiency anemia type [D50.9]  Proceduralist:Surgeons and Role:     * Corey Howard MD - Primary  Code Status: No Order   Advanced Directive: <no information>  Isolation Precautions: No active isolations  Capacity: Full capacity     SUBJECTIVE:   Leora Steward is a 48 y.o. female who  has a past medical history of Anxiety, Arthritis, COPD (chronic obstructive pulmonary disease), Depression, Diabetes mellitus, Hypertension, and Neuromuscular disorder.  No notes on file      Hospital LOS: 0 days  ICU LOS: Patient does not have an ICU stay during this admission.    she has a current medication list which includes the following long-term medication(s): albuterol, albuterol, bupropion, sertraline, trazodone, and vyvanse.     ALLERGIES:     Review of patient's allergies indicates:   Allergen Reactions    Sulfamethoxazole-trimethoprim      LDA:   AIRWAY:         [unfilled]     Lines/Drains/Airways       Drain  Duration                  Closed/Suction Drain 03/07/23 1303 Left Hip Bulb 15 Fr. 478 days         Closed/Suction Drain 03/07/23 1303 Right Hip Bulb 15 Fr. 478 days                   Anesthesia Evaluation      Airway   Mallampati: III  TM distance: Normal  Neck ROM: Normal ROM  Dental      Pulmonary    (+) COPD  Cardiovascular   (+) hypertension    Neuro/Psych     (+) neuromuscular disease, psychiatric history    GI/Hepatic/Renal      Endo/Other    (+) diabetes mellitus  Abdominal                    MEDICATIONS:     Current Outpatient Medications on File Prior to Encounter   Medication Sig Dispense Refill Last Dose    acetaminophen (TYLENOL 8 HOUR ORAL) Take by mouth as needed.       b complex vitamins tablet Take 1 tablet by mouth once daily. Vitamin B 12 1200 mcg sublingual   No B1       buPROPion (WELLBUTRIN XL) 150 MG TB24 tablet Take 150 mg by mouth once daily.       CALCIUM CITRATE ORAL Take 1 tablet by mouth 3 (three) times daily.       ibuprofen (ADVIL,MOTRIN) 200 MG tablet Take 200 mg by mouth every 6 (six) hours as needed for Pain.       montelukast (SINGULAIR) 10 mg tablet        multivitamin (THERAGRAN) per tablet Take 1 tablet by mouth 2 (two) times daily.       sertraline (ZOLOFT) 50 MG tablet Take 1 tablet by mouth once daily.       thiamine (VITAMIN B-1) 50 MG tablet Take 50 mg by mouth once daily.       traZODone (DESYREL) 50 MG tablet Take 50 mg by mouth every evening.       UNABLE TO FIND once daily. [place]       VYVANSE 70 mg capsule Take 70 mg by mouth.         Inpatient Medications:  Antibiotics (From admission, onward)      None          VTE Risk Mitigation (From admission, onward)      None              No current facility-administered medications for this encounter.          History:   There are no hospital problems to display for this patient.    Surgical History:    has a past surgical history that includes Cholecystectomy; Tubal ligation; Hysterectomy; Laparoscopic gastroenterostomy (N/A, 10/16/2019); Panniculectomy (N/A, 03/07/2023); Hemorrhoid surgery; and Anorectal manometry (N/A, 5/1/2024).   Social History:    reports being sexually active and has had partner(s) who are male. She reports using the following method of birth control/protection: See Surgical Hx.  reports that she quit smoking about 11 years ago. Her smoking use  "included cigarettes. She has never been exposed to tobacco smoke. She has never used smokeless tobacco. She reports that she does not currently use alcohol. She reports that she does not use drugs.    There were no vitals filed for this visit.  Vital Signs Range (Last 24H):       There is no height or weight on file to calculate BMI.  Wt Readings from Last 4 Encounters:   05/01/24 61.2 kg (135 lb)   04/03/24 62.1 kg (136 lb 14.5 oz)   03/26/24 59.9 kg (132 lb 0.9 oz)   04/26/23 67.6 kg (149 lb)        Intake/Output - Last 3 Shifts       None          Lab Results   Component Value Date    WBC 4.36 03/26/2024    HGB 11.9 (L) 03/26/2024    HCT 37.0 03/26/2024     03/26/2024     03/26/2024    K 4.3 03/26/2024     03/26/2024    CREATININE 0.7 03/26/2024    BUN 15 03/26/2024    CO2 27 03/26/2024    GLU 77 03/26/2024    CALCIUM 9.0 03/26/2024    MG 1.7 10/17/2019    PHOS 2.2 (L) 10/17/2019    ALKPHOS 84 03/26/2024    ALT 15 03/26/2024    AST 19 03/26/2024    ALBUMIN 3.5 03/26/2024    HGBA1C 5.5 02/20/2023     No results found for this or any previous visit (from the past 12 hour(s)).  No results for input(s): "WBC", "HGB", "HCT", "PLT", "NA", "K", "CREATININE", "GLU", "INR" in the last 168 hours.  No LMP recorded. Patient has had a hysterectomy.    EKG:   Results for orders placed or performed during the hospital encounter of 02/20/23   EKG 12-lead    Collection Time: 02/20/23 12:06 PM    Narrative    Test Reason : Z01.818,    Vent. Rate : 057 BPM     Atrial Rate : 057 BPM     P-R Int : 150 ms          QRS Dur : 096 ms      QT Int : 392 ms       P-R-T Axes : 058 008 047 degrees     QTc Int : 381 ms    Sinus bradycardia  When compared with ECG of 19-JUN-2019 07:58,  T wave inversion no longer evident in Inferior leads  Nonspecific T wave abnormality no longer evident in Anterior-lateral leads  Confirmed by Vito Jewell MD ([id number]) on 2/20/2023 2:59:32 PM    Referred By: BIB ROMAN           Confirmed " "By:Vito Jewell MD     TTE:  No results found for this or any previous visit.  No results found for this or any previous visit.  REUBEN:  No results found for this or any previous visit.  Stress Test:  No results found for this or any previous visit.     LHC:  No results found for this or any previous visit.     PFT:  No results found for: "FEV1", "FVC", "XBG2PRE", "TLC", "DLCO"         Pre-op Assessment    I have reviewed the Patient Summary Reports.     I have reviewed the Nursing Notes. I have reviewed the NPO Status.   I have reviewed the Medications.     Review of Systems  Anesthesia Hx:               Denies Personal Hx of Anesthesia complications.                    Cardiovascular:     Hypertension                                        Pulmonary:   COPD                     Neurological:    Neuromuscular Disease,                                   Endocrine:  Diabetes           Psych:  Psychiatric History                  Physical Exam  General: Alert, Cooperative and Oriented    Airway:  Mallampati: III / II  Mouth Opening: Normal  TM Distance: Normal  Tongue: Normal  Neck ROM: Normal ROM        Anesthesia Plan  Type of Anesthesia, risks & benefits discussed:    Anesthesia Type: Gen Natural Airway, Gen ETT  Intra-op Monitoring Plan: Standard ASA Monitors  Post Op Pain Control Plan: multimodal analgesia  Induction:  IV  Airway Plan: Direct  Informed Consent: Informed consent signed with the Patient and all parties understand the risks and agree with anesthesia plan.  All questions answered.   ASA Score: 3  Day of Surgery Review of History & Physical: H&P Update referred to the surgeon/provider.    Ready For Surgery From Anesthesia Perspective.     .      "

## 2024-06-28 NOTE — PLAN OF CARE
Plan of care reviewed with patient. All questions answered. Patient verbalized understanding.     complains of pain/discomfort

## 2024-06-28 NOTE — PROVATION PATIENT INSTRUCTIONS
Discharge Summary/Instructions after an Endoscopic Procedure  Patient Name: Leora Steward  Patient MRN: [id number]  Patient YOB: 1975  Friday, June 28, 2024  Corey Howard MD  Dear patient,  As a result of recent federal legislation (The Federal Cures Act), you may   receive lab or pathology results from your procedure in your [facility]   account before your physician is able to contact you. Your physician or   their representative will relay the results to you with their   recommendations at their soonest availability.  Thank you,  RESTRICTIONS:  During your procedure today, you received medications for sedation.  These   medications may affect your judgment, balance and coordination.  Therefore,   for 24 hours, you have the following restrictions:   - DO NOT drive a car, operate machinery, make legal/financial decisions,   sign important papers or drink alcohol.    ACTIVITY:  Today: no heavy lifting, straining or running due to procedural   sedation/anesthesia.  The following day: return to full activity including work.  DIET:  Eat and drink normally unless instructed otherwise.     TREATMENT FOR COMMON SIDE EFFECTS:  - Mild abdominal pain, nausea, belching, bloating or excessive gas:  rest,   eat lightly and use a heating pad.  - Sore Throat: treat with throat lozenges and/or gargle with warm salt   water.  - Because air was used during the procedure, expelling large amounts of air   from your rectum or belching is normal.  - If a bowel prep was taken, you may not have a bowel movement for 1-3 days.    This is normal.  SYMPTOMS TO WATCH FOR AND REPORT TO YOUR PHYSICIAN:  1. Abdominal pain or bloating, other than gas cramps.  2. Chest pain.  3. Back pain.  4. Signs of infection such as: chills or fever occurring within 24 hours   after the procedure.  5. Rectal bleeding, which would show as bright red, maroon, or black stools.   (A tablespoon of blood from the rectum is not serious, especially  if   hemorrhoids are present.)  6. Vomiting.  7. Weakness or dizziness.  GO DIRECTLY TO THE NEAREST EMERGENCY ROOM IF YOU HAVE ANY OF THE FOLLOWING:      Difficulty breathing              Chills and/or fever over 101 F   Persistent vomiting and/or vomiting blood   Severe abdominal pain   Severe chest pain   Black, tarry stools   Bleeding- more than one tablespoon   Any other symptom or condition that you feel may need urgent attention  Your doctor recommends these additional instructions:  If any biopsies were taken, your doctors clinic will contact you in 1 to 2   weeks with any results.  - Discharge patient to home.   - Follow an antireflux regimen.   - Await pathology results.   - Telephone endoscopist for pathology results in 3 weeks.   - Return to GI clinic at the next available appointment.  For questions, problems or results please call your physician - Corey Howard MD at Work:  [phone].  [facility] [place] [facility], EMERGENCY ROOM PHONE NUMBER: [phone]  IF A COMPLICATION OR EMERGENCY SITUATION ARISES AND YOU ARE UNABLE TO REACH   YOUR PHYSICIAN - GO DIRECTLY TO THE EMERGENCY ROOM.  Corey Howard MD  6/28/2024 1:57:53 PM  This report has been verified and signed electronically.  Dear patient,  As a result of recent federal legislation (The Federal Cures Act), you may   receive lab or pathology results from your procedure in your [facility]   account before your physician is able to contact you. Your physician or   their representative will relay the results to you with their   recommendations at their soonest availability.  Thank you,  PROVATION

## 2024-06-30 NOTE — ANESTHESIA POSTPROCEDURE EVALUATION
Anesthesia Post Evaluation    Patient: Leora Steward    Procedure(s) Performed: Procedure(s) (LRB):  EGD (ESOPHAGOGASTRODUODENOSCOPY) (N/A)  COLONOSCOPY (N/A)    Final Anesthesia Type: general      Patient location during evaluation: GI PACU  Patient participation: Yes- Able to Participate  Level of consciousness: awake and alert  Post-procedure vital signs: reviewed and stable  Pain management: adequate  Airway patency: patent    PONV status at discharge: No PONV  Anesthetic complications: no      Cardiovascular status: hemodynamically stable  Respiratory status: unassisted  Hydration status: euvolemic  Follow-up not needed.              Vitals Value Taken Time   /66 06/28/24 1501   Temp 36.7 °C (98 °F) 06/28/24 1435   Pulse 62 06/28/24 1501   Resp 16 06/28/24 1501   SpO2 98 % 06/28/24 1501         No case tracking events are documented in the log.      Pain/Alin Score: No data recorded

## 2024-07-02 LAB
FINAL PATHOLOGIC DIAGNOSIS: NORMAL
GROSS: NORMAL
Lab: NORMAL

## 2024-07-03 ENCOUNTER — PATIENT MESSAGE (OUTPATIENT)
Dept: BARIATRICS | Facility: CLINIC | Age: 49
End: 2024-07-03
Payer: COMMERCIAL

## 2024-07-07 ENCOUNTER — PATIENT MESSAGE (OUTPATIENT)
Dept: GASTROENTEROLOGY | Facility: CLINIC | Age: 49
End: 2024-07-07
Payer: COMMERCIAL

## 2024-07-09 ENCOUNTER — PATIENT MESSAGE (OUTPATIENT)
Dept: BARIATRICS | Facility: CLINIC | Age: 49
End: 2024-07-09
Payer: COMMERCIAL

## 2024-08-12 ENCOUNTER — PATIENT MESSAGE (OUTPATIENT)
Dept: BARIATRICS | Facility: CLINIC | Age: 49
End: 2024-08-12
Payer: COMMERCIAL

## 2024-09-03 ENCOUNTER — PATIENT MESSAGE (OUTPATIENT)
Dept: BARIATRICS | Facility: CLINIC | Age: 49
End: 2024-09-03
Payer: COMMERCIAL

## 2024-09-10 ENCOUNTER — PATIENT MESSAGE (OUTPATIENT)
Dept: BARIATRICS | Facility: CLINIC | Age: 49
End: 2024-09-10
Payer: COMMERCIAL

## 2024-09-16 ENCOUNTER — TELEPHONE (OUTPATIENT)
Dept: UROGYNECOLOGY | Facility: CLINIC | Age: 49
End: 2024-09-16

## 2024-09-16 NOTE — TELEPHONE ENCOUNTER
Called pt to find out more information ahead of appt with Dr. Savage on Thursday. Pt is referred for prolapse. Pt confirmed time & date of appt, call ended.

## 2024-09-19 ENCOUNTER — OFFICE VISIT (OUTPATIENT)
Dept: UROGYNECOLOGY | Facility: CLINIC | Age: 49
End: 2024-09-19
Payer: COMMERCIAL

## 2024-09-19 VITALS
BODY MASS INDEX: 25.2 KG/M2 | SYSTOLIC BLOOD PRESSURE: 115 MMHG | DIASTOLIC BLOOD PRESSURE: 62 MMHG | HEIGHT: 63 IN | WEIGHT: 142.19 LBS | HEART RATE: 81 BPM

## 2024-09-19 DIAGNOSIS — M79.18 MYALGIA OF PELVIC FLOOR: ICD-10-CM

## 2024-09-19 DIAGNOSIS — Z12.39 ENCOUNTER FOR SCREENING FOR MALIGNANT NEOPLASM OF BREAST, UNSPECIFIED SCREENING MODALITY: ICD-10-CM

## 2024-09-19 DIAGNOSIS — N39.46 MIXED STRESS AND URGE URINARY INCONTINENCE: ICD-10-CM

## 2024-09-19 DIAGNOSIS — N95.2 VAGINAL ATROPHY: ICD-10-CM

## 2024-09-19 DIAGNOSIS — N81.6 CYSTOCELE WITH RECTOCELE: ICD-10-CM

## 2024-09-19 DIAGNOSIS — N94.10 DYSPAREUNIA, FEMALE: ICD-10-CM

## 2024-09-19 DIAGNOSIS — N81.10 VAGINAL PROLAPSE: ICD-10-CM

## 2024-09-19 DIAGNOSIS — N81.10 CYSTOCELE WITH RECTOCELE: ICD-10-CM

## 2024-09-19 DIAGNOSIS — E89.40 SURGICAL MENOPAUSE: Primary | ICD-10-CM

## 2024-09-19 DIAGNOSIS — K59.04 CHRONIC IDIOPATHIC CONSTIPATION: ICD-10-CM

## 2024-09-19 PROCEDURE — 51701 INSERT BLADDER CATHETER: CPT | Mod: S$GLB,,, | Performed by: OBSTETRICS & GYNECOLOGY

## 2024-09-19 PROCEDURE — 99999 PR PBB SHADOW E&M-EST. PATIENT-LVL V: CPT | Mod: PBBFAC,,, | Performed by: OBSTETRICS & GYNECOLOGY

## 2024-09-19 PROCEDURE — 3008F BODY MASS INDEX DOCD: CPT | Mod: CPTII,S$GLB,, | Performed by: OBSTETRICS & GYNECOLOGY

## 2024-09-19 PROCEDURE — 99205 OFFICE O/P NEW HI 60 MIN: CPT | Mod: 25,S$GLB,, | Performed by: OBSTETRICS & GYNECOLOGY

## 2024-09-19 PROCEDURE — 3074F SYST BP LT 130 MM HG: CPT | Mod: CPTII,S$GLB,, | Performed by: OBSTETRICS & GYNECOLOGY

## 2024-09-19 PROCEDURE — 3078F DIAST BP <80 MM HG: CPT | Mod: CPTII,S$GLB,, | Performed by: OBSTETRICS & GYNECOLOGY

## 2024-09-19 PROCEDURE — 1159F MED LIST DOCD IN RCRD: CPT | Mod: CPTII,S$GLB,, | Performed by: OBSTETRICS & GYNECOLOGY

## 2024-09-19 PROCEDURE — 1160F RVW MEDS BY RX/DR IN RCRD: CPT | Mod: CPTII,S$GLB,, | Performed by: OBSTETRICS & GYNECOLOGY

## 2024-09-19 PROCEDURE — 87086 URINE CULTURE/COLONY COUNT: CPT | Performed by: OBSTETRICS & GYNECOLOGY

## 2024-09-19 RX ORDER — LUMATEPERONE 42 MG/1
1 CAPSULE ORAL
COMMUNITY
Start: 2024-08-15

## 2024-09-19 RX ORDER — ESTRADIOL 0.1 MG/G
CREAM VAGINAL
Qty: 42.5 G | Refills: 11 | Status: SHIPPED | OUTPATIENT
Start: 2024-09-19

## 2024-09-19 NOTE — PROGRESS NOTES
[facility] - UROGYNECOLOGY  [address] [address]    Leora Steward  [id number]  1975  September 27, 2024    Consulting Physician: Corey Howard MD   GYN: none  Primary M.D.: Camacho Augustine MD    Chief Complaint   Patient presents with    Vaginal Prolapse       HPI:     1)  UI:  (+) PANCHO < (+) UUI  X years. Sometimes PV dribbling.  (--) pads.  Changes underwear 1x/week.  Daytime frequency: Q 6+hours--can hold all day.  Nocturia: No.  (--) dysuria,  (--) hematuria,  (--) frequent UTIs.  (+) complete bladder emptying.     2)  POP:  Absent.  (--) vaginal bleeding. (--) vaginal discharge. (+) sexually active.  (+) dyspareunia. Deep penetration and after partner ejaculates--feels like stabbing pain--has to shower to calm down.  Has been happening x 1 year, same partner.  (+)  Vaginal dryness.  (--) vaginal estrogen use.     3)  BM:  (+) constipation/straining. Taking fiber daily, which has mostly controlled issue.  2 scoops QAM and 2 scoops QPM. Trying to drink enough water. Did have gastric bypass. Uses enema every 1-2 weeks.  (--) chronic diarrhea. (--) hematochezia.  (--) fecal incontinence.  (--) fecal smearing/urgency.  (+) complete evacuation.  Does disimpact rectum manually once every 1-2 weeks if stool is hard. No vaginal splinting.   4/2024 MR goodson:  Impression:  1.    Anatomic findings: Post hysterectomy surgical changes  2.    Anterior compartment findings: 2.5 cm grade 1 cystocele  3.    Middle compartment findings: Vaginal prolapse  4.    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  - RAIR present  - Hyposensitive rectum to first sensation and  urgency  - Elevated anal sphincter pressure at rest and  with squeeze  - Unable to expel rectal balloon  Recommendation: - Trial of pelvic floor physical therapy     Past Medical History  Past Medical History:    Diagnosis Date    Anxiety     Arthritis     COPD (chronic obstructive pulmonary disease)     Depression     Diabetes mellitus     Hypertension     Neuromuscular disorder    --COPD: s/p TOB use; currently no smoking/vaping since 2013; using inhalers PRN; no home O2  --DM:  had before gastric bypass--resolved now  Lab Results   Component Value Date    HGBA1C 5.5 02/20/2023   --depression: controlled currently    Past Surgical History  Past Surgical History:   Procedure Laterality Date    ANORECTAL MANOMETRY N/A 5/1/2024    Procedure: MANOMETRY, ANORECTAL;  Surgeon: Austin Stewart MD;  Location: [facility] (4TH FLR);  Service: Endoscopy;  Laterality: N/A;  Ref By: ,instr sent via portal,traveling enema at check in.  4/25-instructions resent to [facility], precall complete, pt knows no ride needed-Kpvt    CHOLECYSTECTOMY      COLONOSCOPY N/A 6/28/2024    Procedure: COLONOSCOPY;  Surgeon: Corey Howard MD;  Location: [facility] (4TH FLR);  Service: Endoscopy;  Laterality: N/A;  6/14/24- case length adjusted - ERW    ESOPHAGOGASTRODUODENOSCOPY N/A 6/28/2024    Procedure: EGD (ESOPHAGOGASTRODUODENOSCOPY);  Surgeon: Corey Howard MD;  Location: [facility] (4TH FLR);  Service: Endoscopy;  Laterality: N/A;  Ref By: ,instr sent via portal,[facility].  pre call complete 6/21, instructions reviewed -     HEMORRHOID SURGERY      HYSTERECTOMY      full    LAPAROSCOPIC GASTROENTEROSTOMY N/A 10/16/2019    Procedure: GASTROENTEROSTOMY, LAPAROSCOPIC, with intraop EGD;  Surgeon: Edmundo Ulrich MD;  Location: [facility] OR [address] [place] [address];  Service: General;  Laterality: N/A;    PANNICULECTOMY N/A 03/07/2023    Procedure: PANNICULECTOMY;  Surgeon: Cabrera Bardales MD;  Location: [facility] OR 2ND FLR;  Service: Plastics;  Laterality: N/A;    TUBAL LIGATION     --LSC gloria  --hemorrhoidectomy  --LSC BTL     Hysterectomy: Yes   Date: 30s.  Indication: endometriosis/painful menses.    Type:  laparoscopic  Cervix present: No  Ovaries present: No. Was on HRT--stopped years ago due to intolerance (emotional)  Other procedures at time of hysterectomy:  sounds like had cysto, ? IC--was put on medication in past    Past Ob History    TIUP: 1 live birth + 1 loss mid pregnancy; SIUP x 1   x 2.  C/s x 0.    Largest infant weight: 9#10z.  no FAVD. yes episiotomy.      Gynecologic History  LMP: No LMP recorded. Patient has had a hysterectomy.  Age of menarche: 8 yo  Age of menopause: with TLH  Menstrual history: h/o endometriosis  Pap test: post TLH.  History of abnormal paps: No.  History of STIs: yes-unknown-treated in 20s  Mammogram: overdue  Colonoscopy: Date of last: 2024.  Result:  polyp, suboptimal prep.  Repeat due:  .    DEXA:  none    Family History  Family History   Problem Relation Name Age of Onset    Cancer Mother          lung    Diabetes Father      Hypertension Father      Diabetes Sister      Thyroid disease Sister      No Known Problems Sister      No Known Problems Sister      Diabetes Brother      No Known Problems Daughter      No Known Problems Son      Anesthesia problems Neg Hx        Colon CA: No  Breast CA: No  GYN CA: No   CA: No    Social History  Social History     Tobacco Use   Smoking Status Former    Current packs/day: 0.00    Types: Cigarettes    Quit date:     Years since quittin.7    Passive exposure: Never   Smokeless Tobacco Never     Social History     Substance and Sexual Activity   Alcohol Use Not Currently   .    Social History     Substance and Sexual Activity   Drug Use Never     The patient is .  Resides in [place] [address].  Employment status:  just lost job; was an  .      Allergies  Review of patient's allergies indicates:   Allergen Reactions    Sulfamethoxazole-trimethoprim        Medications  Current Outpatient Medications on File Prior to Visit   Medication Sig Dispense Refill    albuterol (ACCUNEB) 1.25 mg/3 mL Nebu  Take 1.25 mg by nebulization every 6 (six) hours as needed. Rescue      albuterol (PROVENTIL) 5 mg/mL nebulizer solution Take 2.5 mg by nebulization every 6 (six) hours as needed for Wheezing. Rescue      b complex vitamins tablet Take 1 tablet by mouth once daily. Vitamin B 12 1200 mcg sublingual   No B1      buPROPion (WELLBUTRIN XL) 150 MG TB24 tablet Take 150 mg by mouth once daily.      CAPLYTA 42 mg Cap Take 1 capsule by mouth.      ferrous gluconate (FERGON) 324 MG tablet Take 1 tablet (324 mg total) by mouth daily with breakfast. 90 tablet 1    montelukast (SINGULAIR) 10 mg tablet       multivitamin (THERAGRAN) per tablet Take 1 tablet by mouth 2 (two) times daily.      sertraline (ZOLOFT) 50 MG tablet Take 1 tablet by mouth once daily.      thiamine (VITAMIN B-1) 50 MG tablet Take 50 mg by mouth once daily.      traZODone (DESYREL) 50 MG tablet Take 50 mg by mouth every evening.      VYVANSE 70 mg capsule Take 70 mg by mouth.      acetaminophen (TYLENOL 8 HOUR ORAL) Take by mouth as needed. (Patient not taking: Reported on 9/19/2024)      CALCIUM CITRATE ORAL Take 1 tablet by mouth 3 (three) times daily. (Patient not taking: Reported on 9/19/2024)      ibuprofen (ADVIL,MOTRIN) 200 MG tablet Take 200 mg by mouth every 6 (six) hours as needed for Pain. (Patient not taking: Reported on 9/19/2024)      UNABLE TO FIND once daily. [place] Iron (Patient not taking: Reported on 9/19/2024)       No current facility-administered medications on file prior to visit.       Review of Systems A 14 point ROS was reviewed with pertinent positives as noted above in the history of present illness.      Constitutional: negative  Eyes: negative  Endocrine: negative  Gastrointestinal: negative  Cardiovascular: negative  Respiratory: negative  Allergic/Immunologic: negative  Integumentary: negative  Psychiatric: negative  Musculoskeletal: negative   Ear/Nose/Throat: negative  Neurologic: negative  Genitourinary: SEE  "HPI  Hematologic/Lymphatic: negative   Breast: negative    Urogynecologic Exam  /62   Pulse 81   Ht 5' 3" (1.6 m)   Wt 64.5 kg (142 lb 3.2 oz)   BMI 25.19 kg/m²     GENERAL APPEARANCE:  The patient is well-developed, well-nourished.   Neck:  Supple with no thyromegaly, no carotid bruits.  Heart:  Regular rate and rhythm, no murmurs, rubs or gallops.  Lungs:  Clear.  No CVA tenderness.  Abdomen:  Soft, nontender, nondistended, no hepatosplenomegaly.  Incisions:  vert midline/abd plasty well-healed    PELVIC:    External genitalia:  Normal Bartholins, Skenes and labia bilaterally.    Urethra:  No caruncle, diverticulum or masses.  (+) hypermobility.    Vagina:  Atrophy (+) , no bladder masses or tender, no discharge.  +TTP B LV-feels like dyspareunia.   Cervix:  absent  Uterus: uterus absent  Adnexa: Not palpable.    POP-Q:  Aa -2; Ba -2; C -8; Ap -1; Bp -1.  Genital hiatus 4, perineal body 3. total vaginal length 9.    NEUROLOGIC:  Cranial nerves 2 through 12 intact.  Strength 5/5.  DTRs 2+ lower extremities.  S2 through 4 normal.  Sacral reflexes intact.    EXT: WEBB, 2+ pulses bilaterally, no C/C/E    COUGH STRESS TEST:  negative  KEGEL: 1 /5    RECTAL:    External:  Normal, (--) hemorrhoids, (--) dovetailing.   Internal:   (--) tenderness, (--) masses, Normal resting tone, Normal active tone. Very mild, distal pocketing.     PVR: 30 mL    Impression    1. Surgical menopause    2. Cystocele with rectocele    3. Vaginal prolapse    4. Chronic idiopathic constipation    5. Encounter for screening for malignant neoplasm of breast, unspecified screening modality    6. Mixed stress and urge urinary incontinence    7. Vaginal atrophy    8. Myalgia of pelvic floor    9. Dyspareunia, female        Initial Plan  The patient was counseled regarding these issues. The patient was given a summary sheet containing each of these issues with possible options for evaluation and management. When appropriate, we also " reviewed computer-generated diagrams specific to their diagnoses..  All questions were addressed to the patient's satisfaction.    1)  Well-woman:  Pap test: post TLH.  History of abnormal paps: No.  History of STIs: yes-unknown-treated in 20s  Mammogram: overdue--ordered, please schedule  Colonoscopy: Date of last: 6/2024.  Result:  polyp, suboptimal prep.  Repeat due:  2025.    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  Call to make appt:  [facility]ine [facility] on [address] (Nara Sin, manager).  (p) [phone].    --can try Vaginal suppositories  lidocaine 2%, valium 5 mg, baclofen 4% suppository  Disp: #20  Sig:  Apply 1 suppository vaginally 30 minutes before and/or after PT sessions or BID as needed for pain.   Refills: 5    [facility] (they deliver)  [address], [place] [address] [phone]    --treat Vaginal atrophy (dryness):    --start Vaginal estrogen:  --Use 0.5 grams of estrogen cream in vagina with applicator or dime-sized amount with finger (as far as can reach internally) nightly x 2 weeks, then twice a week thereafter.  You can also apply a dime-sized amount with your finger around the vaginal opening and inner lips at same frequency.      --Vaginal estrogen may help to decrease pain related to dryness with intercourse and urinary symptoms (urgency/frequency/UTIs) around menopause.      --Non-estrogen options for vaginal dryness (can use during intercourse):  --coconut oil: dime-sized amount with finger (as far as can reach internally) and around the vaginal opening and inner lips up to nightly as needed  --Uberlube, [place]ro[place], [place] liquibeads, Satin by Sliquid Natural Intimate Moisturizer    4)  Mixed urinary incontinence, urge > stress:    --urine C&S  --Empty bladder every 3 hours.  Empty well: wait a minute, lean forward on toilet.    --Avoid dietary irritants (see sheet).  Keep diary x 3-5 days to determine your irritants.  --start PT  --URGE: consider medication in future. Takes 2-4 weeks to see if will have effect.  For dry mouth: get sour, sugar free lozenge or gum.    --STRESS:  Pessary vs. Sling vs injections.     5)  RTC 4 months VV.     Approximately 60 min were spent in consult, 90 % in discussion.     Thank you for requesting consultation of your patient.  I look forward to participating in their care.    Justin Savage  Female Pelvic Medicine and Reconstructive Surgery  [facility] [place], [place]

## 2024-09-19 NOTE — PATIENT INSTRUCTIONS
Bladder Irritants  Certain foods and drinks have been associated with worsening symptoms of urinary frequency, urgency, urge incontinence, or bladder pain. If you suffer from any of these conditions, you may wish to try eliminating one or more of these foods from your diet and see if your symptoms improve. If bladder symptoms are related to dietary factors, strict adherence to a diet thateliminates the food should bring marked relief in 10 days. Once you are feeling better, you can begin to add foods back into your diet, one at a time. If symptoms return, you will be able to identify the irritant. As you add foods back to your diet it is very important that you drink significant amounts of water.    -----------------------------------------------------------------------------------------------  List of Common Bladder Irritants*  Alcoholic beverages  Apples and apple juice  Cantaloupe  Carbonated beverages  Chili and spicy foods  Chocolate  Citrus fruit  Coffee (including decaffeinated)  Cranberries and cranberry juice  Grapes  Guava  Milk Products: milk, cheese, cottage cheese, yogurt, ice cream  Peaches  Pineapple  Plums  Strawberries  Sugar especially artificial sweeteners, saccharin, aspartame, corn sweeteners, honey, fructose, sucrose, lactose  Tea  Tomatoes and tomato juice  Vitamin B complex  Vinegar  *Most people are not sensitive to ALL of these products; your goal is to find the foods that make YOUR symptoms worse.  ---------------------------------------------------------------------------------------------------    Low-acid fruit substitutions include apricots, papaya, pears and watermelon. Coffee drinkers can drink Kava or other lowacid instant drinks. Tea drinkers can substitute non-citrus herbal and sun brewed teas. Calcium carbonate co-buffered with calcium ascorbate can be substituted for Vitamin C. Prelief is a dietary supplement that works as an acid blocker for the bladder.    Where to get more  information:        Overcoming Bladder Disorders by Shannon Gonzalez and Marcella Martin, 1990        You Dont Have to Live with Cystitis! By Heidy Tran, 1988  http://www.urologymanagement.org/oab  -------------------------------------------------------------    1)  Well-woman:  Pap test: post TLH.  History of abnormal paps: No.  History of STIs: yes-unknown-treated in 20s  Mammogram: overdue--ordered, please schedule  Colonoscopy: Date of last: 6/2024.  Result:  polyp, suboptimal prep.  Repeat due:  2025.    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  Call to make appt:  [place]:  [facility] on [address] (Nara Sin, manager).  p) [phone].    --can try Vaginal suppositories  lidocaine 2%, valium 5 mg, baclofen 4% suppository  Disp: #20  Sig:  Apply 1 suppository vaginally 30 minutes before and/or after PT sessions or BID as needed for pain.   Refills: 5    [facility] (they deliver)  [address], [place] [address]  [phone]    --treat Vaginal atrophy (dryness):    --start Vaginal estrogen:  --Use 0.5 grams of estrogen cream in vagina with applicator or dime-sized amount with finger (as far as can reach internally) nightly x  2 weeks, then twice a week thereafter.  You can also apply a dime-sized amount with your finger around the vaginal opening and inner lips at same frequency.     --Vaginal estrogen may help to decrease pain related to dryness with intercourse and urinary symptoms (urgency/frequency/UTIs) around menopause.      --Non-estrogen options for vaginal dryness (can use during intercourse):  --coconut oil: dime-sized amount with finger (as far as can reach internally) and around the vaginal opening and inner lips up to nightly as needed  --Uberlube, [place], [place] liquibeads, Satin by Sliquid Natural Intimate Moisturizer    4)  Mixed urinary incontinence, urge > stress:    --urine C&S  --Empty bladder every 3 hours.  Empty well: wait a minute, lean forward on toilet.    --Avoid dietary irritants (see sheet).  Keep diary x 3-5 days to determine your irritants.  --start PT  --URGE: consider medication in future. Takes 2-4 weeks to see if will have effect.  For dry mouth: get sour, sugar free lozenge or gum.    --STRESS:  Pessary vs. Sling vs injections.     5)  RTC 4 months VV.

## 2024-09-20 LAB — BACTERIA UR CULT: NO GROWTH

## 2024-09-25 ENCOUNTER — CLINICAL SUPPORT (OUTPATIENT)
Dept: REHABILITATION | Facility: HOSPITAL | Age: 49
End: 2024-09-25
Attending: OBSTETRICS & GYNECOLOGY
Payer: COMMERCIAL

## 2024-09-25 DIAGNOSIS — M62.838 MUSCLE SPASM: ICD-10-CM

## 2024-09-25 DIAGNOSIS — R27.8 COORDINATION ABNORMAL: Primary | ICD-10-CM

## 2024-09-25 DIAGNOSIS — M79.18 MYALGIA OF PELVIC FLOOR: ICD-10-CM

## 2024-09-25 DIAGNOSIS — N94.10 DYSPAREUNIA, FEMALE: ICD-10-CM

## 2024-09-25 DIAGNOSIS — N39.46 MIXED STRESS AND URGE URINARY INCONTINENCE: ICD-10-CM

## 2024-09-25 PROCEDURE — 97163 PT EVAL HIGH COMPLEX 45 MIN: CPT | Mod: PN | Performed by: PHYSICAL THERAPIST

## 2024-09-25 PROCEDURE — 97530 THERAPEUTIC ACTIVITIES: CPT | Mod: PN | Performed by: PHYSICAL THERAPIST

## 2024-09-25 NOTE — PLAN OF CARE
[facility]   Physical Therapy Initial Evaluation     Date: 9/25/2024   Name: Leora Steward  Clinic Number: [id number]    Therapy Diagnosis:   Encounter Diagnoses   Name Primary?    Mixed stress and urge urinary incontinence     Myalgia of pelvic floor     Dyspareunia, female     Coordination abnormal Yes    Muscle spasm      Physician: Justin Savage MD    Physician Orders: PT Eval and Treat PF PT  Medical Diagnosis from Referral: N39.46 (ICD-10-CM) - Mixed stress and urge urinary incontinence M79.18 (ICD-10-CM) - Myalgia of pelvic floor N94.10 (ICD-10-CM) - Dyspareunia, female  Evaluation Date: 9/25/2024  Authorization Period Expiration: 12/31/2024  Plan of Care Expiration: 12/18/2024  Progress Note Due: 10/23/2024  Visit # 1/12 Visits authorized: 1/ 1   FOTO: 1/3    Precautions: Standard     Time In: 7:38 AM   Time Out: 8:55 AM   Total Appointment Time (timed & untimed codes): 77 minutes    HISTORY      Leora is a 48 y.o. female evaluated on 09/25/2024    Physician:  Justin Savage MD   Diagnosis:   Encounter Diagnoses   Name Primary?    Mixed stress and urge urinary incontinence     Myalgia of pelvic floor     Dyspareunia, female     Coordination abnormal Yes    Muscle spasm       Treatment ordered: Physical Therapy  Medical History:   Past Medical History:   Diagnosis Date    Anxiety     Arthritis     COPD (chronic obstructive pulmonary disease)     Depression     Neuromuscular disorder       Surgical History:   Past Surgical History:   Procedure Laterality Date    ANORECTAL MANOMETRY N/A 5/1/2024    Procedure: MANOMETRY, ANORECTAL;  Surgeon: Austin Stewart MD;  Location: [address];  Service: Endoscopy;  Laterality: N/A;  Ref By: ,instr sent via portal,traveling enema at check in.  4/25-instructions resent to myochprincess, precall complete, pt knows no ride needed-Kpvt    CHOLECYSTECTOMY      COLONOSCOPY N/A 6/28/2024    Procedure: COLONOSCOPY;   Surgeon: Corey Howard MD;  Location: [facility] (4TH FLR);  Service: Endoscopy;  Laterality: N/A;  6/14/24- case length adjusted - ERW    ESOPHAGOGASTRODUODENOSCOPY N/A 6/28/2024    Procedure: EGD (ESOPHAGOGASTRODUODENOSCOPY);  Surgeon: Corey Howard MD;  Location: [facility] (4TH FLR);  Service: Endoscopy;  Laterality: N/A;  Ref By: ,instr sent via [employer].  pre call complete 6/21, instructions reviewed -     HEMORRHOID SURGERY - about 12 years ago       HYSTERECTOMY - in her 30s - due to endometriosis       full    LAPAROSCOPIC GASTROENTEROSTOMY N/A 10/16/2019    Procedure: GASTROENTEROSTOMY, LAPAROSCOPIC, with intraop EGD;  Surgeon: Edmundo Ulrich MD;  Location: [facility] OR 2ND FLR;  Service: General;  Laterality: N/A;    PANNICULECTOMY N/A 03/07/2023    Procedure: PANNICULECTOMY;  Surgeon: Cabrera Bardales MD;  Location: [facility] OR 2ND FLR;  Service: Plastics;  Laterality: N/A;    TUBAL LIGATION        Medications:   Current Outpatient Medications   Medication Sig    acetaminophen (TYLENOL 8 HOUR ORAL) Take by mouth as needed. (Patient not taking: Reported on 9/19/2024)    albuterol (ACCUNEB) 1.25 mg/3 mL Nebu Take 1.25 mg by nebulization every 6 (six) hours as needed. Rescue    albuterol (PROVENTIL) 5 mg/mL nebulizer solution Take 2.5 mg by nebulization every 6 (six) hours as needed for Wheezing. Rescue    b complex vitamins tablet Take 1 tablet by mouth once daily. Vitamin B 12 1200 mcg sublingual   No B1    buPROPion (WELLBUTRIN XL) 150 MG TB24 tablet Take 150 mg by mouth once daily.    CALCIUM CITRATE ORAL Take 1 tablet by mouth 3 (three) times daily. (Patient not taking: Reported on 9/19/2024)    CAPLYTA 42 mg Cap Take 1 capsule by mouth. For sleep     estradioL (ESTRACE) 0.01 % (0.1 mg/gram) vaginal cream 0.5 grams with applicator or dime-sized amount with finger in vagina nightly x 2 weeks, then twice a week thereafter - recently started    ferrous gluconate (FERGON)  324 MG tablet Take 1 tablet (324 mg total) by mouth daily with breakfast.    ibuprofen (ADVIL,MOTRIN) 200 MG tablet Take 200 mg by mouth every 6 (six) hours as needed for Pain. (Patient not taking: Reported on 9/19/2024)    montelukast (SINGULAIR) 10 mg tablet     multivitamin (THERAGRAN) per tablet Take 1 tablet by mouth 2 (two) times daily.    sertraline (ZOLOFT) 50 MG tablet Take 1 tablet by mouth once daily.    thiamine (VITAMIN B-1) 50 MG tablet Take 50 mg by mouth once daily.    traZODone (DESYREL) 50 MG tablet Take 50 mg by mouth every evening.    UNABLE TO FIND once daily. [place] (Patient not taking: Reported on 9/19/2024)    VYVANSE 70 mg capsule Take 70 mg by mouth.     No current facility-administered medications for this visit.       Allergies:   Review of patient's allergies indicates:   Allergen Reactions    Sulfamethoxazole-trimethoprim         Precautions: universal    OB/GYN History:  1st pregnancy - twins (1 survived), single pregnancy after that - 2 vaginal deliveries with deliveries (largest was almost 10lbs)     Bladder/Bowel History: trouble initiating urine stream, childhood bladder problems, trouble emptying bladder completely, recurrent bladder infections, urinary incontinence, constipation/straining for movement, and straining or pushing to empty bladder      SUBJECTIVE     Date of onset: long history of pain with intercourse and urinary leakage (about 20 years)     History of current complaint: States that her constipation is doing better since she has increased her fiber and added Magnesium Citrate (400mg). States that she did not clean out for her colonoscopy. She did try oral hormones after her hysterectomy in her 30s so she stopped. Diagnosed with IC when she was in her 20s.     Developed and ulcer in her rectum due to constant bowel movements with Metformin. Has lost about 120lbs since her gastric bypass surgery.     Feels tired all the time. Also reports fracturing her  "tailbone 2xs over the years - one with her delivery and one while skating.     Having surgery for trigger finger on October 9th.     Does not have a local gyn.     Patient's goals for therapy: "enjoying intercourse" and improve bowel and bladder habits     Pain: Patient reports 0/10, with 0 being the lowest and 10 being the highest.    Activities that cause symptoms: strong urge to go, walking to the toilet, full bladder, with cough/sneeze/laugh/yell, sexual activity, tight clothing, prolonged sitting, voiding, and defecating    Previous treatment included medical management     Sexually active? No - due to pain, pain with deeper penetration, pain with gyn exam, has tried Vaseline as a lubricant    Frequency of urination:   Daytime: depends on hydration           Nighttime: denies     Difficulty initiating urine stream: Yes - has to push at times   Urine stream: strong, will empty more when leaning forward  Complete emptying: No  Bladder leakage: Yes  Frequency of incidents: not daily   Amount leaked (urine): teaspoons    Frequency of bowel movements: 2xs a week now that she is doing fiber   Difficulty initiating BM: Yes  Quality/Shape of BM: Hitchcock Stool Chart 1-2, has to manually assist   Colon leakage: Yes  Frequency of incidents: "more than I want it to"    Amount leaked (bowels): streaking/staining  Form of protection: none "I need to"   Number of pads required in 24 hours: none     Types of fluid intake: water up to 80oz, also does strawberry lemonade, coffee, [employer]   Diet: balanced diet - smaller meals   Current exercise:not currently since Covid    Occupation: Pt worked for the [employer] for 12 years,  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 Looking for work currently.     OBJECTIVE   Mary participated in dynamic functional therapeutic activities to improve functional performance for 47  minutes, including:  Educated on pelvic floor anatomy and physiology. " Encouraged good hydration. Urinating every 3-4 hours. Discussed a honey comb cushion to assist with coccyx pain. Encouraged high quality lubricant with intercourse. Educated on the pain cycle and not tolerating pain. Educated on posture with sitting especially with return to work.       ORTHO SCREEN  Posture: rigid posture   Pelvic alignment: not assessed    ABDOMINALS  Scarring: reports extensive scarring following  panniculectomy     VAGINAL PELVIC FLOOR EXAM - to be performed next visit    RECTAL PELVIC FLOOR EXAM - to be considered    TREATMENT      Education: instructed on general anatomy/physiology of urinary/bowel system; discussed plan of care with patient and parent/guardian; instructed in benefits/risks of treatment; instructed in alternative methods of treatment; instructed in risks of refusing treatment; patient a parent agreed to treatment plan.     Also educated in: anatomy/physiology of pelvic floor, posture/body mechanices, diaphragmatic breathing, fluid intake/dietary modifications, and behavior modifications    ASSESSMENT      This is a 48 y.o. female referred to outpatient physical therapy and presents with a medical diagnosis of N39.46 (ICD-10-CM) - Mixed stress and urge urinary incontinence M79.18 (ICD-10-CM) - Myalgia of pelvic floor N94.10 (ICD-10-CM) - Dyspareunia, female. Patient will benefit from skilled physical therapy to improve core and PF awareness and mobility, improve bowel and bladder habits, improve tolerance for return to work.    Educational/Spiritual/Cultural needs: none  Abuse/Neglect: no signs  Nutritional Status: WDWN   Fall Risk: patient is not a fall risk    Pt's spiritual, cultural and educational needs considered and pt agreeable to plan of care and goals as stated below:     Medical Necessity is demonstrated by the following:  History  Co-morbidities and personal factors that may impact the plan of care [] LOW: no personal factors / co-morbidities  [] MODERATE: 1-2  personal factors / co-morbidities  [x] HIGH: 3+ personal factors / co-morbidities    Moderate / High Support Documentation:   Co-morbidities affecting plan of care: none    Personal Factors:   Bowel and bladder dysfunction, coccyx injury, IC, endometriosis     Examination  Body Structures and Functions, activity limitations and participation restrictions that may impact the plan of care [] LOW: addressing 1-2 elements  [] MODERATE: 3+ elements  [x] HIGH: 4+ elements (please support below)    Moderate / High Support Documentation: bowel and bladder, coccyx, pelvic floor dysfunction     Clinical Presentation [] LOW: stable  [] MODERATE: Evolving  [x] HIGH: Unstable     Decision Making/ Complexity Score: high           PLAN    Frequency: 1x per week  Duration: 12 weeks    Short Term Goals: 6 weeks   Patient will report improve leakage of urine.  Patient will be independent with pelvic floor relaxation to improve bowel and bladder evacuation.   Patient will be able to demonstrate improved pelvic floor relaxation and contraction on rehabilitative ultrasound.   Patient will report pelvic pain of less than 5/10 with vaginal penetration.  Patient will report improved water intake and decrease caffeine intake.     Long Term Goals: 12 weeks   Patient will report urinating every 3-4 hours with strong urine stream.   Patient will deny fecal incontinence.  Patient will report bowel evacuation every 1-2 days.   Patient will deny pelvic pain with vaginal penetration.  Patient will be compliant with vaginal dilator vs. Pelvic wand use.  Patient will demonstrate adequate pelvic floor coordination with contraction and relaxation on sEMG vs rehabilitative ultrasound.      Physical therapy will include: therapeutic exercise, manual therapy, therapeutic activities, neuromuscular re-education, manual therapy, modalities PRN, gait training, and self-care education.       Therapist: Dago Yee, PT  Board-certified Women's Health  Clinical Specialist  9/25/2024

## 2024-09-27 ENCOUNTER — TELEPHONE (OUTPATIENT)
Dept: UROGYNECOLOGY | Facility: CLINIC | Age: 49
End: 2024-09-27
Payer: COMMERCIAL

## 2024-09-27 PROBLEM — N95.2 VAGINAL ATROPHY: Status: ACTIVE | Noted: 2024-09-27

## 2024-09-27 PROBLEM — N39.46 MIXED STRESS AND URGE URINARY INCONTINENCE: Status: ACTIVE | Noted: 2024-09-27

## 2024-09-27 PROBLEM — N81.6 CYSTOCELE WITH RECTOCELE: Status: ACTIVE | Noted: 2024-09-27

## 2024-09-27 PROBLEM — N81.10 CYSTOCELE WITH RECTOCELE: Status: ACTIVE | Noted: 2024-09-27

## 2024-09-27 PROBLEM — N94.10 DYSPAREUNIA, FEMALE: Status: ACTIVE | Noted: 2024-09-27

## 2024-09-27 PROBLEM — K59.04 CHRONIC IDIOPATHIC CONSTIPATION: Status: ACTIVE | Noted: 2024-09-27

## 2024-09-27 PROBLEM — M79.18 MYALGIA OF PELVIC FLOOR: Status: ACTIVE | Noted: 2024-09-27

## 2024-09-27 PROBLEM — E89.40 SURGICAL MENOPAUSE: Status: ACTIVE | Noted: 2024-09-27

## 2024-09-27 NOTE — TELEPHONE ENCOUNTER
Called the prescription into [facility].            ----- Message from Justin Savage MD sent at 9/27/2024  1:20 PM CDT -----  Regarding: please call in Rx to [facility]  Can you please call in Rx to [facility] for patient?  Vaginal suppositories  lidocaine 2%, valium 5 mg, baclofen 4% suppository  Disp: #20  Sig:  Apply 1 suppository vaginally 30 minutes before and/or after PT sessions or BID as needed for pain.   Refills: 5    [facility] (they deliver)  [address], [place] [address]  [phone]    Thanks!

## 2024-10-01 ENCOUNTER — PATIENT MESSAGE (OUTPATIENT)
Dept: BARIATRICS | Facility: CLINIC | Age: 49
End: 2024-10-01
Payer: COMMERCIAL

## 2024-10-02 ENCOUNTER — TELEPHONE (OUTPATIENT)
Dept: UROGYNECOLOGY | Facility: CLINIC | Age: 49
End: 2024-10-02
Payer: COMMERCIAL

## 2024-10-02 NOTE — TELEPHONE ENCOUNTER
Spoke to patient.  Let patient know bone density test was normal.  Patient verbalized understanding.  Call ended.

## 2024-10-02 NOTE — TELEPHONE ENCOUNTER
----- Message from Justin Savage MD sent at 10/1/2024  9:47 PM CDT -----  Please let patient know bone density test was normal.  Thanks!

## 2024-10-04 ENCOUNTER — TELEPHONE (OUTPATIENT)
Dept: UROGYNECOLOGY | Facility: CLINIC | Age: 49
End: 2024-10-04
Payer: COMMERCIAL

## 2024-10-04 NOTE — TELEPHONE ENCOUNTER
Called pt and relayed Dr. Savage's message regarding mammogram results. Pt verbalized understanding, call ended.      ----- Message from Justin Savage MD sent at 10/3/2024  5:30 PM CDT -----  Please let patient know mammogram was normal. Repeat due 1 year. Thanks!

## 2024-10-07 ENCOUNTER — CLINICAL SUPPORT (OUTPATIENT)
Dept: REHABILITATION | Facility: HOSPITAL | Age: 49
End: 2024-10-07
Payer: COMMERCIAL

## 2024-10-07 ENCOUNTER — PATIENT MESSAGE (OUTPATIENT)
Dept: UROGYNECOLOGY | Facility: CLINIC | Age: 49
End: 2024-10-07
Payer: COMMERCIAL

## 2024-10-07 DIAGNOSIS — M62.838 MUSCLE SPASM: ICD-10-CM

## 2024-10-07 DIAGNOSIS — N94.10 DYSPAREUNIA, FEMALE: ICD-10-CM

## 2024-10-07 DIAGNOSIS — N39.46 MIXED STRESS AND URGE URINARY INCONTINENCE: Primary | ICD-10-CM

## 2024-10-07 DIAGNOSIS — M79.18 MYALGIA OF PELVIC FLOOR: ICD-10-CM

## 2024-10-07 DIAGNOSIS — R27.8 COORDINATION ABNORMAL: ICD-10-CM

## 2024-10-07 DIAGNOSIS — N95.2 VAGINAL ATROPHY: ICD-10-CM

## 2024-10-07 PROCEDURE — 97140 MANUAL THERAPY 1/> REGIONS: CPT | Mod: PN | Performed by: PHYSICAL THERAPIST

## 2024-10-07 PROCEDURE — 97530 THERAPEUTIC ACTIVITIES: CPT | Mod: PN | Performed by: PHYSICAL THERAPIST

## 2024-10-07 PROCEDURE — 97112 NEUROMUSCULAR REEDUCATION: CPT | Mod: PN | Performed by: PHYSICAL THERAPIST

## 2024-10-07 RX ORDER — ESTRADIOL 0.1 MG/G
1 CREAM VAGINAL
Qty: 42.5 G | Refills: 11 | Status: SHIPPED | OUTPATIENT
Start: 2024-10-07

## 2024-10-07 NOTE — PATIENT INSTRUCTIONS
Mindfulness: being aware of your body in space  - if you feel tension, let it go   - 1st step is to be aware      DIAPHRAGMATIC BREATHING     The diaphragm is a dome shaped muscle that forms the floor of the rib cage. It is the most efficient muscle for breathing and relaxation, although most people are not used to using the diaphragm. Diaphragmatic or belly breathing is an important technique to learn because it helps settle down or relax the autonomic nervous system. The correct use of diaphragmatic breathing can help to quiet brain activity resulting in the relaxation of all the muscles and organs of the body. This is accomplished by slow rhythmic breathing concentrated in the diaphragm muscle rather than the chest.    How to do proper relaxation breathing:    Start by lying on your back or reclining in a chair in a relaxed position. Place one hand on your chest and the other on your abdomen.  Relax your jaw by placing your tongue on the floor of your mouth and keeping your teeth slightly apart.   Take a deep breath in, letting the abdomen expand and rise while you keep your upper chest, neck and shoulders relaxed.   As you breathe out, allow your abdomen and chest to fall. Exhale completely.  It doesn't matter if you breathe in/out through your nose and/or mouth. Do whichever feels comfortable.  Remember to breathe slowly.  Do not force your breathing. Do not hold your breath.  Repeat for 5 minutes every day.           Our body's natural response is to guard and protect   - reason why we do not tolerate pain      Use breathing on the toilet and to assist with pain.   Practice when you are feeling good as well as when you are hurting.     Think of the pelvic floor like a trampoline - let it stretch when you step on it

## 2024-10-07 NOTE — PROGRESS NOTES
Pelvic Health Physical Therapy   Treatment Note     Name: Leora Steward  Clinic Number: [id number]    Therapy Diagnosis:   Encounter Diagnoses   Name Primary?    Mixed stress and urge urinary incontinence Yes    Myalgia of pelvic floor     Dyspareunia, female     Coordination abnormal     Muscle spasm      Physician: Justin Savage MD    Visit Date: 10/7/2024    Physician Orders: PT Eval and Treat PF PT  Medical Diagnosis from Referral: N39.46 (ICD-10-CM) - Mixed stress and urge urinary incontinence M79.18 (ICD-10-CM) - Myalgia of pelvic floor N94.10 (ICD-10-CM) - Dyspareunia, female  Evaluation Date: 9/25/2024  Authorization Period Expiration: 12/31/2024  Plan of Care Expiration: 12/18/2024  Progress Note Due: 10/23/2024  Visit # 2/12 Visits authorized: 1/20  FOTO: 1/3     Cancelled Visits: 0  No Show Visits: 0    Time In: 10:19 AM   Time Out: 11:19 AM   Total Billable Time: 60 minutes    Precautions: Standard    Subjective     Pt reports: Denies any questions. She ran out of vaginal estrogen unable to get it refilled again due to it being too soon.   Was unable to complete her 2 weeks. Had surgery for left trigger finger on Wednesday of last week. States that she has as steady/dull pelvic pain.     She was compliant with home exercise program.  Response to previous treatment: none   Functional change: none     Pain in:  0/10  Pain out: 0/10  Location: pelvic pain       Objective     Mary received the following manual therapy techniques: 10 minutes including:   VAGINAL PELVIC FLOOR EXAM    EXTERNAL ASSESSMENT  Introitus: WNL  Skin condition: WNL  Scarring: none   Sensation: WNL   Pain:  none  Voluntary contraction: nil  Voluntary relaxation: nil  Involuntary contraction: nil  Bearing down: reflex tightening  Perineal descent: absent      INTERNAL ASSESSMENT  Pain: trigger points as follows: bilateral bulbocavernosus    Sensation: WNL  Vaginal vault: stenotic   Muscle Bulk: hypertonus   Muscle Power: 3/5     Quality of contraction: incomplete relaxation    Prolapse check: none   Comments: with attempts at PF relaxation, pt with increased PF tension.     Mary participated in neuromuscular re-education activities to develop Coordination, Down training, and Proprioception for 15 minutes including: pelvic floor relaxation/bulging training and rehabilitative ultrasound imaging to help visualize pelvic floor muscle contraction and relaxation with kegels  Patient responded well to breathing mechanics and PF awareness.     Mary participated in dynamic functional therapeutic activities to improve functional performance for 35  minutes, including:  Educated on pelvic floor awareness and down training. Encouraged body awareness.           Intervention Eval  10/07/2024           Neuro Re-Ed Rehabilitative ultrasound   Perineal -  pelvic floor relaxation/bulging training and rehabilitative ultrasound imaging to help visualize pelvic floor muscle contraction and relaxation with kegels  Patient responded well to breathing mechanics and PF awareness.      Manual Ther Vaginal exam   Mild pain, hypertonus, impaired mobility     TherAct    Educated on pelvic floor awareness and down training. Encouraged body awareness.          Home Exercises Provided and Patient Education Provided     Education provided:   - anatomy/physiology of pelvic floor, posture/body mechanices, and diaphragmatic breathing  Discussed progression of plan of care with patient; educated pt in activity modification; reviewed HEP with pt. Pt demonstrated and verbalized understanding of all instruction and was provided with a handout of HEP (see Patient Instructions).    Written Home Exercises Provided: yes.  Exercises were reviewed and Mary was able to demonstrate them prior to the end of the session.  Mary demonstrated good  understanding of the education provided.     See EMR under Patient Instructions for exercises provided 10/07/2024 .    Assessment     Pt  with good understanding of physical therapist recommendations. Improved pelvic floor awareness after treatment.     Mary Is progressing well towards her goals.   Pt prognosis is Good.     Pt will continue to benefit from skilled outpatient physical therapy to address the deficits listed in the problem list box on initial evaluation, provide pt/family education and to maximize pt's level of independence in the home and community environment.     Pt's spiritual, cultural and educational needs considered and pt agreeable to plan of care and goals.     Anticipated barriers to physical therapy: none     Short Term Goals: 6 weeks   Patient will report improve leakage of urine.  Patient will be independent with pelvic floor relaxation to improve bowel and bladder evacuation.   Patient will be able to demonstrate improved pelvic floor relaxation and contraction on rehabilitative ultrasound.   Patient will report pelvic pain of less than 5/10 with vaginal penetration.  Patient will report improved water intake and decrease caffeine intake.      Long Term Goals: 12 weeks   Patient will report urinating every 3-4 hours with strong urine stream.   Patient will deny fecal incontinence.  Patient will report bowel evacuation every 1-2 days.   Patient will deny pelvic pain with vaginal penetration.  Patient will be compliant with vaginal dilator vs. Pelvic wand use.  Patient will demonstrate adequate pelvic floor coordination with contraction and relaxation on sEMG vs rehabilitative ultrasound.       Plan     Continue with progressive PF mobility training. Was using vaginal estrogen incorrectly, so ran out prematurely.     Dago Yee, PT

## 2024-10-08 ENCOUNTER — PATIENT MESSAGE (OUTPATIENT)
Dept: BARIATRICS | Facility: CLINIC | Age: 49
End: 2024-10-08
Payer: COMMERCIAL

## 2024-10-27 DIAGNOSIS — D50.9 IRON DEFICIENCY ANEMIA, UNSPECIFIED IRON DEFICIENCY ANEMIA TYPE: ICD-10-CM

## 2024-10-29 RX ORDER — FERROUS GLUCONATE 324(37.5)
324 TABLET ORAL
Qty: 90 TABLET | Refills: 1 | Status: SHIPPED | OUTPATIENT
Start: 2024-10-29

## 2024-10-31 ENCOUNTER — CLINICAL SUPPORT (OUTPATIENT)
Dept: REHABILITATION | Facility: HOSPITAL | Age: 49
End: 2024-10-31
Payer: COMMERCIAL

## 2024-10-31 DIAGNOSIS — N39.46 MIXED STRESS AND URGE URINARY INCONTINENCE: Primary | ICD-10-CM

## 2024-10-31 DIAGNOSIS — R27.8 COORDINATION ABNORMAL: ICD-10-CM

## 2024-10-31 DIAGNOSIS — M79.18 MYALGIA OF PELVIC FLOOR: ICD-10-CM

## 2024-10-31 DIAGNOSIS — N94.10 DYSPAREUNIA, FEMALE: ICD-10-CM

## 2024-10-31 DIAGNOSIS — M62.838 MUSCLE SPASM: ICD-10-CM

## 2024-10-31 PROCEDURE — 97112 NEUROMUSCULAR REEDUCATION: CPT | Mod: PN | Performed by: PHYSICAL THERAPIST

## 2024-10-31 PROCEDURE — 97530 THERAPEUTIC ACTIVITIES: CPT | Mod: PN | Performed by: PHYSICAL THERAPIST

## 2024-11-02 ENCOUNTER — PATIENT MESSAGE (OUTPATIENT)
Dept: BARIATRICS | Facility: CLINIC | Age: 49
End: 2024-11-02
Payer: COMMERCIAL

## 2024-11-07 ENCOUNTER — CLINICAL SUPPORT (OUTPATIENT)
Dept: REHABILITATION | Facility: HOSPITAL | Age: 49
End: 2024-11-07
Payer: COMMERCIAL

## 2024-11-07 DIAGNOSIS — M79.18 MYALGIA OF PELVIC FLOOR: ICD-10-CM

## 2024-11-07 DIAGNOSIS — R27.8 COORDINATION ABNORMAL: ICD-10-CM

## 2024-11-07 DIAGNOSIS — N94.10 DYSPAREUNIA, FEMALE: ICD-10-CM

## 2024-11-07 DIAGNOSIS — M62.838 MUSCLE SPASM: ICD-10-CM

## 2024-11-07 DIAGNOSIS — N39.46 MIXED STRESS AND URGE URINARY INCONTINENCE: Primary | ICD-10-CM

## 2024-11-07 PROCEDURE — 97530 THERAPEUTIC ACTIVITIES: CPT | Mod: PN | Performed by: PHYSICAL THERAPIST

## 2024-11-07 NOTE — PATIENT INSTRUCTIONS
Apps to assist:   - Insight Timer   - Calm  - Headspace     Practice when you are feeling good so it is easier to accomplish when you are not feeling well.    Increase abdominal massage - a few times a day   Focus on water intake!  When you eat, take your time chewing your food - digestion starts at our lips - take the mental break as well       [email or web address]  [email or web address]

## 2024-11-07 NOTE — PROGRESS NOTES
Pelvic Health Physical Therapy   Treatment Note      Name: Leora Steward  Clinic Number: [id number]    Therapy Diagnosis:   Encounter Diagnoses   Name Primary?    Mixed stress and urge urinary incontinence Yes    Myalgia of pelvic floor     Coordination abnormal     Muscle spasm     Dyspareunia, female      Physician: Justin Savage MD    Visit Date: 11/7/2024    Physician Orders: PT Eval and Treat PF PT  Medical Diagnosis from Referral: N39.46 (ICD-10-CM) - Mixed stress and urge urinary incontinence M79.18 (ICD-10-CM) - Myalgia of pelvic floor N94.10 (ICD-10-CM) - Dyspareunia, female  Evaluation Date: 9/25/2024  Authorization Period Expiration: 12/31/2024  Plan of Care Expiration: 12/18/2024  Progress Note Due: 11/30/2024  Visit # 4/12 Visits authorized: 3/20  FOTO: 2/3     Cancelled Visits: 0  No Show Visits: 0    Time In: 7:11 AM    Time Out: 7:50 AM   Total Billable Time: 39 minutes    Precautions: Standard    Subjective     Pt reports: Took Exlax last night. Woke up to have a bowel movement. Trying to do her belly massage. Has not attempted intercourse.     She was compliant with home exercise program.  Response to previous treatment: none   Functional change: none     Pain in:  0/10  Pain out: 0/10  Location: pelvic pain       Objective     Mary participated in dynamic functional therapeutic activities to improve functional performance for 39  minutes, including:  Educated on meditation and mindfulness to assist with bowel management. Educated on pelvic wands and vaginal dilators.       Intervention Eval  10/07/2024 10/31/2024  11/07/2024            Neuro Re-Ed Rehabilitative ultrasound   Perineal -  pelvic floor relaxation/bulging training and rehabilitative ultrasound imaging to help visualize pelvic floor muscle contraction and relaxation with kegels  Patient responded well to breathing mechanics and PF awareness.   pelvic floor relaxation/bulging training and rehabilitative ultrasound imaging to help  visualize pelvic floor muscle contraction and relaxation with kegels  Patient responded well to breathing mechanics and PF awareness. Still with limited PF down training.     Manual Ther Vaginal exam   Mild pain, hypertonus, impaired mobility      TherAct    Educated on pelvic floor awareness and down training. Encouraged body awareness.   Educated on gastro colic reflex and scar massage to assist with gastric motility.       Educated on meditation and mindfulness to assist with bowel management. Educated on pelvic wands and vaginal dilators.        Home Exercises Provided and Patient Education Provided     Education provided:   - anatomy/physiology of pelvic floor, posture/body mechanices, and diaphragmatic breathing  Discussed progression of plan of care with patient; educated pt in activity modification; reviewed HEP with pt. Pt demonstrated and verbalized understanding of all instruction and was provided with a handout of HEP (see Patient Instructions).    Written Home Exercises Provided: yes.  Exercises were reviewed and Mary was able to demonstrate them prior to the end of the session.  Mary demonstrated good  understanding of the education provided.     See EMR under Patient Instructions for exercises provided 11/07/2024 .    Assessment     Pt with good understanding of physical therapist recommendations. Willing to comply.    Mary Is progressing well towards her goals.   Pt prognosis is Good.     Pt will continue to benefit from skilled outpatient physical therapy to address the deficits listed in the problem list box on initial evaluation, provide pt/family education and to maximize pt's level of independence in the home and community environment.     Pt's spiritual, cultural and educational needs considered and pt agreeable to plan of care and goals.     Anticipated barriers to physical therapy: none     Short Term Goals: 6 weeks   Patient will report improve leakage of urine. Goal met 10/31/2024    Patient will be independent with pelvic floor relaxation to improve bowel and bladder evacuation. Goal not met - progressing   Patient will be able to demonstrate improved pelvic floor relaxation and contraction on rehabilitative ultrasound. Goal not met - progressing  Patient will report pelvic pain of less than 5/10 with vaginal penetration. Goal not met - progressing - was no painful the last time  Patient will report improved water intake and decrease caffeine intake. Goal met 10/31/2024      Long Term Goals: 12 weeks   Patient will report urinating every 3-4 hours with strong urine stream. Goal not met - progressing   Patient will deny fecal incontinence. Goal met 10/31/2024   Patient will report bowel evacuation every 1-2 days. Goal not met - progressing - good bowel movement every week to week and a 1/2  Patient will deny pelvic pain with vaginal penetration. Goal met 10/31/2024   Patient will be compliant with vaginal dilator vs. Pelvic wand use. Goal not met - progressing   Patient will demonstrate adequate pelvic floor coordination with contraction and relaxation on sEMG vs rehabilitative ultrasound.  Goal not met - progressing      Plan     Continue with progressive PF mobility training. Was using vaginal estrogen incorrectly, so ran out prematurely.     Taking Magnesium and fiber. Will perform pelvic floor manual therapy next visit.     Dago Yee, PT

## 2024-11-12 ENCOUNTER — PATIENT MESSAGE (OUTPATIENT)
Dept: BARIATRICS | Facility: CLINIC | Age: 49
End: 2024-11-12
Payer: COMMERCIAL

## 2024-11-13 ENCOUNTER — CLINICAL SUPPORT (OUTPATIENT)
Dept: REHABILITATION | Facility: HOSPITAL | Age: 49
End: 2024-11-13
Payer: COMMERCIAL

## 2024-11-13 DIAGNOSIS — M79.18 MYALGIA OF PELVIC FLOOR: ICD-10-CM

## 2024-11-13 DIAGNOSIS — M62.838 MUSCLE SPASM: ICD-10-CM

## 2024-11-13 DIAGNOSIS — N39.46 MIXED STRESS AND URGE URINARY INCONTINENCE: Primary | ICD-10-CM

## 2024-11-13 DIAGNOSIS — R27.8 COORDINATION ABNORMAL: ICD-10-CM

## 2024-11-13 DIAGNOSIS — N94.10 DYSPAREUNIA, FEMALE: ICD-10-CM

## 2024-11-13 PROCEDURE — 97530 THERAPEUTIC ACTIVITIES: CPT | Mod: PN | Performed by: PHYSICAL THERAPIST

## 2024-11-13 PROCEDURE — 97140 MANUAL THERAPY 1/> REGIONS: CPT | Mod: PN | Performed by: PHYSICAL THERAPIST

## 2024-11-13 NOTE — PROGRESS NOTES
Pelvic Health Physical Therapy   Treatment Note      Name: Leora Steward  Clinic Number: [id number]    Therapy Diagnosis:   Encounter Diagnoses   Name Primary?    Mixed stress and urge urinary incontinence Yes    Myalgia of pelvic floor     Coordination abnormal     Muscle spasm     Dyspareunia, female      Physician: Justni Savage MD    Visit Date: 11/13/2024    Physician Orders: PT Eval and Treat PF PT  Medical Diagnosis from Referral: N39.46 (ICD-10-CM) - Mixed stress and urge urinary incontinence M79.18 (ICD-10-CM) - Myalgia of pelvic floor N94.10 (ICD-10-CM) - Dyspareunia, female  Evaluation Date: 9/25/2024  Authorization Period Expiration: 12/31/2024  Plan of Care Expiration: 12/18/2024  Progress Note Due: 11/30/2024  Visit # 5/12 Visits authorized: 4/20  FOTO: 2/3     Cancelled Visits: 0  No Show Visits: 0    Time In: 2:39 PM   Time Out: 3:08 PM   Total Billable Time: 29 minutes    Precautions: Standard    Subjective     Pt reports: Will order vaginal dilator and pelvic wand. Had a really good bowel movement the other day - she was very bloated and not feeling well. She moved on her own.     She was compliant with home exercise program.  Response to previous treatment: none   Functional change: none     Pain in:  0/10  Pain out: 0/10  Location: pelvic pain       Objective     Mary participated in dynamic functional therapeutic activities to improve functional performance for 14  minutes, including: Encouraged PF awareness and down training with pelvic floor manual therapy.     Mary received the following manual therapy techniques: to develop flexibility and extensibility for 15 minutes including: trigger point/myofascial release of bilateral bulbocavernosus and bilateral levator ani          Intervention Eval  10/07/2024 10/31/2024  11/07/2024  11/13/2024             Neuro Re-Ed Rehabilitative ultrasound   Perineal -  pelvic floor relaxation/bulging training and rehabilitative ultrasound imaging to  help visualize pelvic floor muscle contraction and relaxation with kegels  Patient responded well to breathing mechanics and PF awareness.   pelvic floor relaxation/bulging training and rehabilitative ultrasound imaging to help visualize pelvic floor muscle contraction and relaxation with kegels  Patient responded well to breathing mechanics and PF awareness. Still with limited PF down training.      Manual Ther Vaginal exam   Mild pain, hypertonus, impaired mobility    trigger point/myofascial release of bilateral bulbocavernosus and bilateral levator ani    TherAct    Educated on pelvic floor awareness and down training. Encouraged body awareness.   Educated on gastro colic reflex and scar massage to assist with gastric motility.       Educated on meditation and mindfulness to assist with bowel management. Educated on pelvic wands and vaginal dilators.  Encouraged PF awareness and down training with pelvic floor manual therapy.        Home Exercises Provided and Patient Education Provided     Education provided:   - anatomy/physiology of pelvic floor, posture/body mechanices, and diaphragmatic breathing  Discussed progression of plan of care with patient; educated pt in activity modification; reviewed HEP with pt. Pt demonstrated and verbalized understanding of all instruction and was provided with a handout of HEP (see Patient Instructions).    Written Home Exercises Provided: yes.  Exercises were reviewed and Mary was able to demonstrate them prior to the end of the session.  Mary demonstrated good  understanding of the education provided.     See EMR under Patient Instructions for exercises provided 11/13/2024 .    Assessment     Pt with good understanding of physical therapist recommendations. Willing to comply.    Mary Is progressing well towards her goals.   Pt prognosis is Good.     Pt will continue to benefit from skilled outpatient physical therapy to address the deficits listed in the problem list box  on initial evaluation, provide pt/family education and to maximize pt's level of independence in the home and community environment.     Pt's spiritual, cultural and educational needs considered and pt agreeable to plan of care and goals.     Anticipated barriers to physical therapy: none     Short Term Goals: 6 weeks   Patient will report improve leakage of urine. Goal met 10/31/2024   Patient will be independent with pelvic floor relaxation to improve bowel and bladder evacuation. Goal not met - progressing   Patient will be able to demonstrate improved pelvic floor relaxation and contraction on rehabilitative ultrasound. Goal not met - progressing  Patient will report pelvic pain of less than 5/10 with vaginal penetration. Goal not met - progressing - was no painful the last time  Patient will report improved water intake and decrease caffeine intake. Goal met 10/31/2024      Long Term Goals: 12 weeks   Patient will report urinating every 3-4 hours with strong urine stream. Goal not met - progressing   Patient will deny fecal incontinence. Goal met 10/31/2024   Patient will report bowel evacuation every 1-2 days. Goal not met - progressing - good bowel movement every week to week and a 1/2  Patient will deny pelvic pain with vaginal penetration. Goal met 10/31/2024   Patient will be compliant with vaginal dilator vs. Pelvic wand use. Goal not met - progressing   Patient will demonstrate adequate pelvic floor coordination with contraction and relaxation on sEMG vs rehabilitative ultrasound.  Goal not met - progressing      Plan     Continue with progressive PF mobility training. Was using vaginal estrogen incorrectly, so ran out prematurely.     Taking Magnesium and fiber. Monitor pain after PF manual therapy. Continue with manual therapy vs. Rehabilitative ultrasound next visit.    Dago Yee, PT

## 2024-11-20 ENCOUNTER — CLINICAL SUPPORT (OUTPATIENT)
Dept: REHABILITATION | Facility: HOSPITAL | Age: 49
End: 2024-11-20
Payer: COMMERCIAL

## 2024-11-20 DIAGNOSIS — R27.8 COORDINATION ABNORMAL: ICD-10-CM

## 2024-11-20 DIAGNOSIS — N94.10 DYSPAREUNIA, FEMALE: ICD-10-CM

## 2024-11-20 DIAGNOSIS — N39.46 MIXED STRESS AND URGE URINARY INCONTINENCE: Primary | ICD-10-CM

## 2024-11-20 DIAGNOSIS — M62.838 MUSCLE SPASM: ICD-10-CM

## 2024-11-20 DIAGNOSIS — M79.18 MYALGIA OF PELVIC FLOOR: ICD-10-CM

## 2024-11-20 PROCEDURE — 97112 NEUROMUSCULAR REEDUCATION: CPT | Mod: PN | Performed by: PHYSICAL THERAPIST

## 2024-11-20 PROCEDURE — 97530 THERAPEUTIC ACTIVITIES: CPT | Mod: PN | Performed by: PHYSICAL THERAPIST

## 2024-11-20 PROCEDURE — 97140 MANUAL THERAPY 1/> REGIONS: CPT | Mod: PN | Performed by: PHYSICAL THERAPIST

## 2024-11-20 NOTE — PROGRESS NOTES
Pelvic Health Physical Therapy   Treatment Note      Name: Leora Steward  [facility] Number: [id number]    Therapy Diagnosis:   Encounter Diagnoses   Name Primary?    Mixed stress and urge urinary incontinence Yes    Coordination abnormal     Myalgia of pelvic floor     Muscle spasm     Dyspareunia, female      Physician: Justin Savage MD    Visit Date: 11/20/2024    Physician Orders: PT Eval and Treat PF PT  Medical Diagnosis from Referral: N39.46 (ICD-10-CM) - Mixed stress and urge urinary incontinence M79.18 (ICD-10-CM) - Myalgia of pelvic floor N94.10 (ICD-10-CM) - Dyspareunia, female  Evaluation Date: 9/25/2024  Authorization Period Expiration: 12/31/2024  Plan of Care Expiration: 12/18/2024  Progress Note Due: 11/30/2024  Visit # 6/12 Visits authorized: 5/20  FOTO: 2/3     Cancelled Visits: 0  No Show Visits: 0    Time In: 7:24 AM   Time Out: 8:15 AM  Total Billable Time: 51 minutes    Precautions: Standard    Subjective     Pt reports: Having surgery on Dec 4th. Bowels are doing ok. Had to use a laxative to assist. Had severe right sided lower abdominal pain that stopped her. She got home and laid down and took 6 laxatives. Still doing magnesium and fiber. May have had a regular bowel movement after manual therapy last visit. Water intake is ok. Not getting enough.       She was compliant with home exercise program.  Response to previous treatment: none   Functional change: none     Pain in:  0/10  Pain out: 0/10  Location: pelvic pain       Objective     Mary participated in dynamic functional therapeutic activities to improve functional performance for 11  minutes, including: Encouraged PF awareness and down training with pelvic floor manual therapy.     Mary received the following manual therapy techniques: to develop flexibility and extensibility for 15 minutes including: trigger point/myofascial release of bilateral bulbocavernosus and bilateral levator ani     Mary participated in neuromuscular  re-education activities to develop Coordination, Down training, and Proprioception for 25 minutes including: pelvic floor downtraining with assist of RUSI , rehabilitative ultrasound imaging to help visualize pelvic floor muscle contraction and relaxation with kegels, and diaphragmatic breathing  Patient with limited pelvic floor mobility. Much improved pelvic floor awareness with rehabilitative ultrasound.           Intervention 10/31/2024  11/07/2024  11/13/2024  11/20/2024    Neuro Re-Ed Rehabilitative ultrasound   pelvic floor relaxation/bulging training and rehabilitative ultrasound imaging to help visualize pelvic floor muscle contraction and relaxation with kegels  Patient responded well to breathing mechanics and PF awareness. Still with limited PF down training.    pelvic floor downtraining with assist of RUSI , rehabilitative ultrasound imaging to help visualize pelvic floor muscle contraction and relaxation with kegels, and diaphragmatic breathing  Patient with limited pelvic floor mobility. Much improved pelvic floor awareness with rehabilitative ultrasound.     Manual Ther Vaginal exam   trigger point/myofascial release of bilateral bulbocavernosus and bilateral levator ani   trigger point/myofascial release of bilateral bulbocavernosus and bilateral levator ani    TherAct  Educated on gastro colic reflex and scar massage to assist with gastric motility.       Educated on meditation and mindfulness to assist with bowel management. Educated on pelvic wands and vaginal dilators.  Encouraged PF awareness and down training with pelvic floor manual therapy.  Encouraged PF awareness and down training with pelvic floor manual therapy.        Home Exercises Provided and Patient Education Provided     Education provided:   - anatomy/physiology of pelvic floor, posture/body mechanices, and diaphragmatic breathing  Discussed progression of plan of care with patient; educated pt in activity modification; reviewed  HEP with pt. Pt demonstrated and verbalized understanding of all instruction and was provided with a handout of HEP (see Patient Instructions).    Written Home Exercises Provided: yes.  Exercises were reviewed and Mary was able to demonstrate them prior to the end of the session.  Mary demonstrated good  understanding of the education provided.     See EMR under Patient Instructions for exercises provided 11/20/2024 .    Assessment     Pt with good understanding of physical therapist recommendations. Willing to comply.    Mary Is progressing well towards her goals.   Pt prognosis is Good.     Pt will continue to benefit from skilled outpatient physical therapy to address the deficits listed in the problem list box on initial evaluation, provide pt/family education and to maximize pt's level of independence in the home and community environment.     Pt's spiritual, cultural and educational needs considered and pt agreeable to plan of care and goals.     Anticipated barriers to physical therapy: none     Short Term Goals: 6 weeks   Patient will report improve leakage of urine. Goal met 10/31/2024   Patient will be independent with pelvic floor relaxation to improve bowel and bladder evacuation. Goal not met - progressing   Patient will be able to demonstrate improved pelvic floor relaxation and contraction on rehabilitative ultrasound. Goal not met - progressing  Patient will report pelvic pain of less than 5/10 with vaginal penetration. Goal not met - progressing - was no painful the last time  Patient will report improved water intake and decrease caffeine intake. Goal met 10/31/2024      Long Term Goals: 12 weeks   Patient will report urinating every 3-4 hours with strong urine stream. Goal not met - progressing   Patient will deny fecal incontinence. Goal met 10/31/2024   Patient will report bowel evacuation every 1-2 days. Goal not met - progressing - good bowel movement every week to week and a 1/2  Patient  will deny pelvic pain with vaginal penetration. Goal met 10/31/2024   Patient will be compliant with vaginal dilator vs. Pelvic wand use. Goal not met - progressing   Patient will demonstrate adequate pelvic floor coordination with contraction and relaxation on sEMG vs rehabilitative ultrasound.  Goal not met - progressing      Plan     Continue with progressive PF mobility training. Was using vaginal estrogen incorrectly, so ran out prematurely.     Taking Magnesium and fiber. Monitor pain after PF manual therapy. Continue with manual therapy vs. Rehabilitative ultrasound next visit. Should be receiving her pelvic wand.     Dago Yee, PT

## 2024-11-27 ENCOUNTER — CLINICAL SUPPORT (OUTPATIENT)
Dept: REHABILITATION | Facility: HOSPITAL | Age: 49
End: 2024-11-27
Payer: COMMERCIAL

## 2024-11-27 DIAGNOSIS — M79.18 MYALGIA OF PELVIC FLOOR: ICD-10-CM

## 2024-11-27 DIAGNOSIS — N39.46 MIXED STRESS AND URGE URINARY INCONTINENCE: Primary | ICD-10-CM

## 2024-11-27 DIAGNOSIS — N94.10 DYSPAREUNIA, FEMALE: ICD-10-CM

## 2024-11-27 DIAGNOSIS — M62.838 MUSCLE SPASM: ICD-10-CM

## 2024-11-27 DIAGNOSIS — R27.8 COORDINATION ABNORMAL: ICD-10-CM

## 2024-11-27 PROCEDURE — 97530 THERAPEUTIC ACTIVITIES: CPT | Mod: PN | Performed by: PHYSICAL THERAPIST

## 2024-11-27 PROCEDURE — 97112 NEUROMUSCULAR REEDUCATION: CPT | Mod: PN | Performed by: PHYSICAL THERAPIST

## 2024-11-27 PROCEDURE — 97140 MANUAL THERAPY 1/> REGIONS: CPT | Mod: PN | Performed by: PHYSICAL THERAPIST

## 2024-11-27 NOTE — PROGRESS NOTES
Pelvic Health Physical Therapy   Treatment Note      Name: Leora Steward  Clinic Number: [id number]    Therapy Diagnosis:   Encounter Diagnoses   Name Primary?    Mixed stress and urge urinary incontinence Yes    Coordination abnormal     Myalgia of pelvic floor     Muscle spasm     Dyspareunia, female      Physician: Justin Savage MD    Visit Date: 11/27/2024    Physician Orders: PT Eval and Treat PF PT  Medical Diagnosis from Referral: N39.46 (ICD-10-CM) - Mixed stress and urge urinary incontinence M79.18 (ICD-10-CM) - Myalgia of pelvic floor N94.10 (ICD-10-CM) - Dyspareunia, female  Evaluation Date: 9/25/2024  Authorization Period Expiration: 12/31/2024  Plan of Care Expiration: 12/18/2024  Progress Note Due: 11/30/2024  Visit # 7/12 Visits authorized: 6/20  FOTO: 2/3     Cancelled Visits: 0  No Show Visits: 0    Time In: 7:15 AM   Time Out: 8:15 AM  Total Billable Time: 60  minutes    Precautions: Standard    Subjective     Pt reports: Had a good bowel movement yesterday and the day before medication assistance. Waiting on her pelvic wand to come. Abdominal pain has improved since bowel movements have improved.     She was compliant with home exercise program.  Response to previous treatment: none   Functional change: none     Pain in:  0/10  Pain out: 0/10  Location: pelvic pain       Objective     Mary participated in dynamic functional therapeutic activities to improve functional performance for 30 minutes, including: Encouraged PF awareness and down training with pelvic floor manual therapy. Educated on splinting to assist with bowel evacuation. Educated on the mechanics of a rectocele.     Mary received the following manual therapy techniques: to develop flexibility and extensibility for 15 minutes including: trigger point/myofascial release of bilateral bulbocavernosus and bilateral levator ani  , external bulbocavernosus approximation    Mary participated in neuromuscular re-education activities  to develop Coordination, Down training, and Proprioception for 15 minutes including: pelvic floor downtraining with assist of RUSI , rehabilitative ultrasound imaging to help visualize pelvic floor muscle contraction and relaxation with kegels, and diaphragmatic breathing  Patient with limited pelvic floor mobility with reflexive PF contraction with attempts to relax.        Intervention 11/13/2024 11/20/2024 11/27/2024   Neuro Re-Ed Rehabilitative ultrasound   pelvic floor downtraining with assist of RUSI , rehabilitative ultrasound imaging to help visualize pelvic floor muscle contraction and relaxation with kegels, and diaphragmatic breathing  Patient with limited pelvic floor mobility. Much improved pelvic floor awareness with rehabilitative ultrasound.   pelvic floor downtraining with assist of RUSI , rehabilitative ultrasound imaging to help visualize pelvic floor muscle contraction and relaxation with kegels, and diaphragmatic breathing  Patient with limited pelvic floor mobility with reflexive PF contraction with attempts to relax.     Manual Ther Vaginal exam trigger point/myofascial release of bilateral bulbocavernosus and bilateral levator ani   trigger point/myofascial release of bilateral bulbocavernosus and bilateral levator ani  trigger point/myofascial release of bilateral bulbocavernosus and bilateral levator ani , external bulbocavernosus approximation   TherAct  Encouraged PF awareness and down training with pelvic floor manual therapy.  Encouraged PF awareness and down training with pelvic floor manual therapy.  Encouraged PF awareness and down training with pelvic floor manual therapy. Educated on splinting to assist with bowel evacuation. Educated on the mechanics of a rectocele.        Home Exercises Provided and Patient Education Provided     Education provided:   - anatomy/physiology of pelvic floor, posture/body mechanices, and diaphragmatic breathing  Discussed progression of plan of  care with patient; educated pt in activity modification; reviewed HEP with pt. Pt demonstrated and verbalized understanding of all instruction and was provided with a handout of HEP (see Patient Instructions).    Written Home Exercises Provided: yes.  Exercises were reviewed and Mary was able to demonstrate them prior to the end of the session.  Mary demonstrated good  understanding of the education provided.     See EMR under Patient Instructions for exercises provided 11/27/2024 .    Assessment     Pt with good understanding of physical therapist recommendations. Willing to comply. Complaints of POP when sitting to have a bowel movement.     Mary Is progressing well towards her goals.   Pt prognosis is Good.     Pt will continue to benefit from skilled outpatient physical therapy to address the deficits listed in the problem list box on initial evaluation, provide pt/family education and to maximize pt's level of independence in the home and community environment.     Pt's spiritual, cultural and educational needs considered and pt agreeable to plan of care and goals.     Anticipated barriers to physical therapy: none     Short Term Goals: 6 weeks   Patient will report improve leakage of urine. Goal met 10/31/2024   Patient will be independent with pelvic floor relaxation to improve bowel and bladder evacuation. Goal not met - progressing   Patient will be able to demonstrate improved pelvic floor relaxation and contraction on rehabilitative ultrasound. Goal not met - progressing  Patient will report pelvic pain of less than 5/10 with vaginal penetration. Goal not met - progressing - was no painful the last time  Patient will report improved water intake and decrease caffeine intake. Goal met 10/31/2024      Long Term Goals: 12 weeks   Patient will report urinating every 3-4 hours with strong urine stream. Goal not met - progressing   Patient will deny fecal incontinence. Goal met 10/31/2024   Patient will  report bowel evacuation every 1-2 days. Goal not met - progressing - good bowel movement every week to week and a 1/2  Patient will deny pelvic pain with vaginal penetration. Goal met 10/31/2024   Patient will be compliant with vaginal dilator vs. Pelvic wand use. Goal not met - progressing   Patient will demonstrate adequate pelvic floor coordination with contraction and relaxation on sEMG vs rehabilitative ultrasound.  Goal not met - progressing      Plan     Continue with progressive PF mobility training. Rehabilitative ultrasound and pelvic exam in standing.     Taking Magnesium and fiber. Monitor pain after PF manual therapy. Continue with manual therapy vs. Rehabilitative ultrasound next visit. Should be receiving her pelvic wand.     Dago Yee, PT

## 2024-12-03 ENCOUNTER — PATIENT MESSAGE (OUTPATIENT)
Dept: BARIATRICS | Facility: CLINIC | Age: 49
End: 2024-12-03
Payer: COMMERCIAL

## 2024-12-10 ENCOUNTER — PATIENT MESSAGE (OUTPATIENT)
Dept: BARIATRICS | Facility: CLINIC | Age: 49
End: 2024-12-10
Payer: COMMERCIAL

## 2024-12-11 ENCOUNTER — CLINICAL SUPPORT (OUTPATIENT)
Dept: REHABILITATION | Facility: HOSPITAL | Age: 49
End: 2024-12-11
Payer: COMMERCIAL

## 2024-12-11 DIAGNOSIS — N94.10 DYSPAREUNIA, FEMALE: ICD-10-CM

## 2024-12-11 DIAGNOSIS — N39.46 MIXED STRESS AND URGE URINARY INCONTINENCE: Primary | ICD-10-CM

## 2024-12-11 DIAGNOSIS — M79.18 MYALGIA OF PELVIC FLOOR: ICD-10-CM

## 2024-12-11 DIAGNOSIS — R27.8 COORDINATION ABNORMAL: ICD-10-CM

## 2024-12-11 DIAGNOSIS — M62.838 MUSCLE SPASM: ICD-10-CM

## 2024-12-11 PROCEDURE — 97530 THERAPEUTIC ACTIVITIES: CPT | Mod: PN | Performed by: PHYSICAL THERAPIST

## 2024-12-11 PROCEDURE — 97140 MANUAL THERAPY 1/> REGIONS: CPT | Mod: PN | Performed by: PHYSICAL THERAPIST

## 2024-12-11 PROCEDURE — 97112 NEUROMUSCULAR REEDUCATION: CPT | Mod: PN | Performed by: PHYSICAL THERAPIST

## 2024-12-11 NOTE — PATIENT INSTRUCTIONS
"With coughing, lifting, sneezing, pulling, tugging..  - keep your box shut with the activity- core and pelvic floor contraction     [email or web address] "splinting tool"     "

## 2024-12-11 NOTE — PROGRESS NOTES
Pelvic Health Physical Therapy   Treatment Note and Progress Note     Name: Leora Steward  Clinic Number: [id number]    Therapy Diagnosis:   Encounter Diagnoses   Name Primary?    Mixed stress and urge urinary incontinence Yes    Coordination abnormal     Muscle spasm     Dyspareunia, female     Myalgia of pelvic floor      Physician: Justin Savage MD    Visit Date: 12/11/2024    Physician Orders: PT Eval and Treat PF PT  Medical Diagnosis from Referral: N39.46 (ICD-10-CM) - Mixed stress and urge urinary incontinence M79.18 (ICD-10-CM) - Myalgia of pelvic floor N94.10 (ICD-10-CM) - Dyspareunia, female  Evaluation Date: 9/25/2024  Authorization Period Expiration: 12/31/2024  Plan of Care Expiration: 12/18/2024  Progress Note Due: 1/11/2025  Visit # 8/12 Visits authorized: 7/20  FOTO: 3/3     Cancelled Visits: 0  No Show Visits: 0    Time In: 7:25 AM   Time Out: 8:25 AM   Total Billable Time: 60 minutes    Precautions: Standard    Subjective     Pt reports: Still feeling POP with bowel movement. Has not tried her pelvic wand. Bowel movements were easier after last visit. [place] was not painful. Abdominal pain is doing better. Feels like it is related to gas since her gastric bypass. Sees. Dr. Savage in January.     She was compliant with home exercise program.  Response to previous treatment: none   Functional change: none     Pain in:  0/10  Pain out: 0/10  Location: pelvic pain       Objective     Mary participated in dynamic functional therapeutic activities to improve functional performance for 15 minutes, including: Educated on splinting to assist with bowel evacuation. May also benefit from splinting tool to assist. Educated on pelvic pressure to assist vs intra vaginal assist.  Educated on use of pelvic wand for improved pelvic floor mobility.     Mary received the following manual therapy techniques: to develop flexibility and extensibility for 15 minutes including: trigger point/myofascial release  of bilateral bulbocavernosus and bilateral levator ani  , external bulbocavernosus approximation, rectum is full, improved pelvic floor mobility with manual therapy    Mary participated in neuromuscular re-education activities to develop Coordination, Down training, and Proprioception for 30 minutes including: pelvic floor downtraining with assist of RUSI , rehabilitative ultrasound imaging to help visualize pelvic floor muscle contraction and relaxation with kegels, and diaphragmatic breathing in supine and standing.  Posterior POP was slightly worse in standing.       Intervention 11/13/2024 11/20/2024 11/27/2024 12/11/2024    Neuro Re-Ed Rehabilitative ultrasound   pelvic floor downtraining with assist of RUSI , rehabilitative ultrasound imaging to help visualize pelvic floor muscle contraction and relaxation with kegels, and diaphragmatic breathing  Patient with limited pelvic floor mobility. Much improved pelvic floor awareness with rehabilitative ultrasound.   pelvic floor downtraining with assist of RUSI , rehabilitative ultrasound imaging to help visualize pelvic floor muscle contraction and relaxation with kegels, and diaphragmatic breathing  Patient with limited pelvic floor mobility with reflexive PF contraction with attempts to relax.      Manual Ther Vaginal exam trigger point/myofascial release of bilateral bulbocavernosus and bilateral levator ani   trigger point/myofascial release of bilateral bulbocavernosus and bilateral levator ani  trigger point/myofascial release of bilateral bulbocavernosus and bilateral levator ani , external bulbocavernosus approximation    TherAct  Encouraged PF awareness and down training with pelvic floor manual therapy.  Encouraged PF awareness and down training with pelvic floor manual therapy.  Encouraged PF awareness and down training with pelvic floor manual therapy. Educated on splinting to assist with bowel evacuation. Educated on the mechanics of a rectocele.          Home Exercises Provided and Patient Education Provided     Education provided:   - anatomy/physiology of pelvic floor, posture/body mechanices, and diaphragmatic breathing  Discussed progression of plan of care with patient; educated pt in activity modification; reviewed HEP with pt. Pt demonstrated and verbalized understanding of all instruction and was provided with a handout of HEP (see Patient Instructions).    Written Home Exercises Provided: yes.  Exercises were reviewed and Mary was able to demonstrate them prior to the end of the session.  Mary demonstrated good  understanding of the education provided.     See EMR under Patient Instructions for exercises provided 12/11/2024 .    Assessment     Pt with good understanding of physical therapist recommendations. Willing to comply. Complaints of POP when sitting to have a bowel movement.     Mary Is progressing well towards her goals.   Pt prognosis is Good.     Pt will continue to benefit from skilled outpatient physical therapy to address the deficits listed in the problem list box on initial evaluation, provide pt/family education and to maximize pt's level of independence in the home and community environment.     Pt's spiritual, cultural and educational needs considered and pt agreeable to plan of care and goals.     Anticipated barriers to physical therapy: none     Short Term Goals: 6 weeks   Patient will report improve leakage of urine. Goal met 10/31/2024 - worse with recent coughing  Patient will be independent with pelvic floor relaxation to improve bowel and bladder evacuation. Goal not met - progressing   Patient will be able to demonstrate improved pelvic floor relaxation and contraction on rehabilitative ultrasound. Goal not met - progressing  Patient will report pelvic pain of less than 5/10 with vaginal penetration. Goal met - 12/11/2024   Patient will report improved water intake and decrease caffeine intake. Goal met 10/31/2024       Long Term Goals: 12 weeks   Patient will report urinating every 3-4 hours with strong urine stream. Goal not met - progressing   Patient will deny fecal incontinence. Goal met 10/31/2024   Patient will report bowel evacuation every 1-2 days. Goal not met - progressing - good bowel movement every week to week and a 1/2  Patient will deny pelvic pain with vaginal penetration. Goal met 10/31/2024   Patient will be compliant with vaginal dilator vs. Pelvic wand use. Goal not met - progressing   Patient will demonstrate adequate pelvic floor coordination with contraction and relaxation on sEMG vs rehabilitative ultrasound.  Goal not met - progressing      Plan     Continue with progressive PF mobility training. Did rehabilitative ultrasound and pelvic exam in standing.     Taking Magnesium and fiber. Monitor pain after PF manual therapy. Continue with manual therapy vs. Rehabilitative ultrasound next visit. Should start using her pelvic wand.     Dago Yee, PT

## 2024-12-18 ENCOUNTER — CLINICAL SUPPORT (OUTPATIENT)
Dept: REHABILITATION | Facility: HOSPITAL | Age: 49
End: 2024-12-18
Payer: COMMERCIAL

## 2024-12-18 DIAGNOSIS — N94.10 DYSPAREUNIA, FEMALE: ICD-10-CM

## 2024-12-18 DIAGNOSIS — M62.838 MUSCLE SPASM: ICD-10-CM

## 2024-12-18 DIAGNOSIS — N39.46 MIXED STRESS AND URGE URINARY INCONTINENCE: Primary | ICD-10-CM

## 2024-12-18 DIAGNOSIS — R27.8 COORDINATION ABNORMAL: ICD-10-CM

## 2024-12-18 DIAGNOSIS — M79.18 MYALGIA OF PELVIC FLOOR: ICD-10-CM

## 2024-12-18 PROCEDURE — 97530 THERAPEUTIC ACTIVITIES: CPT | Mod: PN | Performed by: PHYSICAL THERAPIST

## 2024-12-18 NOTE — PROGRESS NOTES
Pelvic Health Physical Therapy   Treatment Note      Name: Leora Steward  Clinic Number: [id number]    Therapy Diagnosis:   Encounter Diagnoses   Name Primary?    Mixed stress and urge urinary incontinence Yes    Muscle spasm     Coordination abnormal     Dyspareunia, female     Myalgia of pelvic floor      Physician: Justin Savage MD    Visit Date: 12/18/2024    Physician Orders: PT Eval and Treat PF PT  Medical Diagnosis from Referral: N39.46 (ICD-10-CM) - Mixed stress and urge urinary incontinence M79.18 (ICD-10-CM) - Myalgia of pelvic floor N94.10 (ICD-10-CM) - Dyspareunia, female  Evaluation Date: 9/25/2024  Authorization Period Expiration: 12/31/2024  Plan of Care Expiration: 12/18/2024  Progress Note Due: 1/11/2025  Visit # 9/12 Visits authorized: 8/20  FOTO: 3/3     Cancelled Visits: 0  No Show Visits: 0    Time In: 7:25 AM   Time Out: 8:00 AM  Total Billable Time: 35 minutes    Precautions: Standard    Subjective     Pt reports: Feeling good. Had a really good bowel movement Sunday night. She was snacking all day long.     She was compliant with home exercise program.  Response to previous treatment: none   Functional change: none     Pain in:  0/10  Pain out: 0/10  Location: pelvic pain       Objective     Mary participated in dynamic functional therapeutic activities to improve functional performance for 35 minutes, including: Educated continued bowel management. Encouraged body awareness and stress management.         Intervention 11/13/2024 11/20/2024 11/27/2024 12/11/2024 12/18/2024    Neuro Re-Ed Rehabilitative ultrasound   pelvic floor downtraining with assist of RUSI , rehabilitative ultrasound imaging to help visualize pelvic floor muscle contraction and relaxation with kegels, and diaphragmatic breathing  Patient with limited pelvic floor mobility. Much improved pelvic floor awareness with rehabilitative ultrasound.   pelvic floor downtraining with assist of RUSI , rehabilitative  ultrasound imaging to help visualize pelvic floor muscle contraction and relaxation with kegels, and diaphragmatic breathing  Patient with limited pelvic floor mobility with reflexive PF contraction with attempts to relax.   pelvic floor downtraining with assist of RUSI , rehabilitative ultrasound imaging to help visualize pelvic floor muscle contraction and relaxation with kegels, and diaphragmatic breathing in supine and standing.  Posterior POP was slightly worse in standing.     Manual Ther Vaginal exam trigger point/myofascial release of bilateral bulbocavernosus and bilateral levator ani   trigger point/myofascial release of bilateral bulbocavernosus and bilateral levator ani  trigger point/myofascial release of bilateral bulbocavernosus and bilateral levator ani , external bulbocavernosus approximation  trigger point/myofascial release of bilateral bulbocavernosus and bilateral levator ani , external bulbocavernosus approximation, rectum is full, improved pelvic floor mobility with manual therapy      TherAct  Encouraged PF awareness and down training with pelvic floor manual therapy.  Encouraged PF awareness and down training with pelvic floor manual therapy.  Encouraged PF awareness and down training with pelvic floor manual therapy. Educated on splinting to assist with bowel evacuation. Educated on the mechanics of a rectocele.  Educated on splinting to assist with bowel evacuation. May also benefit from splinting tool to assist. Educated on pelvic pressure to assist vs intra vaginal assist.  Educated on use of pelvic wand for improved pelvic floor mobility.    Educated continued bowel management. Encouraged body awareness and stress management.        Home Exercises Provided and Patient Education Provided     Education provided:   - anatomy/physiology of pelvic floor, posture/body mechanices, and diaphragmatic breathing  Discussed progression of plan of care with patient; educated pt in activity  modification; reviewed HEP with pt. Pt demonstrated and verbalized understanding of all instruction and was provided with a handout of HEP (see Patient Instructions).    Written Home Exercises Provided: yes.  Exercises were reviewed and Mary was able to demonstrate them prior to the end of the session.  Mary demonstrated good  understanding of the education provided.     See EMR under Patient Instructions for exercises provided 12/18/2024 .    Assessment     Pt with good understanding of physical therapist recommendations. Willing to comply. Improved bowel management.   Mary Is progressing well towards her goals.   Pt prognosis is Good.     Pt will continue to benefit from skilled outpatient physical therapy to address the deficits listed in the problem list box on initial evaluation, provide pt/family education and to maximize pt's level of independence in the home and community environment.     Pt's spiritual, cultural and educational needs considered and pt agreeable to plan of care and goals.     Anticipated barriers to physical therapy: none     Short Term Goals: 6 weeks   Patient will report improve leakage of urine. Goal met 10/31/2024 - worse with recent coughing  Patient will be independent with pelvic floor relaxation to improve bowel and bladder evacuation. Goal not met - progressing   Patient will be able to demonstrate improved pelvic floor relaxation and contraction on rehabilitative ultrasound. Goal not met - progressing  Patient will report pelvic pain of less than 5/10 with vaginal penetration. Goal met - 12/11/2024   Patient will report improved water intake and decrease caffeine intake. Goal met 10/31/2024      Long Term Goals: 12 weeks   Patient will report urinating every 3-4 hours with strong urine stream. Goal not met - progressing   Patient will deny fecal incontinence. Goal met 10/31/2024   Patient will report bowel evacuation every 1-2 days. Goal not met - progressing - good bowel  movement every week to week and a 1/2  Patient will deny pelvic pain with vaginal penetration. Goal met 10/31/2024   Patient will be compliant with vaginal dilator vs. Pelvic wand use. Goal not met - progressing   Patient will demonstrate adequate pelvic floor coordination with contraction and relaxation on sEMG vs rehabilitative ultrasound.  Goal not met - progressing      Plan     Continue with progressive PF mobility training. Did rehabilitative ultrasound and pelvic exam in standing.     Taking Magnesium and fiber. Monitor pain after PF manual therapy. Continue with manual therapy vs. Rehabilitative ultrasound next visit. Should start using her pelvic wand.     Dago Yee, PT

## 2025-01-02 ENCOUNTER — CLINICAL SUPPORT (OUTPATIENT)
Dept: REHABILITATION | Facility: HOSPITAL | Age: 50
End: 2025-01-02
Payer: COMMERCIAL

## 2025-01-02 DIAGNOSIS — R27.8 COORDINATION ABNORMAL: ICD-10-CM

## 2025-01-02 DIAGNOSIS — M62.838 MUSCLE SPASM: ICD-10-CM

## 2025-01-02 DIAGNOSIS — N39.46 MIXED STRESS AND URGE URINARY INCONTINENCE: Primary | ICD-10-CM

## 2025-01-02 DIAGNOSIS — M79.18 MYALGIA OF PELVIC FLOOR: ICD-10-CM

## 2025-01-02 DIAGNOSIS — N94.10 DYSPAREUNIA, FEMALE: ICD-10-CM

## 2025-01-02 PROCEDURE — 97112 NEUROMUSCULAR REEDUCATION: CPT | Mod: PN | Performed by: PHYSICAL THERAPIST

## 2025-01-02 PROCEDURE — 97530 THERAPEUTIC ACTIVITIES: CPT | Mod: PN | Performed by: PHYSICAL THERAPIST

## 2025-01-02 NOTE — PROGRESS NOTES
Pelvic Health Physical Therapy   Treatment Note and Progress Note     Name: Leora Steward  Clinic Number: [id number]    Therapy Diagnosis:   Encounter Diagnoses   Name Primary?    Mixed stress and urge urinary incontinence Yes    Muscle spasm     Coordination abnormal     Dyspareunia, female     Myalgia of pelvic floor      Physician: Justin Savage MD    Visit Date: 1/2/2025    Physician Orders: PT Eval and Treat PF PT  Medical Diagnosis from Referral: N39.46 (ICD-10-CM) - Mixed stress and urge urinary incontinence M79.18 (ICD-10-CM) - Myalgia of pelvic floor N94.10 (ICD-10-CM) - Dyspareunia, female  Evaluation Date: 9/25/2024  Authorization Period Expiration: 12/31/2024  Plan of Care Expiration: 12/18/2024  Progress Note Due: 1/2/2025  Visit # 10/12 Visits authorized: 1/20  FOTO: 3/3     Cancelled Visits: 0  No Show Visits: 0    Time In: 7:16 AM   Time Out: 7:56 AM   Total Billable Time: 40 minutes    Precautions: Standard    Subjective     Pt reports: Bowels are moving. Denies pain. She has been using her pelvic wand every other day or 2. Denies pain with intercourse. More aware of PF down training. Used her finger to splint and it helped - plans on getting the splinting tool. Seeing Dr. Savage 1-22-25 - will determine the need for continued physical therapy at that time.     She was compliant with home exercise program.  Response to previous treatment: none   Functional change: none     Pain in:  0/10  Pain out: 0/10  Location: pelvic pain       Objective     Mary participated in dynamic functional therapeutic activities to improve functional performance for 10 minutes, including: Educated on use of pelvic wand to assist with pelvic floor tension. Continue splinting PRN. Discuss further interventions with Dr. Savage.     Mary participated in neuromuscular re-education activities to develop Coordination, Down training, and Proprioception for 30 minutes including: rehabilitative ultrasound imaging to help  visualize pelvic floor muscle contraction and relaxation with kegels  PF coordination training. Moderate cues for pelvic floor down training.          Intervention 11/27/2024 12/11/2024 12/18/2024 01/02/2025    Neuro Re-Ed Rehabilitative ultrasound  pelvic floor downtraining with assist of RUSI , rehabilitative ultrasound imaging to help visualize pelvic floor muscle contraction and relaxation with kegels, and diaphragmatic breathing  Patient with limited pelvic floor mobility with reflexive PF contraction with attempts to relax.   pelvic floor downtraining with assist of RUSI , rehabilitative ultrasound imaging to help visualize pelvic floor muscle contraction and relaxation with kegels, and diaphragmatic breathing in supine and standing.  Posterior POP was slightly worse in standing.   rehabilitative ultrasound imaging to help visualize pelvic floor muscle contraction and relaxation with kegels  PF coordination training. Moderate cues for pelvic floor down training.     Manual Ther Vaginal exam trigger point/myofascial release of bilateral bulbocavernosus and bilateral levator ani , external bulbocavernosus approximation  trigger point/myofascial release of bilateral bulbocavernosus and bilateral levator ani , external bulbocavernosus approximation, rectum is full, improved pelvic floor mobility with manual therapy       TherAct  Encouraged PF awareness and down training with pelvic floor manual therapy. Educated on splinting to assist with bowel evacuation. Educated on the mechanics of a rectocele.  Educated on splinting to assist with bowel evacuation. May also benefit from splinting tool to assist. Educated on pelvic pressure to assist vs intra vaginal assist.  Educated on use of pelvic wand for improved pelvic floor mobility.    Educated continued bowel management. Encouraged body awareness and stress management.  Educated on use of pelvic wand to assist with pelvic floor tension. Continue splinting PRN.  Discuss further interventions with Dr. Savage.        Home Exercises Provided and Patient Education Provided     Education provided:   - anatomy/physiology of pelvic floor, posture/body mechanices, and diaphragmatic breathing  Discussed progression of plan of care with patient; educated pt in activity modification; reviewed HEP with pt. Pt demonstrated and verbalized understanding of all instruction and was provided with a handout of HEP (see Patient Instructions).    Written Home Exercises Provided: yes.  Exercises were reviewed and Mary was able to demonstrate them prior to the end of the session.  Mary demonstrated good  understanding of the education provided.     See EMR under Patient Instructions for exercises provided 01/02/2025 .    Assessment     Pt with good understanding of physical therapist recommendations. Willing to comply. Improved bowel management. Pain with intercourse has improved.     Mary Is progressing well towards her goals.   Pt prognosis is Good.     Pt will continue to benefit from skilled outpatient physical therapy to address the deficits listed in the problem list box on initial evaluation, provide pt/family education and to maximize pt's level of independence in the home and community environment.     Pt's spiritual, cultural and educational needs considered and pt agreeable to plan of care and goals.     Anticipated barriers to physical therapy: none     Short Term Goals: 6 weeks   Patient will report improve leakage of urine. Goal met 10/31/2024 - worse with recent coughing  Patient will be independent with pelvic floor relaxation to improve bowel and bladder evacuation. Goal met - 01/02/2025   Patient will be able to demonstrate improved pelvic floor relaxation and contraction on rehabilitative ultrasound. Goal not met - progressing  Patient will report pelvic pain of less than 5/10 with vaginal penetration. Goal met - 12/11/2024   Patient will report improved water intake and  decrease caffeine intake. Goal met 10/31/2024      Long Term Goals: 12 weeks   Patient will report urinating every 3-4 hours with strong urine stream. Goal met - 01/02/2025   Patient will deny fecal incontinence. Goal met 10/31/2024   Patient will report bowel evacuation every 1-2 days. Goal met - 01/02/2025 - moves every 2 days  Patient will deny pelvic pain with vaginal penetration. Goal met 10/31/2024   Patient will be compliant with vaginal dilator vs. Pelvic wand use. Goal met - 01/02/2025  Patient will demonstrate adequate pelvic floor coordination with contraction and relaxation on sEMG vs rehabilitative ultrasound.  Goal not met - progressing      Plan     Continue with progressive PF mobility training. Did rehabilitative ultrasound and pelvic exam in standing.     Taking Magnesium and fiber.  Continue with manual therapy vs. Rehabilitative ultrasound next visit. Has been using her pelvic wand.     Dago Yee, PT

## 2025-01-06 ENCOUNTER — PATIENT MESSAGE (OUTPATIENT)
Dept: BARIATRICS | Facility: CLINIC | Age: 50
End: 2025-01-06
Payer: COMMERCIAL

## 2025-01-14 ENCOUNTER — PATIENT MESSAGE (OUTPATIENT)
Dept: BARIATRICS | Facility: CLINIC | Age: 50
End: 2025-01-14
Payer: COMMERCIAL

## 2025-01-22 ENCOUNTER — OFFICE VISIT (OUTPATIENT)
Dept: UROGYNECOLOGY | Facility: CLINIC | Age: 50
End: 2025-01-22
Payer: COMMERCIAL

## 2025-01-22 ENCOUNTER — PATIENT MESSAGE (OUTPATIENT)
Dept: UROGYNECOLOGY | Facility: CLINIC | Age: 50
End: 2025-01-22

## 2025-01-22 DIAGNOSIS — Z01.419 WELL WOMAN EXAM: Primary | ICD-10-CM

## 2025-01-22 DIAGNOSIS — N39.46 MIXED STRESS AND URGE URINARY INCONTINENCE: ICD-10-CM

## 2025-01-22 DIAGNOSIS — K59.04 CHRONIC IDIOPATHIC CONSTIPATION: ICD-10-CM

## 2025-01-22 DIAGNOSIS — N81.6 CYSTOCELE WITH RECTOCELE: ICD-10-CM

## 2025-01-22 DIAGNOSIS — N94.10 DYSPAREUNIA, FEMALE: ICD-10-CM

## 2025-01-22 DIAGNOSIS — N95.2 VAGINAL ATROPHY: ICD-10-CM

## 2025-01-22 DIAGNOSIS — N81.10 CYSTOCELE WITH RECTOCELE: ICD-10-CM

## 2025-01-22 DIAGNOSIS — E89.40 SURGICAL MENOPAUSE: ICD-10-CM

## 2025-01-22 DIAGNOSIS — E11.9 TYPE 2 DIABETES MELLITUS WITHOUT COMPLICATION, WITHOUT LONG-TERM CURRENT USE OF INSULIN: ICD-10-CM

## 2025-01-22 DIAGNOSIS — M79.18 MYALGIA OF PELVIC FLOOR: ICD-10-CM

## 2025-01-22 PROCEDURE — 98005 SYNCH AUDIO-VIDEO EST LOW 20: CPT | Mod: 95,,, | Performed by: OBSTETRICS & GYNECOLOGY

## 2025-01-22 NOTE — PROGRESS NOTES
The patient location is: home  The chief complaint leading to consultation is: follow up    Visit type: audio only.  No internet with weather.     Face to Face time with patient: 20 min  20 minutes of total time spent on the encounter, which includes face to face time and non-face to face time preparing to see the patient (eg, review of tests), Obtaining and/or reviewing separately obtained history, Documenting clinical information in the electronic or other health record, Independently interpreting results (not separately reported) and communicating results to the patient/family/caregiver, or Care coordination (not separately reported).     Each patient to whom he or she provides medical services by telemedicine is:  (1) informed of the relationship between the physician and patient and the respective role of any other health care provider with respect to management of the patient; and (2) notified that he or she may decline to receive medical services by telemedicine and may withdraw from such care at any time.    Notes:     HPI: The patient's last visit with me was on 9/19/2024.    1)  UI:  (+) PANCHO < (+) UUI  X years. Sometimes PV dribbling.  (--) pads.  Changes underwear 1x/week.  Daytime frequency: Q 6+hours--can hold all day.  Nocturia: No.  (--) dysuria,  (--) hematuria,  (--) frequent UTIs.  (+) complete bladder emptying.     2)  POP:  Absent.  (--) vaginal bleeding. (--) vaginal discharge. (+) sexually active.  (+) dyspareunia. Deep penetration and after partner ejaculates--feels like stabbing pain--has to shower to calm down.  Has been happening x 1 year, same partner.  (+)  Vaginal dryness.  (--) vaginal estrogen use.   --POP-Q:  Aa -2; Ba -2; C -8; Ap -1; Bp -1.  Genital hiatus 4, perineal body 3. total vaginal length 9.    3)  BM:  (+) constipation/straining. Taking fiber daily, which has mostly controlled issue.  2 scoops QAM and 2 scoops QPM. Trying to drink enough water. Did have gastric bypass. Uses  enema every 1-2 weeks.  (--) chronic diarrhea. (--) hematochezia.  (--) fecal incontinence.  (--) fecal smearing/urgency.  (+) complete evacuation.  Does disimpact rectum manually once every 1-2 weeks if stool is hard. No vaginal splinting.   4/2024 MR goodson:  Impression:  1.    Anatomic findings: Post hysterectomy surgical changes  2.    Anterior compartment findings: 2.5 cm grade 1 cystocele  3.    Middle compartment findings: Vaginal prolapse  4.    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  History of abnormal paps: No.  History of STIs: yes-unknown-treated in 20s  Mammogram: overdue--ordered, please schedule  Colonoscopy: Date of last: 6/2024.  Result:  polyp, suboptimal prep.  Repeat due:  2025.    DEXA:  none--ordered, please schedule; h/o tob use/early surgical menopause/gastric bypass    2)  Chronic constipation:  --MR goodson 4/2024: grade 1-2 cystocele/rectocele   --exam today: very mild, distal pocketing on exam   --doesn't seem clinically significant   --rectocele does not cause constipation  --anal manometry 5/2024: Findings consistent with Type II dyssynergic  Defecation  --improved but does have to splint at times if stool is hard   --continues to take fiber daily (2T)  --continues magnesium 2x/week  --did PT    3)  Pain with intercourse (biggest concern today):  --mostly triggered by levator muscles on exam  --finished PT--able to have intercourse without pain  --using vaginal estrogen 2x/week     4)  Mixed urinary incontinence, urge > stress:    --Baseline: (+) PANCHO < (+) UUI  X years. Sometimes PV dribbling.  (--) pads.   Changes underwear 1x/week.  Daytime frequency: Q 6+hours--can hold all day.  Nocturia: No.    --currently:  trying to void more frequently, Q3h.  Not having to change underwear as much.  Most issue is 1st AM.      Past Medical History  Past Medical History:   Diagnosis Date    Anxiety     Arthritis     COPD (chronic obstructive pulmonary disease)     Depression     Diabetes mellitus     Hypertension     Neuromuscular disorder    --COPD: s/p TOB use; currently no smoking/vaping since 2013; using inhalers PRN; no home O2  --DM:  had before gastric bypass--resolved now  Lab Results   Component Value Date    HGBA1C 5.5 02/20/2023   --depression: controlled currently    Past Surgical History  Past Surgical History:   Procedure Laterality Date    ANORECTAL MANOMETRY N/A 5/1/2024    Procedure: MANOMETRY, ANORECTAL;  Surgeon: Austin Stewart MD;  Location: [facility] (4TH FLR);  Service: Endoscopy;  Laterality: N/A;  Ref By: ,instr sent via portal,traveling enema at check in.  4/25-instructions resent to [facility], precall complete, pt knows no ride needed-Kpvt    CHOLECYSTECTOMY      COLONOSCOPY N/A 6/28/2024    Procedure: COLONOSCOPY;  Surgeon: Corey Howard MD;  Location: [facility] (4TH FLR);  Service: Endoscopy;  Laterality: N/A;  6/14/24- case length adjusted - ERW    ESOPHAGOGASTRODUODENOSCOPY N/A 6/28/2024    Procedure: EGD (ESOPHAGOGASTRODUODENOSCOPY);  Surgeon: Corey Howard MD;  Location: [facility] (4TH FLR);  Service: Endoscopy;  Laterality: N/A;  Ref By: ,instr sent via portal,[facility].  pre call complete 6/21, instructions reviewed -     HEMORRHOID SURGERY      HYSTERECTOMY      full    LAPAROSCOPIC GASTROENTEROSTOMY N/A 10/16/2019    Procedure: GASTROENTEROSTOMY, LAPAROSCOPIC, with intraop EGD;  Surgeon: Edmundo Ulrich MD;  [address]  Service: General;  Laterality: N/A;    PANNICULECTOMY N/A 03/07/2023    Procedure: PANNICULECTOMY;  Surgeon:  Cabrera Bardales MD;  Location: [facility] OR [address] [place] [address];  Service: Plastics;  Laterality: N/A;    TUBAL LIGATION     --LSC gloria  --hemorrhoidectomy  --LSC BTL     Hysterectomy: Yes   Date: 30s.  Indication: endometriosis/painful menses.    Type: laparoscopic  Cervix present: No  Ovaries present: No. Was on HRT--stopped years ago due to intolerance (emotional)  Other procedures at time of hysterectomy:  sounds like had cysto, ? IC--was put on medication in past    Past Ob History    TIUP: 1 live birth + 1 loss mid pregnancy; SIUP x 1   x 2.  C/s x 0.    Largest infant weight: 9#10z.  no FAVD. yes episiotomy.      Gynecologic History  LMP: No LMP recorded. Patient has had a hysterectomy.  Age of menarche: 8 yo  Age of menopause: with TLH  Menstrual history: h/o endometriosis  Pap test: post TLH.  History of abnormal paps: No.  History of STIs: yes-unknown-treated in 20s  Mammogram: overdue  Colonoscopy: Date of last: 2024.  Result:  polyp, suboptimal prep.  Repeat due:  .    DEXA:  none    Family History  Family History   Problem Relation Name Age of Onset    Cancer Mother          lung    Diabetes Father      Hypertension Father      Diabetes Sister      Thyroid disease Sister      No Known Problems Sister      No Known Problems Sister      Diabetes Brother      No Known Problems Daughter      No Known Problems Son      Anesthesia problems Neg Hx        Colon CA: No  Breast CA: No  GYN CA: No   CA: No    Social History  Social History     Tobacco Use   Smoking Status Former    Current packs/day: 0.00    Types: Cigarettes    Quit date:     Years since quittin.0    Passive exposure: Never   Smokeless Tobacco Never     Social History     Substance and Sexual Activity   Alcohol Use Not Currently   .    Social History     Substance and Sexual Activity   Drug Use Never     The patient is .  Resides in [place] [address].  Employment status:  just lost job; was an  .       Allergies  Review of patient's allergies indicates:   Allergen Reactions    Sulfamethoxazole-trimethoprim        Medications  Current Outpatient Medications on File Prior to Visit   Medication Sig Dispense Refill    acetaminophen (TYLENOL 8 HOUR ORAL) Take by mouth as needed. (Patient not taking: Reported on 9/19/2024)      albuterol (ACCUNEB) 1.25 mg/3 mL Nebu Take 1.25 mg by nebulization every 6 (six) hours as needed. Rescue      albuterol (PROVENTIL) 5 mg/mL nebulizer solution Take 2.5 mg by nebulization every 6 (six) hours as needed for Wheezing. Rescue      b complex vitamins tablet Take 1 tablet by mouth once daily. Vitamin B 12 1200 mcg sublingual   No B1      buPROPion (WELLBUTRIN XL) 150 MG TB24 tablet Take 150 mg by mouth once daily.      CALCIUM CITRATE ORAL Take 1 tablet by mouth 3 (three) times daily. (Patient not taking: Reported on 9/19/2024)      CAPLYTA 42 mg Cap Take 1 capsule by mouth.      estradioL (ESTRACE) 0.01 % (0.1 mg/gram) vaginal cream Place 1 g vaginally twice a week. 42.5 g 11    ferrous gluconate 324 mg (37.5 mg iron) Tab tablet TAKE 1 TABLET BY MOUTH DAILY WITH BREAKFAST 90 tablet 1    ibuprofen (ADVIL,MOTRIN) 200 MG tablet Take 200 mg by mouth every 6 (six) hours as needed for Pain. (Patient not taking: Reported on 9/19/2024)      montelukast (SINGULAIR) 10 mg tablet       multivitamin (THERAGRAN) per tablet Take 1 tablet by mouth 2 (two) times daily.      sertraline (ZOLOFT) 50 MG tablet Take 1 tablet by mouth once daily.      thiamine (VITAMIN B-1) 50 MG tablet Take 50 mg by mouth once daily.      traZODone (DESYREL) 50 MG tablet Take 50 mg by mouth every evening.      UNABLE TO FIND once daily. [place] Iron (Patient not taking: Reported on 9/19/2024)      VYVANSE 70 mg capsule Take 70 mg by mouth.       No current facility-administered medications on file prior to visit.       Review of Systems per HPI    Urogynecologic Exam  There were no vitals taken for this  visit.  Remainder of PE deferred as was VV audio.     Impression    1. Well woman exam    2. Vaginal atrophy    3. Cystocele with rectocele    4. Chronic idiopathic constipation    5. Surgical menopause    6. Mixed stress and urge urinary incontinence    7. Myalgia of pelvic floor    8. Dyspareunia, female    9. Type 2 diabetes mellitus without complication, without long-term current use of insulin        Initial Plan  The patient was counseled regarding these issues. The patient was given a summary sheet containing each of these issues with possible options for evaluation and management. When appropriate, we also reviewed computer-generated diagrams specific to their diagnoses..  All questions were addressed to the patient's satisfaction.    1)  Well-woman:  Pap test: post TLH.  History of abnormal paps: No.  History of STIs: yes-unknown-treated in 20s  Mammogram: overdue--ordered, please schedule  Colonoscopy: Date of last: 6/2024.  Result:  polyp, suboptimal prep.  Repeat due:  2025.    DEXA:  none--ordered, please schedule; h/o tob use/early surgical menopause/gastric bypass    2)  Chronic constipation:  --MR goodson 4/2024: grade 1-2 cystocele/rectocele   --exam today: very mild, distal pocketing on exam   --doesn't seem clinically significant   --rectocele does not cause constipation  --anal manometry 5/2024: Findings consistent with Type II dyssynergic  defecation  --continue to take fiber daily: increase to up to 6 T/day--figure out dose that keeps you from being too constipated or too soft/needing to assist  --take magnesium oxide 400 mg EVERY day  --continue hydration  --get some light, daily exercise  --continue pelvic floor PT exercises at home, baron to work on relaxing pelvic floor muscles and with bowel movements    3)  Pain with intercourse (biggest concern today):  --mostly triggered by levator muscles on exam  --resolved s/p PT  --continue PT exercises at home  --can try Vaginal suppositories  lidocaine  2%, valium 5 mg, baclofen 4% suppository  Disp: #20  Sig:  Apply 1 suppository vaginally 30 minutes before and/or after PT sessions or BID as needed for pain.   Refills: 5  [facility] (they deliver)  [address], [place] [address] [phone]    --treat Vaginal atrophy (dryness):    --continue Vaginal estrogen forever:  --Use 0.5 grams of estrogen cream in vagina with applicator or dime-sized amount with finger (as far as can reach internally)at night twice a week.  You can also apply a dime-sized amount with your finger around the vaginal opening and inner lips at same frequency.     --Vaginal estrogen may help to decrease pain related to dryness with intercourse and urinary symptoms (urgency/frequency/UTIs) around menopause.      --Non-estrogen options for vaginal dryness (can use during intercourse):  --coconut oil: dime-sized amount with finger (as far as can reach internally) and around the vaginal opening and inner lips up to nightly as needed  --Uberlube, [place], [place] liquibeads, Satin by Sliquid Natural Intimate Moisturizer    4)  Mixed urinary incontinence, urge > stress:    --Empty bladder every 3 hours.  Empty well: wait a minute, lean forward on toilet.    --Avoid dietary irritants (see sheet).  Keep diary x 3-5 days to determine your irritants.  --continue PT exercises at home  --URGE: consider medication in future. Takes 2-4 weeks to see if will have effect.  For dry mouth: get sour, sugar free lozenge or gum.    --STRESS:  Pessary vs. Sling vs injections.     5)  1st AM leakage:  --stop fluids 2 hours before bed.    --try not keep water by bed  --If have leg swelling:  Elevate feet above chest x 1 hour before bed to get excess fluid off.  Can also use support hose (knee highs).      6)  Follow up with local GYN for annual exams:  --call to make appt  [facility]  [facility]  [address]  [place] [address] [phone]     7)   RTC 1 year.  Someone will call you closer to that time to schedule.     Approximately 20 min were spent in consult, 100% in discussion.   Thank you for requesting consultation of your patient.  I look forward to participating in their care.    Justin Savage  Female Pelvic Medicine and Reconstructive Surgery  [facility] [place], [place]

## 2025-02-10 ENCOUNTER — PATIENT MESSAGE (OUTPATIENT)
Dept: BARIATRICS | Facility: CLINIC | Age: 50
End: 2025-02-10
Payer: COMMERCIAL

## 2025-03-10 ENCOUNTER — PATIENT MESSAGE (OUTPATIENT)
Dept: BARIATRICS | Facility: CLINIC | Age: 50
End: 2025-03-10
Payer: COMMERCIAL

## 2025-03-12 ENCOUNTER — DOCUMENTATION ONLY (OUTPATIENT)
Dept: REHABILITATION | Facility: HOSPITAL | Age: 50
End: 2025-03-12
Payer: COMMERCIAL

## 2025-03-12 NOTE — PROGRESS NOTES
Pelvic Health Physical Therapy   Discharge Summary     Name: Leora Steward  [facility] Number: [id number]     Therapy Diagnosis:        Encounter Diagnoses   Name Primary?    Mixed stress and urge urinary incontinence Yes    Muscle spasm      Coordination abnormal      Dyspareunia, female      Myalgia of pelvic floor        Physician: Justin Savage MD     Visit Date: 1/2/2025     Physician Orders: PT Eval and Treat PF PT  Medical Diagnosis from Referral: N39.46 (ICD-10-CM) - Mixed stress and urge urinary incontinence M79.18 (ICD-10-CM) - Myalgia of pelvic floor N94.10 (ICD-10-CM) - Dyspareunia, female  Evaluation Date: 9/25/2024  Authorization Period Expiration: 12/31/2024  Plan of Care Expiration: 12/18/2024  Progress Note Due: 1/2/2025  Visit # 10/12 Visits authorized: 1/20  FOTO: 3/3     Cancelled Visits: 0  No Show Visits: 0       Assessment     Patient did not complete plan of care.     Short Term Goals: 6 weeks   Patient will report improve leakage of urine. Goal met 10/31/2024 - worse with recent coughing  Patient will be independent with pelvic floor relaxation to improve bowel and bladder evacuation. Goal met - 01/02/2025   Patient will be able to demonstrate improved pelvic floor relaxation and contraction on rehabilitative ultrasound. Goal not met - progressing  Patient will report pelvic pain of less than 5/10 with vaginal penetration. Goal met - 12/11/2024   Patient will report improved water intake and decrease caffeine intake. Goal met 10/31/2024      Long Term Goals: 12 weeks   Patient will report urinating every 3-4 hours with strong urine stream. Goal met - 01/02/2025   Patient will deny fecal incontinence. Goal met 10/31/2024   Patient will report bowel evacuation every 1-2 days. Goal met - 01/02/2025 - moves every 2 days  Patient will deny pelvic pain with vaginal penetration. Goal met 10/31/2024   Patient will be compliant with vaginal dilator vs. Pelvic wand use. Goal met - 01/02/2025  Patient  will demonstrate adequate pelvic floor coordination with contraction and relaxation on sEMG vs rehabilitative ultrasound.  Goal not met - progressing     Plan     Discharge physical therapy at this time.     Toy Kumari, PT

## 2025-04-08 ENCOUNTER — PATIENT MESSAGE (OUTPATIENT)
Dept: BARIATRICS | Facility: CLINIC | Age: 50
End: 2025-04-08
Payer: COMMERCIAL

## 2025-04-14 ENCOUNTER — PATIENT MESSAGE (OUTPATIENT)
Dept: BARIATRICS | Facility: CLINIC | Age: 50
End: 2025-04-14
Payer: COMMERCIAL

## 2025-05-13 ENCOUNTER — PATIENT MESSAGE (OUTPATIENT)
Dept: BARIATRICS | Facility: CLINIC | Age: 50
End: 2025-05-13
Payer: COMMERCIAL

## 2025-05-17 DIAGNOSIS — D50.9 IRON DEFICIENCY ANEMIA, UNSPECIFIED IRON DEFICIENCY ANEMIA TYPE: ICD-10-CM

## 2025-05-18 RX ORDER — FERROUS GLUCONATE 324(37.5)
324 TABLET ORAL
Qty: 90 TABLET | Refills: 1 | Status: SHIPPED | OUTPATIENT
Start: 2025-05-18

## 2025-06-03 ENCOUNTER — PATIENT MESSAGE (OUTPATIENT)
Dept: BARIATRICS | Facility: CLINIC | Age: 50
End: 2025-06-03
Payer: COMMERCIAL

## 2025-07-07 ENCOUNTER — PATIENT MESSAGE (OUTPATIENT)
Dept: BARIATRICS | Facility: CLINIC | Age: 50
End: 2025-07-07
Payer: COMMERCIAL

## 2025-07-08 ENCOUNTER — PATIENT MESSAGE (OUTPATIENT)
Dept: BARIATRICS | Facility: CLINIC | Age: 50
End: 2025-07-08
Payer: COMMERCIAL

## 2025-08-12 ENCOUNTER — PATIENT MESSAGE (OUTPATIENT)
Dept: BARIATRICS | Facility: CLINIC | Age: 50
End: 2025-08-12
Payer: COMMERCIAL

## (undated) DEVICE — BLADE SURG CARBON STEEL #10

## (undated) DEVICE — ENDOSTITCH POLYSORB 2-0 ES-9

## (undated) DEVICE — LUBRICANT SURGILUBE 2 OZ

## (undated) DEVICE — TRAY MINOR GEN SURG OMC

## (undated) DEVICE — PAD ABDOMINAL STERILE 8X10IN

## (undated) DEVICE — GOWN SURGICAL X-LARGE

## (undated) DEVICE — SUT MONOCRYL 3-0 PS-2 UND

## (undated) DEVICE — TUBING HF INSUFFLATION W/ FLTR

## (undated) DEVICE — STAPLER ECHELON FLEX 60MM 44CM

## (undated) DEVICE — SHEARS HARMONIC 5CM 36CM

## (undated) DEVICE — PACK UNIVERSAL SPLIT II

## (undated) DEVICE — MANIFOLD 4 PORT

## (undated) DEVICE — SEE MEDLINE ITEM 157117

## (undated) DEVICE — SOL NS 1000CC

## (undated) DEVICE — BOVIE SUCTION

## (undated) DEVICE — ELECTRODE REM PLYHSV RETURN 9

## (undated) DEVICE — Device

## (undated) DEVICE — EVACUATOR WOUND BULB 100CC

## (undated) DEVICE — REMOVER STAPLE SKIN STERILE

## (undated) DEVICE — SCISSOR 5MMX35CM DIRECT DRIVE

## (undated) DEVICE — SEE MEDLINE ITEM 152622

## (undated) DEVICE — SPONGE LAP 18X18 PREWASHED

## (undated) DEVICE — ADHESIVE MASTISOL VIAL 48/BX

## (undated) DEVICE — SEE MEDLINE ITEM 152487

## (undated) DEVICE — NDL HYPO REG 25G X 1 1/2

## (undated) DEVICE — CANNULA ENDOPATH XCEL 5X100MM

## (undated) DEVICE — TIP YANKAUERS BULB NO VENT

## (undated) DEVICE — STAPLER SKIN ROTATING HEAD

## (undated) DEVICE — SYR 10CC LUER LOCK

## (undated) DEVICE — ADHESIVE DERMABOND ADVANCED

## (undated) DEVICE — SUT SILK 2-0 PS 18IN BLACK

## (undated) DEVICE — SUT MCRYL PLUS 4-0 PS2 27IN

## (undated) DEVICE — RELOAD ECHELON FLEX BLU 60MM

## (undated) DEVICE — SEE MEDLINE ITEM 152742

## (undated) DEVICE — CLOSURE SKIN STERI STRIP 1/2X4

## (undated) DEVICE — APPLIER CLIP LIAGCLIP 9.375IN

## (undated) DEVICE — GOWN AERO CHROME W/ TOWEL XL

## (undated) DEVICE — TRAY CATH FOL SIL URIMTR 16FR

## (undated) DEVICE — SKINMARKER & RULER REGULAR X-F

## (undated) DEVICE — RELOAD ECHELON FLEX WHT 60MM

## (undated) DEVICE — PAD ABD 8X10 STERILE

## (undated) DEVICE — DRAPE HALF SURGICAL 40X58IN

## (undated) DEVICE — DRAIN CHANNEL ROUND 15FR

## (undated) DEVICE — TRAY MINOR GEN SURG

## (undated) DEVICE — DRESSING XEROFORM FOIL PK 1X8

## (undated) DEVICE — TROCAR ENDOPATH XCEL 12MM 10CM

## (undated) DEVICE — IRRIGATOR ENDOSCOPY DISP.

## (undated) DEVICE — ENDOSTITCH INSTRUMENT

## (undated) DEVICE — SUT GUT PL. 4-0 27 FS-2

## (undated) DEVICE — BINDER ABD 12IN SM/MED 30-45IN

## (undated) DEVICE — SUT 0 VICRYL / UR6 (J603)

## (undated) DEVICE — TROCAR ENDOPATH XCEL 5X100MM

## (undated) DEVICE — TROCAR ENDOPATH XCEL 12X100MM

## (undated) DEVICE — SYS PRINEO SKIN CLOSURE

## (undated) DEVICE — BLADE SURG CARBON STEEL SZ11

## (undated) DEVICE — APPLICATOR CHLORAPREP ORN 26ML

## (undated) DEVICE — WARMER DRAPE STERILE LF

## (undated) DEVICE — DRAIN PENROSE XRAY 12 X 1/4 ST

## (undated) DEVICE — SUT 2-0 VICRYL / CT-1